# Patient Record
Sex: FEMALE | Race: WHITE | Employment: FULL TIME | URBAN - METROPOLITAN AREA
[De-identification: names, ages, dates, MRNs, and addresses within clinical notes are randomized per-mention and may not be internally consistent; named-entity substitution may affect disease eponyms.]

---

## 2017-02-21 ENCOUNTER — OFFICE VISIT (OUTPATIENT)
Dept: PRIMARY CARE CLINIC | Age: 37
End: 2017-02-21

## 2017-02-21 VITALS
RESPIRATION RATE: 16 BRPM | WEIGHT: 174.4 LBS | HEIGHT: 67 IN | OXYGEN SATURATION: 98 % | TEMPERATURE: 98.1 F | HEART RATE: 73 BPM | DIASTOLIC BLOOD PRESSURE: 73 MMHG | SYSTOLIC BLOOD PRESSURE: 147 MMHG | BODY MASS INDEX: 27.37 KG/M2

## 2017-02-21 DIAGNOSIS — M54.42 ACUTE LEFT-SIDED LOW BACK PAIN WITH LEFT-SIDED SCIATICA: Primary | ICD-10-CM

## 2017-02-21 RX ORDER — IBUPROFEN 800 MG/1
800 TABLET ORAL
Qty: 30 TAB | Refills: 0 | Status: SHIPPED | OUTPATIENT
Start: 2017-02-21 | End: 2017-05-18

## 2017-02-21 RX ORDER — CYCLOBENZAPRINE HCL 10 MG
10 TABLET ORAL
Qty: 30 TAB | Refills: 0 | Status: SHIPPED | OUTPATIENT
Start: 2017-02-21 | End: 2017-02-23 | Stop reason: SDUPTHER

## 2017-02-21 NOTE — MR AVS SNAPSHOT
Visit Information Date & Time Provider Department Dept. Phone Encounter #  
 2/21/2017 11:30 AM Jaycee Prasad NP [address] [facility] 9982 [phone] [id number] Follow-up Instructions Return if symptoms worsen or fail to improve. Upcoming Health Maintenance Date Due Pneumococcal 19-64 Medium Risk (1 of 1 - PPSV23) 10/17/1999 DTaP/Tdap/Td series (1 - Tdap) 10/17/2001 INFLUENZA AGE 9 TO ADULT 8/1/2016 PAP AKA CERVICAL CYTOLOGY 5/1/2018 Allergies as of 2/21/2017  Review Complete On: 2/21/2017 By: Jaycee Prasad NP Severity Noted Reaction Type Reactions Beef Containing Products  10/24/2016    Other (comments) Gi distress Codeine  05/18/2010    Hives Current Immunizations  Reviewed on 10/24/2016 No immunizations on file. Not reviewed this visit You Were Diagnosed With   
  
 Codes Comments Acute left-sided low back pain with left-sided sciatica    -  Primary ICD-10-CM: M54.42 
ICD-9-CM: 724.2, 724.3 Vitals BP Pulse Temp Resp Height(growth percentile) Weight(growth percentile) 147/73 73 98.1 °F (36.7 °C) (Oral) 16 5' 7\" (1.702 m) 174 lb 6.4 oz (79.1 kg) LMP SpO2 BMI OB Status Smoking Status 02/12/2017 (Exact Date) 98% 27.31 kg/m2 Having regular periods Current Every Day Smoker Vitals History BMI and BSA Data Body Mass Index Body Surface Area  
 27.31 kg/m 2 1.93 m 2 Preferred Pharmacy Pharmacy Name Phone [facility], [address] [place] [address] AT [address] [phone] Your Updated Medication List  
  
   
This list is accurate as of: 2/21/17 12:14 PM.  Always use your most recent med list.  
  
  
  
  
 albuterol-ipratropium 2.5 mg-0.5 mg/3 ml Nebu Commonly known as:  DUO-NEB  
3 mL by Nebulization route every four (4) hours as needed. cyclobenzaprine 10 mg tablet Commonly known as:  FLEXERIL  
 Take 1 Tab by mouth three (3) times daily as needed for Muscle Spasm(s). fluticasone 50 mcg/actuation nasal spray Commonly known as:  Nan Finely 2 Sprays by Both Nostrils route daily. ibuprofen 800 mg tablet Commonly known as:  MOTRIN Take 1 Tab by mouth every eight (8) hours as needed for Pain. Prescriptions Sent to Pharmacy Refills  
 ibuprofen (MOTRIN) 800 mg tablet 0 Sig: Take 1 Tab by mouth every eight (8) hours as needed for Pain. Class: Normal  
 Pharmacy: [facility] [address] AT [address] Ph #: [phone] Route: Oral  
 cyclobenzaprine (FLEXERIL) 10 mg tablet 0 Sig: Take 1 Tab by mouth three (3) times daily as needed for Muscle Spasm(s). Class: Normal  
 Pharmacy: [facility] [address] Ph #: [phone] Route: Oral  
  
Follow-up Instructions Return if symptoms worsen or fail to improve. Patient Instructions Back Pain: Care Instructions Your Care Instructions Back pain has many possible causes. It is often related to problems with muscles and ligaments of the back. It may also be related to problems with the nerves, discs, or bones of the back. Moving, lifting, standing, sitting, or sleeping in an awkward way can strain the back. Sometimes you don't notice the injury until later. Arthritis is another common cause of back pain. Although it may hurt a lot, back pain usually improves on its own within several weeks. Most people recover in 12 weeks or less. Using good home treatment and being careful not to stress your back can help you feel better sooner. Follow-up care is a key part of your treatment and safety. Be sure to make and go to all appointments, and call your doctor if you are having problems.  Its also a good idea to know your test results and keep a list of the medicines you take. How can you care for yourself at home? · Sit or lie in positions that are most comfortable and reduce your pain. Try one of these positions when you lie down: ¨ Lie on your back with your knees bent and supported by large pillows. ¨ Lie on the floor with your legs on the seat of a sofa or chair. Toña Miller on your side with your knees and hips bent and a pillow between your legs. ¨ Lie on your stomach if it does not make pain worse. · Do not sit up in bed, and avoid soft couches and twisted positions. Bed rest can help relieve pain at first, but it delays healing. Avoid bed rest after the first day of back pain. · Change positions every 30 minutes. If you must sit for long periods of time, take breaks from sitting. Get up and walk around, or lie in a comfortable position. · Try using a heating pad on a low or medium setting for 15 to 20 minutes every 2 or 3 hours. Try a warm shower in place of one session with the heating pad. · You can also try an ice pack for 10 to 15 minutes every 2 to 3 hours. Put a thin cloth between the ice pack and your skin. · Take pain medicines exactly as directed. ¨ If the doctor gave you a prescription medicine for pain, take it as prescribed. ¨ If you are not taking a prescription pain medicine, ask your doctor if you can take an over-the-counter medicine. · Take short walks several times a day. You can start with 5 to 10 minutes, 3 or 4 times a day, and work up to longer walks. Walk on level surfaces and avoid hills and stairs until your back is better. · Return to work and other activities as soon as you can. Continued rest without activity is usually not good for your back. · To prevent future back pain, do exercises to stretch and strengthen your back and stomach. Learn how to use good posture, safe lifting techniques, and proper body mechanics. When should you call for help? Call your doctor now or seek immediate medical care if: · You have new or worsening numbness in your legs. · You have new or worsening weakness in your legs. (This could make it hard to stand up.) · You lose control of your bladder or bowels. Watch closely for changes in your health, and be sure to contact your doctor if: 
· Your pain gets worse. · You are not getting better after 2 weeks. Where can you learn more? Go to http://shawn-lachelle.info/. Enter Q885 in the search box to learn more about \"Back Pain: Care Instructions. \" Current as of: May 23, 2016 Content Version: 11.1 © 5923-8050 tuul. Care instructions adapted under license by [employer] (which disclaims liability or warranty for this information). If you have questions about a medical condition or this instruction, always ask your healthcare professional. [address] any warranty or liability for your use of this information. Back Stretches: Exercises Your Care Instructions Here are some examples of exercises for stretching your back. Start each exercise slowly. Ease off the exercise if you start to have pain. Your doctor or physical therapist will tell you when you can start these exercises and which ones will work best for you. How to do the exercises Overhead stretch 1. Stand comfortably with your feet shoulder-width apart. 2. Looking straight ahead, raise both arms over your head and reach toward the ceiling. Do not allow your head to tilt back. 3. Hold for 15 to 30 seconds, then lower your arms to your sides. 4. Repeat 2 to 4 times. Side stretch 1. Stand comfortably with your feet shoulder-width apart. 2. Raise one arm over your head, and then lean to the other side. 3. Slide your hand down your leg as you let the weight of your arm gently stretch your side muscles. Hold for 15 to 30 seconds. 4. Repeat 2 to 4 times on each side. Press-up 1. Lie on your stomach, supporting your body with your forearms. 2. Press your elbows down into the floor to raise your upper back. As you do this, relax your stomach muscles and allow your back to arch without using your back muscles. As your press up, do not let your hips or pelvis come off the floor. 3. Hold for 15 to 30 seconds, then relax. 4. Repeat 2 to 4 times. Relax and rest 
 
1. Lie on your back with a rolled towel under your neck and a pillow under your knees. Extend your arms comfortably to your sides. 2. Relax and breathe normally. 3. Remain in this position for about 10 minutes. 4. If you can, do this 2 or 3 times each day. Follow-up care is a key part of your treatment and safety. Be sure to make and go to all appointments, and call your doctor if you are having problems. It's also a good idea to know your test results and keep a list of the medicines you take. Where can you learn more? Go to http://shawn-lachelle.info/. Enter R937 in the search box to learn more about \"Back Stretches: Exercises. \" Current as of: May 23, 2016 Content Version: 11.1 © 8090-6468 [employer]. Care instructions adapted under license by [employer] (which disclaims liability or warranty for this information). If you have questions about a medical condition or this instruction, always ask your healthcare professional. [place] [address] any warranty or liability for your use of this information. Introducing [facility]! Brenda Reyes introduces [employer] patient portal. Now you can access parts of your medical record, email your doctor's office, and request medication refills online. 1. In your internet browser, go to [email or web address] 2. Click on the First Time User? Click Here link in the Sign In box. You will see the New Member Sign Up page. 3. Enter your [employer] Access Code exactly as it appears below.  You will not need to use this code after youve completed the sign-up process. If you do not sign up before the expiration date, you must request a new code. · [employer] Access Code: [email or web address] Expires: 5/22/2017 12:08 PM 
 
4. Enter the last four digits of your Social Security Number (xxxx) and Date of Birth (mm/dd/yyyy) as indicated and click Submit. You will be taken to the next sign-up page. 5. Create a [employer] ID. This will be your [employer] login ID and cannot be changed, so think of one that is secure and easy to remember. 6. Create a [employer] password. You can change your password at any time. 7. Enter your Password Reset Question and Answer. This can be used at a later time if you forget your password. 8. Enter your e-mail address. You will receive e-mail notification when new information is available in [address]. 9. Click Sign Up. You can now view and download portions of your medical record. 10. Click the Download Summary menu link to download a portable copy of your medical information. If you have questions, please visit the Frequently Asked Questions section of the [employer] website. Remember, [employer] is NOT to be used for urgent needs. For medical emergencies, dial 911. Now available from your iPhone and Android! Please provide this summary of care documentation to your next provider. Your primary care clinician is listed as Tyler Valerio. If you have any questions after today's visit, please call [phone].

## 2017-02-21 NOTE — PATIENT INSTRUCTIONS
Back Pain: Care Instructions  Your Care Instructions    Back pain has many possible causes. It is often related to problems with muscles and ligaments of the back. It may also be related to problems with the nerves, discs, or bones of the back. Moving, lifting, standing, sitting, or sleeping in an awkward way can strain the back. Sometimes you don't notice the injury until later. Arthritis is another common cause of back pain. Although it may hurt a lot, back pain usually improves on its own within several weeks. Most people recover in 12 weeks or less. Using good home treatment and being careful not to stress your back can help you feel better sooner. Follow-up care is a key part of your treatment and safety. Be sure to make and go to all appointments, and call your doctor if you are having problems. Its also a good idea to know your test results and keep a list of the medicines you take. How can you care for yourself at home? · Sit or lie in positions that are most comfortable and reduce your pain. Try one of these positions when you lie down:  ¨ Lie on your back with your knees bent and supported by large pillows. ¨ Lie on the floor with your legs on the seat of a sofa or chair. Tildon Floss on your side with your knees and hips bent and a pillow between your legs. ¨ Lie on your stomach if it does not make pain worse. · Do not sit up in bed, and avoid soft couches and twisted positions. Bed rest can help relieve pain at first, but it delays healing. Avoid bed rest after the first day of back pain. · Change positions every 30 minutes. If you must sit for long periods of time, take breaks from sitting. Get up and walk around, or lie in a comfortable position. · Try using a heating pad on a low or medium setting for 15 to 20 minutes every 2 or 3 hours. Try a warm shower in place of one session with the heating pad. · You can also try an ice pack for 10 to 15 minutes every 2 to 3 hours.  Put a thin cloth between the ice pack and your skin. · Take pain medicines exactly as directed. ¨ If the doctor gave you a prescription medicine for pain, take it as prescribed. ¨ If you are not taking a prescription pain medicine, ask your doctor if you can take an over-the-counter medicine. · Take short walks several times a day. You can start with 5 to 10 minutes, 3 or 4 times a day, and work up to longer walks. Walk on level surfaces and avoid hills and stairs until your back is better. · Return to work and other activities as soon as you can. Continued rest without activity is usually not good for your back. · To prevent future back pain, do exercises to stretch and strengthen your back and stomach. Learn how to use good posture, safe lifting techniques, and proper body mechanics. When should you call for help? Call your doctor now or seek immediate medical care if:  · You have new or worsening numbness in your legs. · You have new or worsening weakness in your legs. (This could make it hard to stand up.)  · You lose control of your bladder or bowels. Watch closely for changes in your health, and be sure to contact your doctor if:  · Your pain gets worse. · You are not getting better after 2 weeks. Where can you learn more? Go to http://shawn-lachelle.info/. Enter U590 in the search box to learn more about \"Back Pain: Care Instructions. \"  Current as of: May 23, 2016  Content Version: 11.1  © 8171-8266 [employer]. Care instructions adapted under license by [employer] (which disclaims liability or warranty for this information). If you have questions about a medical condition or this instruction, always ask your healthcare professional. [address] any warranty or liability for your use of this information. Back Stretches: Exercises  Your Care Instructions  Here are some examples of exercises for stretching your back. Start each exercise slowly. Ease off the exercise if you start to have pain. Your doctor or physical therapist will tell you when you can start these exercises and which ones will work best for you. How to do the exercises  Overhead stretch    1. Stand comfortably with your feet shoulder-width apart. 2. Looking straight ahead, raise both arms over your head and reach toward the ceiling. Do not allow your head to tilt back. 3. Hold for 15 to 30 seconds, then lower your arms to your sides. 4. Repeat 2 to 4 times. Side stretch    1. Stand comfortably with your feet shoulder-width apart. 2. Raise one arm over your head, and then lean to the other side. 3. Slide your hand down your leg as you let the weight of your arm gently stretch your side muscles. Hold for 15 to 30 seconds. 4. Repeat 2 to 4 times on each side. Press-up    1. Lie on your stomach, supporting your body with your forearms. 2. Press your elbows down into the floor to raise your upper back. As you do this, relax your stomach muscles and allow your back to arch without using your back muscles. As your press up, do not let your hips or pelvis come off the floor. 3. Hold for 15 to 30 seconds, then relax. 4. Repeat 2 to 4 times. Relax and rest    1. Lie on your back with a rolled towel under your neck and a pillow under your knees. Extend your arms comfortably to your sides. 2. Relax and breathe normally. 3. Remain in this position for about 10 minutes. 4. If you can, do this 2 or 3 times each day. Follow-up care is a key part of your treatment and safety. Be sure to make and go to all appointments, and call your doctor if you are having problems. It's also a good idea to know your test results and keep a list of the medicines you take. Where can you learn more? Go to http://shawn-lachelle.info/. Enter K552 in the search box to learn more about \"Back Stretches: Exercises. \"  Current as of: May 23, 2016  Content Version: 11.1  © 8575-9118 [employer]wise, Incorporated. Care instructions adapted under license by [employer] (which disclaims liability or warranty for this information). If you have questions about a medical condition or this instruction, always ask your healthcare professional. [address] any warranty or liability for your use of this information.

## 2017-02-21 NOTE — PROGRESS NOTES
Subjective: Ilene Alfonso is a 39 y.o. female who complains of mostly left low back pain for 1 week, positional with bending or lifting, with radiation down the left leg. Precipitating factors: awkward seat position on new bus, no other specific event that she can recall. Prior history of back problems: recurrent self limited episodes of low back pain in the past. There is not numbness in the legs. She is accompanied by her  today. Symptoms are worst: all the time. Alleviating factors identifiable by patient are none. Exacerbating factors identifiable by patient are sitting, walking, bending forwards, changing positions. Taking Tylenol prn pain with little to no relief. Past Medical History   Diagnosis Date    Other ill-defined conditions(799.89)      ovarian cyst     Psychiatric disorder      suicide attempt, depression      Past Surgical History   Procedure Laterality Date    Hx appendectomy       1999    Hx gyn       BTL    Hx gyn       tubes tied 10/2008     Allergies   Allergen Reactions    Beef Containing Products Other (comments)     Gi distress     Codeine Hives       Review of Systems  Pertinent items are noted in HPI. Objective:     Visit Vitals    /73    Pulse 73    Temp 98.1 °F (36.7 °C) (Oral)    Resp 16    Ht 5' 7\" (1.702 m)    Wt 174 lb 6.4 oz (79.1 kg)    LMP 02/12/2017 (Exact Date)    SpO2 98%    BMI 27.31 kg/m2      Patient appears to be in mild to moderate pain, antalgic gait noted. Lumbosacral spine area reveals no local tenderness or mass. Painful and reduced LS ROM noted. Straight leg raise is negative at 75 degrees on bilateral. DTR's, motor strength and sensation normal, including heel and toe gait. Peripheral pulses are palpable. Assessment/Plan:       ICD-10-CM ICD-9-CM    1.  Acute left-sided low back pain with left-sided sciatica M54.42 724.2      724.3      Orders Placed This Encounter    ibuprofen (MOTRIN) 800 mg tablet     Sig: Take 1 Tab by mouth every eight (8) hours as needed for Pain. Dispense:  30 Tab     Refill:  0    cyclobenzaprine (FLEXERIL) 10 mg tablet     Sig: Take 1 Tab by mouth three (3) times daily as needed for Muscle Spasm(s). Dispense:  30 Tab     Refill:  0       Work note given. For acute pain, rest, intermittent application of heat (do not sleep on heating pad), analgesics (Ibuprofen 800mg q8h prn - she is codeine allergic) and muscle relaxants are recommended. Discussed longer term treatment plan of prn NSAID's and discussed a home back care exercise program with flexion exercise routine. Proper lifting with avoidance of heavy lifting discussed. Consider Physical Therapy and XRay studies if not improving. Call or return to clinic prn if these symptoms worsen or fail to improve as anticipated.       Randal Quevedo, NP

## 2017-02-21 NOTE — LETTER
NOTIFICATION RETURN TO WORK / SCHOOL 
 
2/21/2017 12:17 PM 
 
Ms. Fox Lin [address] [place] [facility] [address] To Whom It May Concern: Fox Lin is currently under the care of [facility]. She will return to work/school on 2/24/2017. If there are questions or concerns please have the patient contact our office. Sincerely, Sandra Swan NP

## 2017-02-23 ENCOUNTER — OFFICE VISIT (OUTPATIENT)
Dept: PRIMARY CARE CLINIC | Age: 37
End: 2017-02-23

## 2017-02-23 VITALS
DIASTOLIC BLOOD PRESSURE: 80 MMHG | TEMPERATURE: 98.4 F | HEIGHT: 67 IN | HEART RATE: 87 BPM | OXYGEN SATURATION: 98 % | BODY MASS INDEX: 27.47 KG/M2 | RESPIRATION RATE: 16 BRPM | SYSTOLIC BLOOD PRESSURE: 125 MMHG | WEIGHT: 175 LBS

## 2017-02-23 DIAGNOSIS — M54.16 LUMBAR RADICULOPATHY: Primary | ICD-10-CM

## 2017-02-23 RX ORDER — CYCLOBENZAPRINE HCL 10 MG
10 TABLET ORAL
Qty: 30 TAB | Refills: 0 | Status: SHIPPED | OUTPATIENT
Start: 2017-02-23 | End: 2017-05-18

## 2017-02-23 RX ORDER — METHYLPREDNISOLONE 4 MG/1
4 TABLET ORAL
Qty: 1 DOSE PACK | Refills: 0 | Status: SHIPPED | OUTPATIENT
Start: 2017-02-23 | End: 2017-03-01

## 2017-02-23 NOTE — LETTER
NOTIFICATION RETURN TO WORK / SCHOOL 
 
2/23/2017 3:35 PM 
 
Ms. Brian Elliott [address] [place] [address] To Whom It May Concern: Brian Elliott is currently under the care of [facility]. She will return to work/school on: 2-27-17 If there are questions or concerns please have the patient contact our office.  
 
 
 
Sincerely, 
 
 
Kenzie Wisdom NP

## 2017-02-23 NOTE — MR AVS SNAPSHOT
Visit Information Date & Time Provider Department Dept. Phone Encounter #  
 2/23/2017  3:00 PM Judith Connell NP [address] [address] [phone] [id number] Follow-up Instructions Return if symptoms worsen or fail to improve. Upcoming Health Maintenance Date Due Pneumococcal 19-64 Medium Risk (1 of 1 - PPSV23) 10/17/1999 DTaP/Tdap/Td series (1 - Tdap) 10/17/2001 INFLUENZA AGE 9 TO ADULT 8/1/2016 PAP AKA CERVICAL CYTOLOGY 5/1/2018 Allergies as of 2/23/2017  Review Complete On: 2/23/2017 By: Judith Connell NP Severity Noted Reaction Type Reactions Beef Containing Products  10/24/2016    Other (comments) Gi distress Codeine  05/18/2010    Hives Current Immunizations  Reviewed on 10/24/2016 No immunizations on file. Not reviewed this visit You Were Diagnosed With   
  
 Codes Comments Lumbar radiculopathy    -  Primary ICD-10-CM: M54.16 
ICD-9-CM: 724.4 Vitals BP  
  
  
  
  
  
 125/80 Vitals History BMI and BSA Data Body Mass Index Body Surface Area  
 27.41 kg/m 2 1.94 m 2 Preferred Pharmacy Pharmacy Name Phone [address] [place] [address], [address] [place] [address] Darlin Arreaga Said [phone] Your Updated Medication List  
  
   
This list is accurate as of: 2/23/17  3:34 PM.  Always use your most recent med list.  
  
  
  
  
 cyclobenzaprine 10 mg tablet Commonly known as:  FLEXERIL Take 1 Tab by mouth three (3) times daily as needed for Muscle Spasm(s). ibuprofen 800 mg tablet Commonly known as:  MOTRIN Take 1 Tab by mouth every eight (8) hours as needed for Pain. methylPREDNISolone 4 mg tablet Commonly known as:  Evy Blakes Take 1 Tab by mouth Specific Days and Specific Times for 6 days. 4 mg tabs orally; 6 tabs today then 5 tab/day #2, 4 tab/day #3, 3 tab/day #4, 2 tab/day #5, 1 tab/day # 6 then discontinue. Prescriptions Printed Refills  
 methylPREDNISolone (MEDROL DOSEPACK) 4 mg tablet 0 Sig: Take 1 Tab by mouth Specific Days and Specific Times for 6 days. 4 mg tabs orally; 6 tabs today then 5 tab/day #2, 4 tab/day #3, 3 tab/day #4, 2 tab/day #5, 1 tab/day # 6 then discontinue. Class: Print Route: Oral  
  
Prescriptions Sent to Pharmacy Refills  
 cyclobenzaprine (FLEXERIL) 10 mg tablet 0 Sig: Take 1 Tab by mouth three (3) times daily as needed for Muscle Spasm(s). Class: Normal  
 Pharmacy: Kendra Calderon  at [address] #: [phone] Route: Oral  
  
We Performed the Following REFERRAL TO ORTHOPEDICS [JNQ186 Custom] Comments:  
 Please evaluate patient for Lumbar pain with radiculopathy REFERRAL TO PRIMARY CARE [KHW004 CPT(R)] Comments:  
 Evaluate for PCP Care Follow-up Instructions Return if symptoms worsen or fail to improve. Referral Information Referral ID Referred By Referred To  
  
 [id number] DONNA, [address] [place] [address], [address] [place] [address]. [address] Phone: [phone] Fax: [phone] Visits Status Start Date End Date 1 New Request 2/23/17 2/23/18 If your referral has a status of pending review or denied, additional information will be sent to support the outcome of this decision. Referral ID Referred By Referred To  
 [id number] Ryan Lui MD  
   [facility], [address] [place] [address] Phone: [phone] Fax: [phone] Visits Status Start Date End Date 1 New Request 2/23/17 2/23/18 If your referral has a status of pending review or denied, additional information will be sent to support the outcome of this decision. Patient Instructions Back Stretches: Exercises Your Care Instructions Here are some examples of exercises for stretching your back. Start each exercise slowly. Ease off the exercise if you start to have pain. Your doctor or physical therapist will tell you when you can start these exercises and which ones will work best for you. How to do the exercises Overhead stretch 1. Stand comfortably with your feet shoulder-width apart. 2. Looking straight ahead, raise both arms over your head and reach toward the ceiling. Do not allow your head to tilt back. 3. Hold for 15 to 30 seconds, then lower your arms to your sides. 4. Repeat 2 to 4 times. Side stretch 1. Stand comfortably with your feet shoulder-width apart. 2. Raise one arm over your head, and then lean to the other side. 3. Slide your hand down your leg as you let the weight of your arm gently stretch your side muscles. Hold for 15 to 30 seconds. 4. Repeat 2 to 4 times on each side. Press-up 1. Lie on your stomach, supporting your body with your forearms. 2. Press your elbows down into the floor to raise your upper back. As you do this, relax your stomach muscles and allow your back to arch without using your back muscles. As your press up, do not let your hips or pelvis come off the floor. 3. Hold for 15 to 30 seconds, then relax. 4. Repeat 2 to 4 times. Relax and rest 
 
1. Lie on your back with a rolled towel under your neck and a pillow under your knees. Extend your arms comfortably to your sides. 2. Relax and breathe normally. 3. Remain in this position for about 10 minutes. 4. If you can, do this 2 or 3 times each day. Follow-up care is a key part of your treatment and safety. Be sure to make and go to all appointments, and call your doctor if you are having problems. It's also a good idea to know your test results and keep a list of the medicines you take. Where can you learn more? Go to http://shawn-lachelle.info/. Enter U659 in the search box to learn more about \"Back Stretches: Exercises. \" Current as of: May 23, 2016 Content Version: 11.1 © 2006-2016 [employer]. Care instructions adapted under license by [employer] (which disclaims liability or warranty for this information). If you have questions about a medical condition or this instruction, always ask your healthcare professional. [address] any warranty or liability for your use of this information. Back Pain: Care Instructions Your Care Instructions Back pain has many possible causes. It is often related to problems with muscles and ligaments of the back. It may also be related to problems with the nerves, discs, or bones of the back. Moving, lifting, standing, sitting, or sleeping in an awkward way can strain the back. Sometimes you don't notice the injury until later. Arthritis is another common cause of back pain. Although it may hurt a lot, back pain usually improves on its own within several weeks. Most people recover in 12 weeks or less. Using good home treatment and being careful not to stress your back can help you feel better sooner. Follow-up care is a key part of your treatment and safety. Be sure to make and go to all appointments, and call your doctor if you are having problems. Its also a good idea to know your test results and keep a list of the medicines you take. How can you care for yourself at home? · Sit or lie in positions that are most comfortable and reduce your pain. Try one of these positions when you lie down: ¨ Lie on your back with your knees bent and supported by large pillows. ¨ Lie on the floor with your legs on the seat of a sofa or chair. Rachid Ports on your side with your knees and hips bent and a pillow between your legs. ¨ Lie on your stomach if it does not make pain worse. · Do not sit up in bed, and avoid soft couches and twisted positions.  Bed rest can help relieve pain at first, but it delays healing. Avoid bed rest after the first day of back pain. · Change positions every 30 minutes. If you must sit for long periods of time, take breaks from sitting. Get up and walk around, or lie in a comfortable position. · Try using a heating pad on a low or medium setting for 15 to 20 minutes every 2 or 3 hours. Try a warm shower in place of one session with the heating pad. · You can also try an ice pack for 10 to 15 minutes every 2 to 3 hours. Put a thin cloth between the ice pack and your skin. · Take pain medicines exactly as directed. ¨ If the doctor gave you a prescription medicine for pain, take it as prescribed. ¨ If you are not taking a prescription pain medicine, ask your doctor if you can take an over-the-counter medicine. · Take short walks several times a day. You can start with 5 to 10 minutes, 3 or 4 times a day, and work up to longer walks. Walk on level surfaces and avoid hills and stairs until your back is better. · Return to work and other activities as soon as you can. Continued rest without activity is usually not good for your back. · To prevent future back pain, do exercises to stretch and strengthen your back and stomach. Learn how to use good posture, safe lifting techniques, and proper body mechanics. When should you call for help? Call your doctor now or seek immediate medical care if: 
· You have new or worsening numbness in your legs. · You have new or worsening weakness in your legs. (This could make it hard to stand up.) · You lose control of your bladder or bowels. Watch closely for changes in your health, and be sure to contact your doctor if: 
· Your pain gets worse. · You are not getting better after 2 weeks. Where can you learn more? Go to http://shawn-lachelle.info/. Enter Q470 in the search box to learn more about \"Back Pain: Care Instructions. \" Current as of: May 23, 2016 Content Version: 11.1 © 7731-4255 [employer]. Care instructions adapted under license by [employer] (which disclaims liability or warranty for this information). If you have questions about a medical condition or this instruction, always ask your healthcare professional. [address] any warranty or liability for your use of this information. Introducing [facility]! Jorge Bishop introduces [employer] patient portal. Now you can access parts of your medical record, email your doctor's office, and request medication refills online. 1. In your internet browser, go to [email or web address] 2. Click on the First Time User? Click Here link in the Sign In box. You will see the New Member Sign Up page. 3. Enter your [employer] Access Code exactly as it appears below. You will not need to use this code after youve completed the sign-up process. If you do not sign up before the expiration date, you must request a new code. · [employer] Access Code: [email or web address] Expires: 5/22/2017 12:08 PM 
 
4. Enter the last four digits of your Social Security Number (xxxx) and Date of Birth (mm/dd/yyyy) as indicated and click Submit. You will be taken to the next sign-up page. 5. Create a [employer] ID. This will be your [employer] login ID and cannot be changed, so think of one that is secure and easy to remember. 6. Create a [employer] password. You can change your password at any time. 7. Enter your Password Reset Question and Answer. This can be used at a later time if you forget your password. 8. Enter your e-mail address. You will receive e-mail notification when new information is available in [address] 9. Click Sign Up. You can now view and download portions of your medical record. 10. Click the Download Summary menu link to download a portable copy of your medical information. If you have questions, please visit the Frequently Asked Questions section of the [employer] website. Remember, [employer] is NOT to be used for urgent needs. For medical emergencies, dial 911. Now available from your iPhone and Android! Please provide this summary of care documentation to your next provider. If you have any questions after today's visit, please call [phone].

## 2017-02-23 NOTE — PROGRESS NOTES
Chief Complaint   Patient presents with    Back Pain     pt c/o continued back pain that is now shooting down her L leg, was seen on 2/21/17, took 1 motrin 800 mg this morning

## 2017-02-23 NOTE — PATIENT INSTRUCTIONS
Back Stretches: Exercises  Your Care Instructions  Here are some examples of exercises for stretching your back. Start each exercise slowly. Ease off the exercise if you start to have pain. Your doctor or physical therapist will tell you when you can start these exercises and which ones will work best for you. How to do the exercises  Overhead stretch    1. Stand comfortably with your feet shoulder-width apart. 2. Looking straight ahead, raise both arms over your head and reach toward the ceiling. Do not allow your head to tilt back. 3. Hold for 15 to 30 seconds, then lower your arms to your sides. 4. Repeat 2 to 4 times. Side stretch    1. Stand comfortably with your feet shoulder-width apart. 2. Raise one arm over your head, and then lean to the other side. 3. Slide your hand down your leg as you let the weight of your arm gently stretch your side muscles. Hold for 15 to 30 seconds. 4. Repeat 2 to 4 times on each side. Press-up    1. Lie on your stomach, supporting your body with your forearms. 2. Press your elbows down into the floor to raise your upper back. As you do this, relax your stomach muscles and allow your back to arch without using your back muscles. As your press up, do not let your hips or pelvis come off the floor. 3. Hold for 15 to 30 seconds, then relax. 4. Repeat 2 to 4 times. Relax and rest    1. Lie on your back with a rolled towel under your neck and a pillow under your knees. Extend your arms comfortably to your sides. 2. Relax and breathe normally. 3. Remain in this position for about 10 minutes. 4. If you can, do this 2 or 3 times each day. Follow-up care is a key part of your treatment and safety. Be sure to make and go to all appointments, and call your doctor if you are having problems. It's also a good idea to know your test results and keep a list of the medicines you take. Where can you learn more? Go to http://shawn-lachelle.info/.   Enter M334 in the search box to learn more about \"Back Stretches: Exercises. \"  Current as of: May 23, 2016  Content Version: 11.1  © 2006-2016 [employer]. Care instructions adapted under license by "[employer]" (which disclaims liability or warranty for this information). If you have questions about a medical condition or this instruction, always ask your healthcare professional. [address]  any warranty or liability for your use of this information. Back Pain: Care Instructions  Your Care Instructions    Back pain has many possible causes. It is often related to problems with muscles and ligaments of the back. It may also be related to problems with the nerves, discs, or bones of the back. Moving, lifting, standing, sitting, or sleeping in an awkward way can strain the back. Sometimes you don't notice the injury until later. Arthritis is another common cause of back pain. Although it may hurt a lot, back pain usually improves on its own within several weeks. Most people recover in 12 weeks or less. Using good home treatment and being careful not to stress your back can help you feel better sooner. Follow-up care is a key part of your treatment and safety. Be sure to make and go to all appointments, and call your doctor if you are having problems. Its also a good idea to know your test results and keep a list of the medicines you take. How can you care for yourself at home? · Sit or lie in positions that are most comfortable and reduce your pain. Try one of these positions when you lie down:  ¨ Lie on your back with your knees bent and supported by large pillows. ¨ Lie on the floor with your legs on the seat of a sofa or chair. Toña Miller on your side with your knees and hips bent and a pillow between your legs. ¨ Lie on your stomach if it does not make pain worse. · Do not sit up in bed, and avoid soft couches and twisted positions.  Bed rest can help relieve pain at first, but it delays healing. Avoid bed rest after the first day of back pain. · Change positions every 30 minutes. If you must sit for long periods of time, take breaks from sitting. Get up and walk around, or lie in a comfortable position. · Try using a heating pad on a low or medium setting for 15 to 20 minutes every 2 or 3 hours. Try a warm shower in place of one session with the heating pad. · You can also try an ice pack for 10 to 15 minutes every 2 to 3 hours. Put a thin cloth between the ice pack and your skin. · Take pain medicines exactly as directed. ¨ If the doctor gave you a prescription medicine for pain, take it as prescribed. ¨ If you are not taking a prescription pain medicine, ask your doctor if you can take an over-the-counter medicine. · Take short walks several times a day. You can start with 5 to 10 minutes, 3 or 4 times a day, and work up to longer walks. Walk on level surfaces and avoid hills and stairs until your back is better. · Return to work and other activities as soon as you can. Continued rest without activity is usually not good for your back. · To prevent future back pain, do exercises to stretch and strengthen your back and stomach. Learn how to use good posture, safe lifting techniques, and proper body mechanics. When should you call for help? Call your doctor now or seek immediate medical care if:  · You have new or worsening numbness in your legs. · You have new or worsening weakness in your legs. (This could make it hard to stand up.)  · You lose control of your bladder or bowels. Watch closely for changes in your health, and be sure to contact your doctor if:  · Your pain gets worse. · You are not getting better after 2 weeks. Where can you learn more? Go to http://shawn-lachelle.info/. Enter T971 in the search box to learn more about \"Back Pain: Care Instructions. \"  Current as of: May 23, 2016  Content Version: 11.1  © 6267-0932 [employer]. Care instructions adapted under license by [employer] (which disclaims liability or warranty for this information). If you have questions about a medical condition or this instruction, always ask your healthcare professional. [address] any warranty or liability for your use of this information.

## 2017-02-26 NOTE — PROGRESS NOTES
Subjective: Mathew Bo is a 39 y.o. female who complains of low back pain for 3 weeks, positional with bending or lifting, with radiation down the legs. Precipitating factors: none recalled by the patient. Prior history of back problems: recurrent self limited episodes of low back pain in the past. There is no numbness in the legs. She has occasional shooting pain down her legs, no numbness, weakness or tingling. The pain is relieved with rest.  Symptoms are worst: morning. Alleviating factors identifiable by patient are recumbency. Exacerbating factors identifiable by patient are sitting. Patient Active Problem List   Diagnosis Code    Dysthymia F34.1    Dyspepsia R10.13     Patient Active Problem List    Diagnosis Date Noted    Dysthymia 09/15/2010    Dyspepsia 09/15/2010     Current Outpatient Prescriptions   Medication Sig Dispense Refill    methylPREDNISolone (MEDROL DOSEPACK) 4 mg tablet Take 1 Tab by mouth Specific Days and Specific Times for 6 days. 4 mg tabs orally; 6 tabs today then 5 tab/day #2, 4 tab/day #3, 3 tab/day #4, 2 tab/day #5, 1 tab/day # 6 then discontinue. 1 Dose Pack 0    cyclobenzaprine (FLEXERIL) 10 mg tablet Take 1 Tab by mouth three (3) times daily as needed for Muscle Spasm(s). 30 Tab 0    ibuprofen (MOTRIN) 800 mg tablet Take 1 Tab by mouth every eight (8) hours as needed for Pain.  30 Tab 0     Allergies   Allergen Reactions    Beef Containing Products Other (comments)     Gi distress     Codeine Hives     Past Medical History:   Diagnosis Date    Other ill-defined conditions(159.55)     ovarian cyst     Psychiatric disorder     suicide attempt, depression      Past Surgical History:   Procedure Laterality Date    HX APPENDECTOMY      1999    HX GYN      BTL    HX GYN      tubes tied 10/2008     Family History   Problem Relation Age of Onset    Heart Attack Mother     Hypertension Father     No Known Problems Sister     Cancer Maternal Aunt     No Known Problems Maternal Uncle     No Known Problems Paternal Aunt     Cancer Paternal Uncle      testicular cancer    Cancer Maternal Grandmother      lung/leukemia    Diabetes Maternal Grandfather     No Known Problems Paternal Grandmother     Stroke Paternal Grandfather     Asthma Son      Social History   Substance Use Topics    Smoking status: Current Every Day Smoker     Packs/day: 1.00     Years: 15.00     Types: Cigarettes    Smokeless tobacco: Never Used    Alcohol use 0.0 oz/week     0 Standard drinks or equivalent per week      Comment: occasional-weekends        Review of Systems  A comprehensive review of systems was negative except for that written in the HPI. Objective:     Visit Vitals    /80    Pulse 87    Temp 98.4 °F (36.9 °C) (Oral)    Resp 16    Ht 5' 7\" (1.702 m)    Wt 175 lb (79.4 kg)    LMP 02/12/2017 (Exact Date)    SpO2 98%    BMI 27.41 kg/m2      Patient appears to be in mild to moderate pain, antalgic gait noted. Lumbosacral spine area reveals no local tenderness or mass. Painful and reduced LS ROM noted. DTR's, motor strength and sensation normal, including heel and toe gait. Peripheral pulses are palpable. X-Ray: not indicated. Assessment/Plan:     degenerative disc disease without herniated disc and with radiculopathy    For acute pain, rest, intermittent application of heat (do not sleep on heating pad), analgesics and muscle relaxants are recommended. Discussed longer term treatment plan of prn NSAID's and discussed a home back care exercise program with flexion exercise routine. Proper lifting with avoidance of heavy lifting discussed. Consider Physical Therapy and XRay studies if not improving. Call or return to clinic prn if these symptoms worsen or fail to improve as anticipated. Ibuprofen 600 mg TID with food as needed for pain.     ICD-10-CM ICD-9-CM    1. Lumbar radiculopathy M54.16 724.4 methylPREDNISolone (MEDROL DOSEPACK) 4 mg tablet cyclobenzaprine (FLEXERIL) 10 mg tablet      REFERRAL TO ORTHOPEDICS      REFERRAL TO PRIMARY CARE

## 2017-05-18 ENCOUNTER — HOSPITAL ENCOUNTER (OUTPATIENT)
Dept: LAB | Age: 37
Discharge: HOME | End: 2017-05-18

## 2017-05-18 ENCOUNTER — HOSPITAL ENCOUNTER (EMERGENCY)
Age: 37
Discharge: HOME | End: 2017-05-18
Attending: FAMILY MEDICINE

## 2017-05-18 VITALS
OXYGEN SATURATION: 95 % | TEMPERATURE: 98.3 F | DIASTOLIC BLOOD PRESSURE: 91 MMHG | WEIGHT: 171.2 LBS | RESPIRATION RATE: 18 BRPM | HEIGHT: 67 IN | HEART RATE: 77 BPM | SYSTOLIC BLOOD PRESSURE: 132 MMHG | BODY MASS INDEX: 26.87 KG/M2

## 2017-05-18 DIAGNOSIS — J20.9 ACUTE BRONCHITIS, UNSPECIFIED ORGANISM: Primary | ICD-10-CM

## 2017-05-18 LAB — S PYO AG THROAT QL: NEGATIVE

## 2017-05-18 PROCEDURE — 87070 CULTURE OTHR SPECIMN AEROBIC: CPT | Performed by: FAMILY MEDICINE

## 2017-05-18 RX ORDER — AZITHROMYCIN 250 MG/1
TABLET, FILM COATED ORAL
Qty: 6 TAB | Refills: 0 | Status: SHIPPED | OUTPATIENT
Start: 2017-05-18 | End: 2017-08-02 | Stop reason: ALTCHOICE

## 2017-05-18 RX ORDER — BENZONATATE 200 MG/1
200 CAPSULE ORAL
Qty: 21 CAP | Refills: 0 | Status: SHIPPED | OUTPATIENT
Start: 2017-05-18 | End: 2017-05-25

## 2017-05-18 NOTE — UC PROVIDER NOTE
Patient is a 39 y.o. female presenting with sore throat and cough. The history is provided by the patient. Sore Throat    This is a new problem. The current episode started more than 2 days ago. The problem has been gradually worsening. There has been no fever. Associated symptoms include congestion and cough. Pertinent negatives include no headaches and no plugged ear sensation. Cough   This is a new problem. The current episode started more than 2 days ago. The problem occurs every few minutes. The problem has not changed since onset. The cough is productive of sputum. Associated symptoms include sore throat. Pertinent negatives include no chills, no headaches and no rhinorrhea. She has tried nothing for the symptoms. She is not a smoker. Her past medical history is significant for bronchitis. Her past medical history does not include asthma. Past Medical History:   Diagnosis Date    Other ill-defined conditions(729.02)     ovarian cyst     Psychiatric disorder     suicide attempt, depression         Past Surgical History:   Procedure Laterality Date    HX APPENDECTOMY      1999    HX GYN      BTL    HX GYN      tubes tied 10/2008         Family History   Problem Relation Age of Onset    Heart Attack Mother     Hypertension Father     No Known Problems Sister     Cancer Maternal Aunt     No Known Problems Maternal Uncle     No Known Problems Paternal Aunt     Cancer Paternal Uncle      testicular cancer    Cancer Maternal Grandmother      lung/leukemia    Diabetes Maternal Grandfather     No Known Problems Paternal Grandmother     Stroke Paternal Grandfather     Asthma Son         Social History     Social History    Marital status:      Spouse name: N/A    Number of children: N/A    Years of education: N/A     Occupational History    Not on file.      Social History Main Topics    Smoking status: Current Every Day Smoker     Packs/day: 1.00     Years: 15.00     Types: Cigarettes    Smokeless tobacco: Never Used    Alcohol use 0.0 oz/week     0 Standard drinks or equivalent per week      Comment: occasional-weekends    Drug use: No    Sexual activity: Yes     Birth control/ protection: Surgical      Comment:       Other Topics Concern    Not on file     Social History Narrative                ALLERGIES: Beef containing products and Codeine    Review of Systems   Constitutional: Negative for chills. HENT: Positive for congestion and sore throat. Negative for rhinorrhea. Respiratory: Positive for cough. Neurological: Negative for headaches. Vitals:    05/18/17 1212   BP: (!) 132/91   Pulse: 77   Resp: 18   Temp: 98.3 °F (36.8 °C)   SpO2: 95%   Weight: 77.7 kg (171 lb 3.2 oz)   Height: 5' 7\" (1.702 m)       Physical Exam   Constitutional: No distress. HENT:   Right Ear: Tympanic membrane and ear canal normal.   Left Ear: Tympanic membrane and ear canal normal.   Nose: Nose normal.   Mouth/Throat: No oropharyngeal exudate, posterior oropharyngeal edema or posterior oropharyngeal erythema. Eyes: Conjunctivae are normal. Right eye exhibits no discharge. Left eye exhibits no discharge. Neck: Neck supple. Pulmonary/Chest: Effort normal. No respiratory distress. She has decreased breath sounds. She has no wheezes. She has rhonchi. She has no rales. Lymphadenopathy:     She has no cervical adenopathy. Skin: No rash noted. Nursing note and vitals reviewed. MDM     Differential Diagnosis; Clinical Impression; Plan:     CLINICAL IMPRESSION:  Acute bronchitis, unspecified organism  (primary encounter diagnosis)      DDX    Plan:    Z rasheed, tessalon and Mucinex D with claritin  Fluids. Amount and/or Complexity of Data Reviewed:    Review and summarize past medical records:  Yes  Risk of Significant Complications, Morbidity, and/or Mortality:   Presenting problems: Moderate  Management options:   Moderate  Progress:   Patient progress: Stable      Procedures

## 2017-05-18 NOTE — DISCHARGE INSTRUCTIONS
Bronchitis: Care Instructions  Your Care Instructions    Bronchitis is inflammation of the bronchial tubes, which carry air to the lungs. The tubes swell and produce mucus, or phlegm. The mucus and inflamed bronchial tubes make you cough. You may have trouble breathing. Most cases of bronchitis are caused by viruses like those that cause colds. Antibiotics usually do not help and they may be harmful. Bronchitis usually develops rapidly and lasts about 2 to 3 weeks in otherwise healthy people. Follow-up care is a key part of your treatment and safety. Be sure to make and go to all appointments, and call your doctor if you are having problems. It's also a good idea to know your test results and keep a list of the medicines you take. How can you care for yourself at home? · Take all medicines exactly as prescribed. Call your doctor if you think you are having a problem with your medicine. · Get some extra rest.  · Take an over-the-counter pain medicine, such as acetaminophen (Tylenol), ibuprofen (Advil, Motrin), or naproxen (Aleve) to reduce fever and relieve body aches. Read and follow all instructions on the label. · Do not take two or more pain medicines at the same time unless the doctor told you to. Many pain medicines have acetaminophen, which is Tylenol. Too much acetaminophen (Tylenol) can be harmful. · Take an over-the-counter cough medicine that contains dextromethorphan to help quiet a dry, hacking cough so that you can sleep. Avoid cough medicines that have more than one active ingredient. Read and follow all instructions on the label. · Breathe moist air from a humidifier, hot shower, or sink filled with hot water. The heat and moisture will thin mucus so you can cough it out. · Do not smoke. Smoking can make bronchitis worse. If you need help quitting, talk to your doctor about stop-smoking programs and medicines. These can increase your chances of quitting for good.   When should you call for help? Call 911 anytime you think you may need emergency care. For example, call if:  · You have severe trouble breathing. Call your doctor now or seek immediate medical care if:  · You have new or worse trouble breathing. · You cough up dark brown or bloody mucus (sputum). · You have a new or higher fever. · You have a new rash. Watch closely for changes in your health, and be sure to contact your doctor if:  · You cough more deeply or more often, especially if you notice more mucus or a change in the color of your mucus. · You are not getting better as expected. Where can you learn more? Go to http://shawn-lachelle.info/. Enter H333 in the search box to learn more about \"Bronchitis: Care Instructions. \"  Current as of: May 23, 2016  Content Version: 11.2  © 7726-2683 [employer]. Care instructions adapted under license by [employer] (which disclaims liability or warranty for this information). If you have questions about a medical condition or this instruction, always ask your healthcare professional. [address] any warranty or liability for your use of this information.

## 2017-05-20 LAB
BACTERIA SPEC CULT: NORMAL
SERVICE CMNT-IMP: NORMAL

## 2017-08-02 ENCOUNTER — OFFICE VISIT (OUTPATIENT)
Dept: PRIMARY CARE CLINIC | Age: 37
End: 2017-08-02

## 2017-08-02 VITALS
DIASTOLIC BLOOD PRESSURE: 76 MMHG | RESPIRATION RATE: 16 BRPM | HEART RATE: 73 BPM | SYSTOLIC BLOOD PRESSURE: 113 MMHG | HEIGHT: 67 IN | WEIGHT: 165.8 LBS | BODY MASS INDEX: 26.02 KG/M2

## 2017-08-02 DIAGNOSIS — R81 GLUCOSURIA: Primary | ICD-10-CM

## 2017-08-02 NOTE — PROGRESS NOTES
Chief Complaint   Patient presents with    Blood sugar problem       HPI:  39year old female who is asymptomatic who had her annual  physical and was noted on the dipstick UA to test positive at 100mg/dl for Glucose, moderate blood (but on menses now), negative for protein. Notably, she takes Cranberry pills and these DO discolor her urine. She is sent here because she has no regular PCP and requires labs to \"rule out diabetes\". Will send     Review of Systems - no recent weight loss/gain, fevers, chills, chest pain, shortness of breath, cough, nausea, vomiting, diarrhea, urinary frequency/urgency/dysuria. Otherwise, ROS negative except as per HPI    Past Medical History:   Diagnosis Date    Other ill-defined conditions     ovarian cyst     Psychiatric disorder     suicide attempt, depression        Past Surgical History:   Procedure Laterality Date    HX APPENDECTOMY      1999    HX GYN      BTL    HX GYN      tubes tied 10/2008       Family History   Problem Relation Age of Onset    Heart Attack Mother     Hypertension Father     No Known Problems Sister     Cancer Maternal Aunt     No Known Problems Maternal Uncle     No Known Problems Paternal Aunt     Cancer Paternal Uncle      testicular cancer    Cancer Maternal Grandmother      lung/leukemia    Diabetes Maternal Grandfather     No Known Problems Paternal Grandmother     Stroke Paternal Grandfather     Asthma Son        Social History     Social History    Marital status:      Spouse name: N/A    Number of children: N/A    Years of education: N/A     Occupational History    Not on file.      Social History Main Topics    Smoking status: Current Every Day Smoker     Packs/day: 1.00     Years: 15.00     Types: Cigarettes    Smokeless tobacco: Never Used    Alcohol use 0.0 oz/week     0 Standard drinks or equivalent per week      Comment: occasional-weekends    Drug use: No    Sexual activity: Yes     Birth control/ protection: Surgical      Comment:       Other Topics Concern    Not on file     Social History Narrative       No current outpatient prescriptions on file prior to visit. No current facility-administered medications on file prior to visit. Allergies   Allergen Reactions    Beef Containing Products Other (comments)     Gi distress     Codeine Hives       PE:    General:  Well-developed, well-nourished female in no apparent distress  HEENT:  Normocephalic, atraumatic, Pupils are equal, round, & reactive to light & accommodation. Extraocular movements intact. TM's normal, external auditory exam normal.  Oropharynx grossly normal.  No tonsillar enlargement, erythema, or exudates. Neck:  Supple, nontender, full ROM. No lymphadenopathy. No thyromegaly. Chest:  clear to auscultation without rales, rhonchi, or wheezes. CV:  Regular rate & rhythm without murmurs, gallops, clicks, or rubs. Abdomen:  soft, nontender, nondistended, normoactive bowel sounds, no organomegaly. Extremities:  No edema, clubbing, or cyanosis. Full ROM, nontender. Orders Placed This Encounter    CBC WITH AUTOMATED DIFF    METABOLIC PANEL, COMPREHENSIVE    TSH 3RD GENERATION    HEMOGLOBIN A1C WITH EAG       1.  Glucosuria    - CBC WITH AUTOMATED DIFF  - METABOLIC PANEL, COMPREHENSIVE  - TSH 3RD GENERATION  - HEMOGLOBIN A1C WITH EAG    Follow-up Disposition: Not on File    Ronal Munson MD

## 2017-08-02 NOTE — MR AVS SNAPSHOT
Visit Information Date & Time Provider Department Dept. Phone Encounter #  
 8/2/2017  1:15 PM Marixasusanna Riley, [facility] [id number] [id number] Follow-up Instructions Return if symptoms worsen or fail to improve. Upcoming Health Maintenance Date Due Pneumococcal 19-64 Medium Risk (1 of 1 - PPSV23) 10/17/1999 DTaP/Tdap/Td series (1 - Tdap) 10/17/2001 INFLUENZA AGE 9 TO ADULT 8/1/2017 PAP AKA CERVICAL CYTOLOGY 5/1/2018 Allergies as of 8/2/2017  Review Complete On: 8/2/2017 By: Marixa Riley MD  
  
 Severity Noted Reaction Type Reactions Beef Containing Products  10/24/2016    Other (comments) Gi distress Codeine  05/18/2010    Hives Current Immunizations  Reviewed on 10/24/2016 No immunizations on file. Not reviewed this visit You Were Diagnosed With   
  
 Codes Comments Glucosuria    -  Primary ICD-10-CM: R81 
ICD-9-CM: 791.5 Vitals BP Pulse Resp Height(growth percentile) Weight(growth percentile) LMP  
 113/76 73 16 5' 7\" (1.702 m) 165 lb 12.8 oz (75.2 kg) 08/01/2017 BMI OB Status Smoking Status 25.97 kg/m2 Having regular periods Current Every Day Smoker BMI and BSA Data Body Mass Index Body Surface Area  
 25.97 kg/m 2 1.89 m 2 Preferred Pharmacy Pharmacy Name Phone [facility] [address] AT [address] [place] [address] Drive [phone] Your Updated Medication List  
  
Notice  As of 8/2/2017  1:18 PM  
 You have not been prescribed any medications. We Performed the Following CBC WITH AUTOMATED DIFF [22038 CPT(R)] HEMOGLOBIN A1C WITH EAG [93054 CPT(R)] METABOLIC PANEL, COMPREHENSIVE [20069 CPT(R)] TSH 3RD GENERATION [78599 CPT(R)] Follow-up Instructions Return if symptoms worsen or fail to improve. Introducing [facility]! Hermelinda Kinsey introduces [employer] patient portal. Now you can access parts of your medical record, email your doctor's office, and request medication refills online. 1. In your internet browser, go to [email or web address] 2. Click on the First Time User? Click Here link in the Sign In box. You will see the New Member Sign Up page. 3. Enter your HeadMix Access Code exactly as it appears below. You will not need to use this code after youve completed the sign-up process. If you do not sign up before the expiration date, you must request a new code. · [employer]Mix Access Code: [email or web address] Expires: 10/31/2017  1:14 PM 
 
4. Enter the last four digits of your Social Security Number (xxxx) and Date of Birth (mm/dd/yyyy) as indicated and click Submit. You will be taken to the next sign-up page. 5. Create a HeadMix ID. This will be your HeadMix login ID and cannot be changed, so think of one that is secure and easy to remember. 6. Create a HeadMix password. You can change your password at any time. 7. Enter your Password Reset Question and Answer. This can be used at a later time if you forget your password. 8. Enter your e-mail address. You will receive e-mail notification when new information is available in [address] 9. Click Sign Up. You can now view and download portions of your medical record. 10. Click the Download Summary menu link to download a portable copy of your medical information. If you have questions, please visit the Frequently Asked Questions section of the [employer] website. Remember, HeadMix is NOT to be used for urgent needs. For medical emergencies, dial 911. Now available from your iPhone and Android! Please provide this summary of care documentation to your next provider. If you have any questions after today's visit, please call [phone].

## 2017-08-03 LAB
ALBUMIN SERPL-MCNC: 4.6 G/DL (ref 3.5–5.5)
ALBUMIN/GLOB SERPL: 1.8 {RATIO} (ref 1.2–2.2)
ALP SERPL-CCNC: 63 IU/L (ref 39–117)
ALT SERPL-CCNC: 18 IU/L (ref 0–32)
AST SERPL-CCNC: 14 IU/L (ref 0–40)
BASOPHILS # BLD AUTO: 0.1 X10E3/UL (ref 0–0.2)
BASOPHILS NFR BLD AUTO: 1 %
BILIRUB SERPL-MCNC: 0.5 MG/DL (ref 0–1.2)
BUN SERPL-MCNC: 10 MG/DL (ref 6–20)
BUN/CREAT SERPL: 12 (ref 9–23)
CALCIUM SERPL-MCNC: 9.1 MG/DL (ref 8.7–10.2)
CHLORIDE SERPL-SCNC: 100 MMOL/L (ref 96–106)
CO2 SERPL-SCNC: 20 MMOL/L (ref 18–29)
CREAT SERPL-MCNC: 0.81 MG/DL (ref 0.57–1)
EOSINOPHIL # BLD AUTO: 0.3 X10E3/UL (ref 0–0.4)
EOSINOPHIL NFR BLD AUTO: 3 %
ERYTHROCYTE [DISTWIDTH] IN BLOOD BY AUTOMATED COUNT: 14.4 % (ref 12.3–15.4)
EST. AVERAGE GLUCOSE BLD GHB EST-MCNC: 103 MG/DL
GLOBULIN SER CALC-MCNC: 2.5 G/DL (ref 1.5–4.5)
GLUCOSE SERPL-MCNC: 80 MG/DL (ref 65–99)
HBA1C MFR BLD: 5.2 % (ref 4.8–5.6)
HCT VFR BLD AUTO: 48.1 % (ref 34–46.6)
HGB BLD-MCNC: 16 G/DL (ref 11.1–15.9)
IMM GRANULOCYTES # BLD: 0 X10E3/UL (ref 0–0.1)
IMM GRANULOCYTES NFR BLD: 0 %
LYMPHOCYTES # BLD AUTO: 2.7 X10E3/UL (ref 0.7–3.1)
LYMPHOCYTES NFR BLD AUTO: 27 %
MCH RBC QN AUTO: 31.7 PG (ref 26.6–33)
MCHC RBC AUTO-ENTMCNC: 33.3 G/DL (ref 31.5–35.7)
MCV RBC AUTO: 95 FL (ref 79–97)
MONOCYTES # BLD AUTO: 0.5 X10E3/UL (ref 0.1–0.9)
MONOCYTES NFR BLD AUTO: 5 %
NEUTROPHILS # BLD AUTO: 6.3 X10E3/UL (ref 1.4–7)
NEUTROPHILS NFR BLD AUTO: 64 %
PLATELET # BLD AUTO: 266 X10E3/UL (ref 150–379)
POTASSIUM SERPL-SCNC: 4.4 MMOL/L (ref 3.5–5.2)
PROT SERPL-MCNC: 7.1 G/DL (ref 6–8.5)
RBC # BLD AUTO: 5.05 X10E6/UL (ref 3.77–5.28)
SODIUM SERPL-SCNC: 137 MMOL/L (ref 134–144)
TSH SERPL DL<=0.005 MIU/L-ACNC: 0.81 UIU/ML (ref 0.45–4.5)
WBC # BLD AUTO: 9.8 X10E3/UL (ref 3.4–10.8)

## 2017-08-04 NOTE — PROGRESS NOTES
Called patient to inform her about test results. 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 She was told she needs to follow up for further testing & evaluation. She may see either her new PCP or an endocrinologist.  Patient voiced understanding of these instructions.

## 2017-08-11 ENCOUNTER — CLINICAL SUPPORT (OUTPATIENT)
Dept: PRIMARY CARE CLINIC | Age: 37
End: 2017-08-11

## 2017-08-11 DIAGNOSIS — Z23 ENCOUNTER FOR IMMUNIZATION: Primary | ICD-10-CM

## 2017-08-11 NOTE — PROGRESS NOTES
Chief Complaint   Patient presents with    PPD Placement     Here for placement of PPD for work     PPD Placement note  Hilda Mathew, 39 y.o. female is here today for placement of PPD test  Reason for PPD test: work  Pt taken PPD test before: yes  Verified in allergy area and with patient that they are not allergic to the products PPD is made of (Phenol or Tween). Yes  Is patient taking any oral or IV steroid medication now or have they taken it in the last month? no  O: Alert and oriented in NAD. P:  PPD placed on 8/11/2017. Patient advised to return for reading within 48-72 hours.

## 2017-11-02 ENCOUNTER — HOSPITAL ENCOUNTER (EMERGENCY)
Age: 37
Discharge: HOME | End: 2017-11-02
Attending: FAMILY MEDICINE

## 2017-11-02 VITALS
RESPIRATION RATE: 18 BRPM | SYSTOLIC BLOOD PRESSURE: 135 MMHG | WEIGHT: 161.9 LBS | TEMPERATURE: 98.4 F | DIASTOLIC BLOOD PRESSURE: 77 MMHG | BODY MASS INDEX: 25.41 KG/M2 | HEART RATE: 84 BPM | OXYGEN SATURATION: 97 % | HEIGHT: 67 IN

## 2017-11-02 DIAGNOSIS — R05.9 COUGH: Primary | ICD-10-CM

## 2017-11-02 RX ORDER — BENZONATATE 100 MG/1
100 CAPSULE ORAL
Qty: 30 CAP | Refills: 0 | Status: SHIPPED | OUTPATIENT
Start: 2017-11-02 | End: 2017-11-09

## 2017-11-02 RX ORDER — AZITHROMYCIN 250 MG/1
TABLET, FILM COATED ORAL
Qty: 6 TAB | Refills: 0 | Status: SHIPPED | OUTPATIENT
Start: 2017-11-02 | End: 2018-03-19

## 2017-11-02 NOTE — LETTER
[facility] 
[address] [address] 
[phone] Work/School Note Date: 11/2/2017 To Whom It May concern: Verma Severs was seen and treated today in the emergency room by the following provider(s): 
Attending Provider: Светлана Melgar MD 
Physician Assistant: Kash Brady. Verma Severs may return to work on 11/6/17. Sincerely, Kash Brady

## 2017-11-02 NOTE — DISCHARGE INSTRUCTIONS

## 2017-11-05 NOTE — UC PROVIDER NOTE
Patient is a 40 y.o. female presenting with cough. The history is provided by the patient. Cough   This is a new problem. The current episode started more than 1 week ago. The problem occurs every few minutes. The problem has not changed since onset. The cough is non-productive. There has been no fever. Pertinent negatives include no chest pain, no chills, no sore throat and no wheezing. She has tried nothing for the symptoms. She is not a smoker. Past Medical History:   Diagnosis Date    Other ill-defined conditions(780.90)     ovarian cyst     Psychiatric disorder     suicide attempt, depression         Past Surgical History:   Procedure Laterality Date    HX APPENDECTOMY      1999    HX GYN      BTL    HX GYN      tubes tied 10/2008         Family History   Problem Relation Age of Onset    Heart Attack Mother     Hypertension Father     No Known Problems Sister     Cancer Maternal Aunt     No Known Problems Maternal Uncle     No Known Problems Paternal Aunt     Cancer Paternal Uncle      testicular cancer    Cancer Maternal Grandmother      lung/leukemia    Diabetes Maternal Grandfather     No Known Problems Paternal Grandmother     Stroke Paternal Grandfather     Asthma Son         Social History     Social History    Marital status:      Spouse name: N/A    Number of children: N/A    Years of education: N/A     Occupational History    Not on file.      Social History Main Topics    Smoking status: Current Every Day Smoker     Packs/day: 1.00     Years: 15.00     Types: Cigarettes    Smokeless tobacco: Never Used    Alcohol use 0.0 oz/week     0 Standard drinks or equivalent per week      Comment: occasional-weekends    Drug use: No    Sexual activity: Yes     Birth control/ protection: Surgical      Comment:       Other Topics Concern    Not on file     Social History Narrative                ALLERGIES: Beef containing products and Codeine    Review of Systems Constitutional: Negative for chills. HENT: Negative for sore throat. Respiratory: Positive for cough. Negative for wheezing. Cardiovascular: Negative for chest pain. Vitals:    11/02/17 1225   BP: 135/77   Pulse: 84   Resp: 18   Temp: 98.4 °F (36.9 °C)   SpO2: 97%   Weight: 73.4 kg (161 lb 14.4 oz)   Height: 5' 7\" (1.702 m)       Physical Exam   Constitutional: She is oriented to person, place, and time. She appears well-developed and well-nourished. HENT:   Right Ear: External ear normal.   Left Ear: External ear normal.   Eyes: Conjunctivae and EOM are normal.   Cardiovascular: Normal rate, regular rhythm and normal heart sounds. Pulmonary/Chest: Effort normal and breath sounds normal. No respiratory distress. She has no wheezes. She has no rales. Neurological: She is alert and oriented to person, place, and time. Skin: Skin is warm and dry. Psychiatric: She has a normal mood and affect. Her behavior is normal. Judgment and thought content normal.   Nursing note and vitals reviewed. MDM     Differential Diagnosis; Clinical Impression; Plan:     CLINICAL IMPRESSION:  Cough  (primary encounter diagnosis)    Plan:  1. Zithromax  2. Tessalon   3. Risk of Significant Complications, Morbidity, and/or Mortality:   Presenting problems: Moderate  Diagnostic procedures: Moderate  Management options:   Moderate  Progress:   Patient progress:  Stable      Procedures

## 2018-03-19 ENCOUNTER — OFFICE VISIT (OUTPATIENT)
Dept: URGENT CARE | Age: 38
End: 2018-03-19

## 2018-03-19 VITALS
BODY MASS INDEX: 26.68 KG/M2 | DIASTOLIC BLOOD PRESSURE: 59 MMHG | TEMPERATURE: 96.9 F | SYSTOLIC BLOOD PRESSURE: 147 MMHG | WEIGHT: 170 LBS | HEART RATE: 80 BPM | RESPIRATION RATE: 18 BRPM | OXYGEN SATURATION: 98 % | HEIGHT: 67 IN

## 2018-03-19 DIAGNOSIS — J06.9 URI, ACUTE: Primary | ICD-10-CM

## 2018-03-19 RX ORDER — BENZONATATE 200 MG/1
200 CAPSULE ORAL
Qty: 21 CAP | Refills: 0 | Status: SHIPPED | OUTPATIENT
Start: 2018-03-19 | End: 2018-03-26

## 2018-03-19 NOTE — PROGRESS NOTES
Patient is a 40 y.o. female presenting with cold symptoms. The history is provided by the patient. Cold Symptoms   The history is provided by the patient. This is a new problem. The problem occurs every few minutes. The cough is non-productive. There has been no fever. Associated symptoms include headaches, rhinorrhea and sore throat. Pertinent negatives include no chills, no eye redness, no ear pain and no wheezing. Past Medical History:   Diagnosis Date    Other ill-defined conditions(899.89)     ovarian cyst     Psychiatric disorder     suicide attempt, depression         Past Surgical History:   Procedure Laterality Date    HX APPENDECTOMY      1999    HX GYN      BTL    HX GYN      tubes tied 10/2008         Family History   Problem Relation Age of Onset    Heart Attack Mother     Hypertension Father     No Known Problems Sister     Cancer Maternal Aunt     No Known Problems Maternal Uncle     No Known Problems Paternal Aunt     Cancer Paternal Uncle      testicular cancer    Cancer Maternal Grandmother      lung/leukemia    Diabetes Maternal Grandfather     No Known Problems Paternal Grandmother     Stroke Paternal Grandfather     Asthma Son         Social History     Social History    Marital status:      Spouse name: N/A    Number of children: N/A    Years of education: N/A     Occupational History    Not on file.      Social History Main Topics    Smoking status: Current Every Day Smoker     Packs/day: 1.00     Years: 15.00     Types: Cigarettes    Smokeless tobacco: Never Used    Alcohol use 0.0 oz/week     0 Standard drinks or equivalent per week      Comment: occasional-weekends    Drug use: No    Sexual activity: Yes     Birth control/ protection: Surgical      Comment:       Other Topics Concern    Not on file     Social History Narrative                ALLERGIES: Beef containing products and Codeine    Review of Systems   Constitutional: Positive for activity change. Negative for chills and fever. HENT: Positive for congestion, rhinorrhea, sinus pain, sinus pressure and sore throat. Negative for ear pain, facial swelling and postnasal drip. Eyes: Negative for redness. Respiratory: Positive for cough. Negative for wheezing. Neurological: Positive for headaches. Vitals:    03/19/18 1052   BP: 147/59   Pulse: 80   Resp: 18   Temp: 96.9 °F (36.1 °C)   SpO2: 98%   Weight: 170 lb (77.1 kg)   Height: 5' 7\" (1.702 m)       Physical Exam   Constitutional: She is oriented to person, place, and time. She appears well-developed and well-nourished. HENT:   Head: Normocephalic and atraumatic. Right Ear: External ear normal.   Left Ear: External ear normal.   Mouth/Throat: Oropharynx is clear and moist.   Eyes: Conjunctivae and EOM are normal.   Neck: Normal range of motion. Neck supple. No JVD present. No tracheal deviation present. No thyromegaly present. Cardiovascular: Normal rate, regular rhythm and normal heart sounds. Pulmonary/Chest: Effort normal and breath sounds normal. No respiratory distress. She has no wheezes. She has no rales. She exhibits no tenderness. Lymphadenopathy:     She has no cervical adenopathy. Neurological: She is alert and oriented to person, place, and time. Skin: Skin is warm and dry. Psychiatric: She has a normal mood and affect. Her behavior is normal. Judgment and thought content normal.   Nursing note and vitals reviewed. MDM    Procedures                         ICD-10-CM ICD-9-CM    1. URI, acute J06.9 465.9      Medications Ordered Today   Medications    benzonatate (TESSALON) 200 mg capsule     Sig: Take 1 Cap by mouth three (3) times daily as needed for Cough for up to 7 days. Dispense:  21 Cap     Refill:  0     The patients condition was discussed with the patient and they understand.   The patient is to follow up with primary care doctor ,If signs and symptoms become worse the pt is to go to the ER. The patient is to take medications as prescribed.

## 2018-03-19 NOTE — MR AVS SNAPSHOT
[address] [place] [address] 
[phone] Patient: Jerzy Cui MRN: [id number] :1980 Visit Information Date & Time Provider Department Dept. Phone Encounter #  
 3/19/2018 10:30 AM [facility] [phone] [id number] Upcoming Health Maintenance Date Due Pneumococcal 19-64 Medium Risk (1 of 1 - PPSV23) 10/17/1999 DTaP/Tdap/Td series (1 - Tdap) 10/17/2001 Influenza Age 5 to Adult 2017 PAP AKA CERVICAL CYTOLOGY 2018 Allergies as of 3/19/2018  Review Complete On: 3/19/2018 By: Nicolas Taylor RN Severity Noted Reaction Type Reactions Beef Containing Products  10/24/2016    Other (comments) Gi distress Codeine  2010    Hives Current Immunizations  Reviewed on 10/24/2016 Name Date  
 TB Skin Test (PPD) Intradermal 2017  1:50 PM  
  
 Not reviewed this visit You Were Diagnosed With   
  
 Codes Comments URI, acute    -  Primary ICD-10-CM: J06.9 ICD-9-CM: 465.9 Vitals BP Pulse Temp Resp Height(growth percentile) Weight(growth percentile) 147/59 80 96.9 °F (36.1 °C) 18 5' 7\" (1.702 m) 170 lb (77.1 kg) LMP SpO2 BMI OB Status Smoking Status 2018 98% 26.63 kg/m2 Having regular periods Current Every Day Smoker BMI and BSA Data Body Mass Index Body Surface Area  
 26.63 kg/m 2 1.91 m 2 Preferred Pharmacy Pharmacy Name Phone 6[address] [phone] Your Updated Medication List  
  
   
This list is accurate as of 3/19/18 11:19 AM.  Always use your most recent med list.  
  
  
  
  
 benzonatate 200 mg capsule Commonly known as:  TESSALON Take 1 Cap by mouth three (3) times daily as needed for Cough for up to 7 days. Prescriptions Sent to Pharmacy  Refills  
 benzonatate (TESSALON) 200 mg capsule 0  
 Sig: Take 1 Cap by mouth three (3) times daily as needed for Cough for up to 7 days. Class: Normal  
 Pharmacy: [facility] DR KEKE STEINBERG [address] [place] [address] #: [phone] Route: Oral  
  
Introducing [facility] HE[facility]! Pilarmaureen Simeon introduces Paperlit patient portal. Now you can access parts of your medical record, email your doctor's office, and request medication refills online. 1. In your internet browser, go to [email or web address] 2. Click on the First Time User? Click Here link in the Sign In box. You will see the New Member Sign Up page. 3. Enter your Paperlit Access Code exactly as it appears below. You will not need to use this code after youve completed the sign-up process. If you do not sign up before the expiration date, you must request a new code. · Paperlit Access Code: [email or web address] Expires: 6/17/2018 10:43 AM 
 
4. Enter the last four digits of your Social Security Number (xxxx) and Date of Birth (mm/dd/yyyy) as indicated and click Submit. You will be taken to the next sign-up page. 5. Create a Paperlit ID. This will be your Paperlit login ID and cannot be changed, so think of one that is secure and easy to remember. 6. Create a Paperlit password. You can change your password at any time. 7. Enter your Password Reset Question and Answer. This can be used at a later time if you forget your password. 8. Enter your e-mail address. You will receive e-mail notification when new information is available in [address] 9. Click Sign Up. You can now view and download portions of your medical record. 10. Click the Download Summary menu link to download a portable copy of your medical information. If you have questions, please visit the Frequently Asked Questions section of the Paperlit website. Remember, Paperlit is NOT to be used for urgent needs. For medical emergencies, dial 911. Now available from your iPhone and Android! Please provide this summary of care documentation to your next provider. Your primary care clinician is listed as NONE. If you have any questions after today's visit, please call [phone].

## 2018-03-19 NOTE — LETTER
[address] [place] [facility] [address] 
[phone] Work/School Note Date: 3/19/2018 To Whom It May concern: Francine Martinez was seen and treated today in the emergency room by the following provider(s): 
No providers found. Francine Martinez may return to work on 03/21/18. Sincerely, Nery Watkins

## 2018-06-24 ENCOUNTER — OFFICE VISIT (OUTPATIENT)
Dept: URGENT CARE | Age: 38
End: 2018-06-24

## 2018-06-24 VITALS
RESPIRATION RATE: 18 BRPM | DIASTOLIC BLOOD PRESSURE: 86 MMHG | OXYGEN SATURATION: 98 % | WEIGHT: 167 LBS | HEART RATE: 100 BPM | HEIGHT: 67 IN | TEMPERATURE: 98.5 F | BODY MASS INDEX: 26.21 KG/M2 | SYSTOLIC BLOOD PRESSURE: 114 MMHG

## 2018-06-24 DIAGNOSIS — R10.32 LEFT LOWER QUADRANT PAIN: Primary | ICD-10-CM

## 2018-06-24 RX ORDER — METRONIDAZOLE 250 MG/1
250 TABLET ORAL 3 TIMES DAILY
Qty: 21 TAB | Refills: 0 | Status: SHIPPED | OUTPATIENT
Start: 2018-06-24 | End: 2018-07-01

## 2018-06-24 RX ORDER — CIPROFLOXACIN 500 MG/1
500 TABLET ORAL 2 TIMES DAILY
Qty: 14 TAB | Refills: 0 | Status: SHIPPED | OUTPATIENT
Start: 2018-06-24 | End: 2019-02-24

## 2018-06-24 RX ORDER — DICYCLOMINE HYDROCHLORIDE 20 MG/1
20 TABLET ORAL EVERY 6 HOURS
Qty: 12 TAB | Refills: 0 | Status: SHIPPED | OUTPATIENT
Start: 2018-06-24 | End: 2019-02-24

## 2018-06-24 RX ORDER — MAG HYDROX/ALUMINUM HYD/SIMETH 200-200-20
30 SUSPENSION, ORAL (FINAL DOSE FORM) ORAL ONCE
Qty: 30 ML | Refills: 0 | Status: SHIPPED | COMMUNITY
Start: 2018-06-24 | End: 2018-06-24

## 2018-06-24 RX ORDER — ONDANSETRON 4 MG/1
4 TABLET, ORALLY DISINTEGRATING ORAL
Qty: 10 TAB | Refills: 0 | Status: SHIPPED | OUTPATIENT
Start: 2018-06-24 | End: 2019-02-24

## 2018-06-24 NOTE — LETTER
[address] [place] [address] [place] [address] 
[phone] Work/School Note Date: 6/24/2018 To Whom It May concern: Huyen Tomlinson was seen and treated today in the urgent care center by the following by Dr Mercedes Lowry. She is referred to see Gastroenterologist- reference number is [id number].   
 
 
Sincerely, 
 
 
 
 
Dr Mercedes Lowry

## 2018-06-24 NOTE — MR AVS SNAPSHOT
[address] [facility] [address] 
[phone] Patient: Rochelle Boyle MRN: [id number] :1980 Visit Information Date & Time Provider Department Dept. Phone Encounter #  
 2018  1:30 PM [facility] [phone] [id number] Upcoming Health Maintenance Date Due Pneumococcal 19-64 Medium Risk (1 of 1 - PPSV23) 10/17/1999 DTaP/Tdap/Td series (1 - Tdap) 10/17/2001 PAP AKA CERVICAL CYTOLOGY 2018 Influenza Age 5 to Adult 2018 Allergies as of 2018  Review Complete On: 2018 By: Maryellen Magaña Severity Noted Reaction Type Reactions Beef Containing Products  10/24/2016    Other (comments) Gi distress Codeine  2010    Hives Current Immunizations  Reviewed on 10/24/2016 Name Date  
 TB Skin Test (PPD) Intradermal 2017  1:50 PM  
  
 Not reviewed this visit You Were Diagnosed With   
  
 Codes Comments Left lower quadrant pain    -  Primary ICD-10-CM: R10.32 
ICD-9-CM: 789.04 ? diverticulitis Vitals BP Pulse Temp Resp Height(growth percentile) Weight(growth percentile) 114/86 100 98.5 °F (36.9 °C) 18 5' 7\" (1.702 m) 167 lb (75.8 kg) LMP SpO2 BMI OB Status Smoking Status 2018 98% 26.16 kg/m2 Having regular periods Current Every Day Smoker BMI and BSA Data Body Mass Index Body Surface Area  
 26.16 kg/m 2 1.89 m 2 Preferred Pharmacy Pharmacy Name Phone [address] [phone] Your Updated Medication List  
  
   
This list is accurate as of 18  2:30 PM.  Always use your most recent med list.  
  
  
  
  
 alum-mag hydroxide-simeth 200-200-20 mg/5 mL Susp Commonly known as:  MYLANTA Take 30 mL by mouth once for 1 dose. ciprofloxacin HCl 500 mg tablet Commonly known as:  CIPRO Take 1 Tab by mouth two (2) times a day. dicyclomine 20 mg tablet Commonly known as:  BENTYL Take 1 Tab by mouth every six (6) hours. metroNIDAZOLE 250 mg tablet Commonly known as:  FLAGYL Take 1 Tab by mouth three (3) times daily for 7 days. ondansetron 4 mg disintegrating tablet Commonly known as:  ZOFRAN ODT Take 1 Tab by mouth every eight (8) hours as needed for Nausea. Prescriptions Sent to Pharmacy Refills  
 ondansetron (ZOFRAN ODT) 4 mg disintegrating tablet 0 Sig: Take 1 Tab by mouth every eight (8) hours as needed for Nausea. Class: Normal  
 Pharmacy: [facility] DR KEKE STEINBERG [address] [place] [address] Ph #: [phone] Route: Oral  
 dicyclomine (BENTYL) 20 mg tablet 0 Sig: Take 1 Tab by mouth every six (6) hours. Class: Normal  
 Pharmacy: [facility] DR KEKE RAJPUT [address] Ph #: [phone] Route: Oral  
 metroNIDAZOLE (FLAGYL) 250 mg tablet 0 Sig: Take 1 Tab by mouth three (3) times daily for 7 days. Class: Normal  
 Pharmacy: [facility] DR KEKE RAJPUT [address] Ph #: [phone] Route: Oral  
 ciprofloxacin HCl (CIPRO) 500 mg tablet 0 Sig: Take 1 Tab by mouth two (2) times a day. Class: Normal  
 Pharmacy: [facility] DR KEKE RAJPUT [address] Ph #: [phone] Route: Oral  
  
To-Do List   
 06/24/2018 Imaging:  XR ABD (AP AND ERECT OR DECUB) Patient Instructions Clear liquid diet x 2-3 days Advance gradually If sxs worsen/not better go to ED Learning About Diverticulosis and Diverticulitis What are diverticulosis and diverticulitis? In diverticulosis and diverticulitis, pouches called diverticula form in the wall of the large intestine, or colon. · In diverticulosis, the pouches do not cause any pain or other symptoms. · In diverticulitis, the pouches get inflamed or infected and cause symptoms. Doctors aren't sure what causes these pouches in the colon.  But they think that a low-fiber diet may play a role. Without fiber to add bulk to the stool, the colon has to work harder than normal to push the stool forward. The pressure from this may cause pouches to form in weak spots along the colon. Some people with diverticulosis get diverticulitis. But experts don't know why this happens. What are the symptoms? · In diverticulosis, most people don't have symptoms. But pouches sometimes bleed. · In diverticulitis, symptoms may last from a few hours to a week or more. They include: ¨ Belly pain. This is usually in the lower left side. It is sometimes worse when you move. This is the most common symptom. ¨ Fever and chills. ¨ Bloating and gas. ¨ Diarrhea or constipation. ¨ Nausea and sometimes vomiting. ¨ Not feeling like eating. How can you prevent these problems? You may be able to lower your chance of getting diverticulitis. You can do this by taking steps to prevent constipation. · Eat fruits, vegetables, beans, and whole grains every day. These foods are high in fiber. · Drink plenty of fluids (enough so that your urine is light yellow or clear like water). If you have kidney, heart, or liver disease and have to limit fluids, talk with your doctor before you increase the amount of fluids you drink. · Get at least 30 minutes of exercise on most days of the week. Walking is a good choice. You also may want to do other activities, such as running, swimming, cycling, or playing tennis or team sports. · Take a fiber supplement, such as Citrucel or Metamucil, every day if needed. Read and follow all instructions on the label. · Schedule time each day for a bowel movement. Having a daily routine may help. Take your time and do not strain when having a bowel movement. Some people avoid nuts, seeds, berries, and popcorn. They believe that these foods might get trapped in the diverticula and cause pain.  But there is no proof that these foods cause diverticulitis or make it worse. How are these problems treated? · The best way to treat diverticulosis is to avoid constipation. (See the tips above.) · Treatment for diverticulitis includes antibiotics and often a change in your diet. You may need only liquids at first. Your doctor may suggest pain medicines for pain or belly cramps. In some cases, surgery may be needed. Follow-up care is a key part of your treatment and safety. Be sure to make and go to all appointments, and call your doctor if you are having problems. It's also a good idea to know your test results and keep a list of the medicines you take. Where can you learn more? Go to http://shawn-lachelle.info/. Enter I367 in the search box to learn more about \"Learning About Diverticulosis and Diverticulitis. \" Current as of: May 12, 2017 Content Version: 11.4 © 6858-9762 Sellfy. Care instructions adapted under license by Puzl (which disclaims liability or warranty for this information). If you have questions about a medical condition or this instruction, always ask your healthcare professional. [place] [address] any warranty or liability for your use of this information. Introducing [facility] & HEALTH SERVICES! Maria G Whiting introduces Sleep.FM patient portal. Now you can access parts of your medical record, email your doctor's office, and request medication refills online. 1. In your internet browser, go to [email or web address] 2. Click on the First Time User? Click Here link in the Sign In box. You will see the New Member Sign Up page. 3. Enter your Sleep.FM Access Code exactly as it appears below. You will not need to use this code after youve completed the sign-up process. If you do not sign up before the expiration date, you must request a new code.  
 
· Sleep.FM Access Code: [email or web address] 
 Expires: 9/22/2018  1:22 PM 
 
4. Enter the last four digits of your Social Security Number (xxxx) and Date of Birth (mm/dd/yyyy) as indicated and click Submit. You will be taken to the next sign-up page. 5. Create a TuneUp ID. This will be your TuneUp login ID and cannot be changed, so think of one that is secure and easy to remember. 6. Create a TuneUp password. You can change your password at any time. 7. Enter your Password Reset Question and Answer. This can be used at a later time if you forget your password. 8. Enter your e-mail address. You will receive e-mail notification when new information is available in [address] 9. Click Sign Up. You can now view and download portions of your medical record. 10. Click the Download Summary menu link to download a portable copy of your medical information. If you have questions, please visit the Frequently Asked Questions section of the TuneUp website. Remember, TuneUp is NOT to be used for urgent needs. For medical emergencies, dial 911. Now available from your iPhone and Android! Please provide this summary of care documentation to your next provider. Your primary care clinician is listed as NONE. If you have any questions after today's visit, please call [phone].

## 2018-06-24 NOTE — PATIENT INSTRUCTIONS
Clear liquid diet x 2-3 days  Advance gradually    If sxs worsen/not better go to ED      Learning About Diverticulosis and Diverticulitis  What are diverticulosis and diverticulitis? In diverticulosis and diverticulitis, pouches called diverticula form in the wall of the large intestine, or colon. · In diverticulosis, the pouches do not cause any pain or other symptoms. · In diverticulitis, the pouches get inflamed or infected and cause symptoms. Doctors aren't sure what causes these pouches in the colon. But they think that a low-fiber diet may play a role. Without fiber to add bulk to the stool, the colon has to work harder than normal to push the stool forward. The pressure from this may cause pouches to form in weak spots along the colon. Some people with diverticulosis get diverticulitis. But experts don't know why this happens. What are the symptoms? · In diverticulosis, most people don't have symptoms. But pouches sometimes bleed. · In diverticulitis, symptoms may last from a few hours to a week or more. They include:  ¨ Belly pain. This is usually in the lower left side. It is sometimes worse when you move. This is the most common symptom. ¨ Fever and chills. ¨ Bloating and gas. ¨ Diarrhea or constipation. ¨ Nausea and sometimes vomiting. ¨ Not feeling like eating. How can you prevent these problems? You may be able to lower your chance of getting diverticulitis. You can do this by taking steps to prevent constipation. · Eat fruits, vegetables, beans, and whole grains every day. These foods are high in fiber. · Drink plenty of fluids (enough so that your urine is light yellow or clear like water). If you have kidney, heart, or liver disease and have to limit fluids, talk with your doctor before you increase the amount of fluids you drink. · Get at least 30 minutes of exercise on most days of the week. Walking is a good choice.  You also may want to do other activities, such as running, swimming, cycling, or playing tennis or team sports. · Take a fiber supplement, such as Citrucel or Metamucil, every day if needed. Read and follow all instructions on the label. · Schedule time each day for a bowel movement. Having a daily routine may help. Take your time and do not strain when having a bowel movement. Some people avoid nuts, seeds, berries, and popcorn. They believe that these foods might get trapped in the diverticula and cause pain. But there is no proof that these foods cause diverticulitis or make it worse. How are these problems treated? · The best way to treat diverticulosis is to avoid constipation. (See the tips above.)  · Treatment for diverticulitis includes antibiotics and often a change in your diet. You may need only liquids at first. Your doctor may suggest pain medicines for pain or belly cramps. In some cases, surgery may be needed. Follow-up care is a key part of your treatment and safety. Be sure to make and go to all appointments, and call your doctor if you are having problems. It's also a good idea to know your test results and keep a list of the medicines you take. Where can you learn more? Go to http://shawn-lachelle.info/. Enter F932 in the search box to learn more about \"Learning About Diverticulosis and Diverticulitis. \"  Current as of: May 12, 2017  Content Version: 11.4  © 4253-6451 Kozio. Care instructions adapted under license by [employer] Services (EPS) (which disclaims liability or warranty for this information). If you have questions about a medical condition or this instruction, always ask your healthcare professional. [address] any warranty or liability for your use of this information.

## 2018-06-24 NOTE — PROGRESS NOTES
Patient is a 40 y.o. female presenting with abdominal pain. Abdominal Pain    The history is provided by the patient. This is a new problem. The current episode started more than 2 days ago. The problem occurs daily. The problem has not changed since onset. The pain is associated with an unknown factor. The pain is moderate. Associated symptoms include diarrhea (h/o loose bowel movement usually 3-4 times/ day ) and nausea. Pertinent negatives include no fever, no belching, no flatus, no hematochezia, no melena, no vomiting, no constipation (no BM since yesterday ), no dysuria, no frequency and no hematuria. Nothing worsens the pain. The pain is relieved by nothing. Past workup includes no CT scan, no ultrasound, no esophagogastroduodenoscopy, no UGI, no colonoscopy. Her past medical history does not include irritable bowel syndrome. Past Medical History:   Diagnosis Date    Other ill-defined conditions(507.75)     ovarian cyst     Psychiatric disorder     suicide attempt, depression         Past Surgical History:   Procedure Laterality Date    HX APPENDECTOMY      1999    HX GYN      BTL    HX GYN      tubes tied 10/2008         Family History   Problem Relation Age of Onset    Heart Attack Mother     Hypertension Father     No Known Problems Sister     Cancer Maternal Aunt     No Known Problems Maternal Uncle     No Known Problems Paternal Aunt     Cancer Paternal Uncle      testicular cancer    Cancer Maternal Grandmother      lung/leukemia    Diabetes Maternal Grandfather     No Known Problems Paternal Grandmother     Stroke Paternal Grandfather     Asthma Son         Social History     Social History    Marital status:      Spouse name: N/A    Number of children: N/A    Years of education: N/A     Occupational History    Not on file.      Social History Main Topics    Smoking status: Current Every Day Smoker     Packs/day: 1.00     Years: 15.00     Types: Cigarettes    Smokeless tobacco: Never Used    Alcohol use 0.0 oz/week     0 Standard drinks or equivalent per week      Comment: occasional-weekends    Drug use: No    Sexual activity: Yes     Birth control/ protection: Surgical      Comment:       Other Topics Concern    Not on file     Social History Narrative                ALLERGIES: Beef containing products and Codeine    Review of Systems   Constitutional: Negative for fever. Gastrointestinal: Positive for abdominal pain, diarrhea (h/o loose bowel movement usually 3-4 times/ day ) and nausea. Negative for constipation (no BM since yesterday ), flatus, hematochezia, melena and vomiting. Genitourinary: Negative for dysuria, frequency and hematuria. All other systems reviewed and are negative. Vitals:    06/24/18 1335   BP: 114/86   Pulse: 100   Resp: 18   Temp: 98.5 °F (36.9 °C)   SpO2: 98%   Weight: 167 lb (75.8 kg)   Height: 5' 7\" (1.702 m)       Physical Exam   Constitutional: No distress. HENT:   Mouth/Throat: No oropharyngeal exudate. Eyes: No scleral icterus. Abdominal: Soft. Normal appearance and bowel sounds are normal. She exhibits distension. She exhibits no mass. There is no hepatosplenomegaly. There is tenderness in the periumbilical area and left lower quadrant. There is no rebound, no guarding and no CVA tenderness. Skin: No rash noted. Nursing note and vitals reviewed. MDM    Procedures    ICD-10-CM ICD-9-CM    1. Left lower quadrant pain R10.32 789.04 XR ABD (AP AND ERECT OR DECUB)    ? diverticulitis     clear liquid diet x 2-3 days- advance slowly    If no improvement/ worsen in 12 hours go to ED    Medications Ordered Today   Medications    alum-mag hydroxide-simeth (MYLANTA) 200-200-20 mg/5 mL susp     Sig: Take 30 mL by mouth once for 1 dose.      Dispense:  30 mL     Refill:  0     Order Specific Question:   Expiration Date     Answer:   6/24/2019     Order Specific Question:   Lot#     Answer:   [id number] Order Specific Question:        Answer:   Joe Monterroso     Order Specific Question:   NDC#     Answer:   [id number]    ondansetron (ZOFRAN ODT) 4 mg disintegrating tablet     Sig: Take 1 Tab by mouth every eight (8) hours as needed for Nausea. Dispense:  10 Tab     Refill:  0    dicyclomine (BENTYL) 20 mg tablet     Sig: Take 1 Tab by mouth every six (6) hours. Dispense:  12 Tab     Refill:  0    metroNIDAZOLE (FLAGYL) 250 mg tablet     Sig: Take 1 Tab by mouth three (3) times daily for 7 days. Dispense:  21 Tab     Refill:  0    ciprofloxacin HCl (CIPRO) 500 mg tablet     Sig: Take 1 Tab by mouth two (2) times a day. Dispense:  14 Tab     Refill:  0     Results for orders placed or performed in visit on 06/24/18   XR ABD (AP AND ERECT OR DECUB)    Narrative    EXAM:  XR ABD (AP AND ERECT OR DECUB)    INDICATION:   abdominal pain/ bloated    COMPARISON: Plain film 10/20/2015. Elisabet Stubbs FINDINGS: Supine and upright views of the abdomen demonstrate a normal gas  pattern. There is no free intraperitoneal air. No soft tissue masses or  pathologic calcifications are seen. The bones and soft tissues are within normal  limits. Tubal ligation clips in the pelvis and postsurgical clips in the right  lower abdomen are noted. Impression    IMPRESSION: No acute findings. The patients condition was discussed with the patient and they understand. The patient is to follow up with primary care doctor. If signs and symptoms become worse the pt is to go to the ER. The patient is to take medications as prescribed.

## 2018-06-24 NOTE — LETTER
[address] [facility] [address] 
[phone] Work/School Note Date: 6/24/2018 To Whom It May concern: Aliyah Hence was seen and treated today in the urgent care center by the following provider(s): 
No providers found. Aliyah Hence may return to work on 6/26/18. Sincerely, Davie Zamora MD

## 2018-06-25 ENCOUNTER — HOSPITAL ENCOUNTER (EMERGENCY)
Age: 38
Discharge: HOME | End: 2018-06-25
Attending: EMERGENCY MEDICINE
Payer: COMMERCIAL

## 2018-06-25 ENCOUNTER — APPOINTMENT (OUTPATIENT)
Dept: CT IMAGING | Age: 38
End: 2018-06-25
Attending: PHYSICIAN ASSISTANT
Payer: COMMERCIAL

## 2018-06-25 VITALS
HEIGHT: 67 IN | OXYGEN SATURATION: 100 % | HEART RATE: 93 BPM | WEIGHT: 167.99 LBS | TEMPERATURE: 98.9 F | DIASTOLIC BLOOD PRESSURE: 61 MMHG | RESPIRATION RATE: 16 BRPM | SYSTOLIC BLOOD PRESSURE: 119 MMHG | BODY MASS INDEX: 26.37 KG/M2

## 2018-06-25 DIAGNOSIS — R10.84 ABDOMINAL PAIN, GENERALIZED: Primary | ICD-10-CM

## 2018-06-25 LAB
ALBUMIN SERPL-MCNC: 3.8 G/DL (ref 3.5–5)
ALBUMIN/GLOB SERPL: 1.1 {RATIO} (ref 1.1–2.2)
ALP SERPL-CCNC: 78 U/L (ref 45–117)
ALT SERPL-CCNC: 29 U/L (ref 12–78)
ANION GAP SERPL CALC-SCNC: 8 MMOL/L (ref 5–15)
APPEARANCE UR: CLEAR
AST SERPL-CCNC: 15 U/L (ref 15–37)
BACTERIA URNS QL MICRO: NEGATIVE /HPF
BASOPHILS # BLD: 0.1 K/UL (ref 0–0.1)
BASOPHILS NFR BLD: 1 % (ref 0–1)
BILIRUB SERPL-MCNC: 0.5 MG/DL (ref 0.2–1)
BILIRUB UR QL: NEGATIVE
BUN SERPL-MCNC: 8 MG/DL (ref 6–20)
BUN/CREAT SERPL: 10 (ref 12–20)
CALCIUM SERPL-MCNC: 8.7 MG/DL (ref 8.5–10.1)
CHLORIDE SERPL-SCNC: 106 MMOL/L (ref 97–108)
CO2 SERPL-SCNC: 26 MMOL/L (ref 21–32)
COLOR UR: ABNORMAL
CREAT SERPL-MCNC: 0.84 MG/DL (ref 0.55–1.02)
DIFFERENTIAL METHOD BLD: ABNORMAL
EOSINOPHIL # BLD: 0.5 K/UL (ref 0–0.4)
EOSINOPHIL NFR BLD: 5 % (ref 0–7)
EPITH CASTS URNS QL MICRO: ABNORMAL /LPF
ERYTHROCYTE [DISTWIDTH] IN BLOOD BY AUTOMATED COUNT: 13.5 % (ref 11.5–14.5)
GLOBULIN SER CALC-MCNC: 3.5 G/DL (ref 2–4)
GLUCOSE SERPL-MCNC: 97 MG/DL (ref 65–100)
GLUCOSE UR STRIP.AUTO-MCNC: NEGATIVE MG/DL
HCG UR QL: NEGATIVE
HCT VFR BLD AUTO: 43.7 % (ref 35–47)
HGB BLD-MCNC: 15.1 G/DL (ref 11.5–16)
HGB UR QL STRIP: NEGATIVE
HYALINE CASTS URNS QL MICRO: ABNORMAL /LPF (ref 0–5)
IMM GRANULOCYTES # BLD: 0.1 K/UL (ref 0–0.04)
IMM GRANULOCYTES NFR BLD AUTO: 0 % (ref 0–0.5)
KETONES UR QL STRIP.AUTO: NEGATIVE MG/DL
LEUKOCYTE ESTERASE UR QL STRIP.AUTO: NEGATIVE
LIPASE SERPL-CCNC: 120 U/L (ref 73–393)
LYMPHOCYTES # BLD: 3.4 K/UL (ref 0.8–3.5)
LYMPHOCYTES NFR BLD: 29 % (ref 12–49)
MCH RBC QN AUTO: 33.7 PG (ref 26–34)
MCHC RBC AUTO-ENTMCNC: 34.6 G/DL (ref 30–36.5)
MCV RBC AUTO: 97.5 FL (ref 80–99)
MONOCYTES # BLD: 0.7 K/UL (ref 0–1)
MONOCYTES NFR BLD: 6 % (ref 5–13)
NEUTS SEG # BLD: 6.9 K/UL (ref 1.8–8)
NEUTS SEG NFR BLD: 59 % (ref 32–75)
NITRITE UR QL STRIP.AUTO: NEGATIVE
NRBC # BLD: 0 K/UL (ref 0–0.01)
NRBC BLD-RTO: 0 PER 100 WBC
PH UR STRIP: 7 [PH] (ref 5–8)
PLATELET # BLD AUTO: 286 K/UL (ref 150–400)
PMV BLD AUTO: 10.7 FL (ref 8.9–12.9)
POTASSIUM SERPL-SCNC: 3.6 MMOL/L (ref 3.5–5.1)
PROT SERPL-MCNC: 7.3 G/DL (ref 6.4–8.2)
PROT UR STRIP-MCNC: NEGATIVE MG/DL
RBC # BLD AUTO: 4.48 M/UL (ref 3.8–5.2)
RBC #/AREA URNS HPF: ABNORMAL /HPF (ref 0–5)
SODIUM SERPL-SCNC: 140 MMOL/L (ref 136–145)
SP GR UR REFRACTOMETRY: 1 (ref 1–1.03)
UA: UC IF INDICATED,UAUC: ABNORMAL
UROBILINOGEN UR QL STRIP.AUTO: 0.2 EU/DL (ref 0.2–1)
WBC # BLD AUTO: 11.7 K/UL (ref 3.6–11)
WBC URNS QL MICRO: ABNORMAL /HPF (ref 0–4)

## 2018-06-25 PROCEDURE — 74177 CT ABD & PELVIS W/CONTRAST: CPT

## 2018-06-25 PROCEDURE — 81025 URINE PREGNANCY TEST: CPT | Performed by: PHYSICIAN ASSISTANT

## 2018-06-25 PROCEDURE — 74011250637 HC RX REV CODE- 250/637: Performed by: EMERGENCY MEDICINE

## 2018-06-25 PROCEDURE — 74011000250 HC RX REV CODE- 250: Performed by: EMERGENCY MEDICINE

## 2018-06-25 PROCEDURE — 83690 ASSAY OF LIPASE: CPT

## 2018-06-25 PROCEDURE — 74011250636 HC RX REV CODE- 250/636: Performed by: EMERGENCY MEDICINE

## 2018-06-25 PROCEDURE — 85025 COMPLETE CBC W/AUTO DIFF WBC: CPT

## 2018-06-25 PROCEDURE — 36415 COLL VENOUS BLD VENIPUNCTURE: CPT

## 2018-06-25 PROCEDURE — 74011636320 HC RX REV CODE- 636/320: Performed by: EMERGENCY MEDICINE

## 2018-06-25 PROCEDURE — 81001 URINALYSIS AUTO W/SCOPE: CPT | Performed by: PHYSICIAN ASSISTANT

## 2018-06-25 PROCEDURE — 80053 COMPREHEN METABOLIC PANEL: CPT

## 2018-06-25 PROCEDURE — 99284 EMERGENCY DEPT VISIT MOD MDM: CPT

## 2018-06-25 RX ORDER — SODIUM CHLORIDE 9 MG/ML
50 INJECTION, SOLUTION INTRAVENOUS
Status: COMPLETED | OUTPATIENT
Start: 2018-06-25 | End: 2018-06-25

## 2018-06-25 RX ORDER — SODIUM CHLORIDE 0.9 % (FLUSH) 0.9 %
10 SYRINGE (ML) INJECTION
Status: COMPLETED | OUTPATIENT
Start: 2018-06-25 | End: 2018-06-25

## 2018-06-25 RX ORDER — FAMOTIDINE 20 MG/1
20 TABLET, FILM COATED ORAL 2 TIMES DAILY
Qty: 20 TAB | Refills: 0 | Status: SHIPPED | OUTPATIENT
Start: 2018-06-25 | End: 2019-02-24

## 2018-06-25 RX ORDER — LIDOCAINE HYDROCHLORIDE 20 MG/ML
10 SOLUTION OROPHARYNGEAL
Status: COMPLETED | OUTPATIENT
Start: 2018-06-25 | End: 2018-06-25

## 2018-06-25 RX ADMIN — IOPAMIDOL 100 ML: 755 INJECTION, SOLUTION INTRAVENOUS at 20:30

## 2018-06-25 RX ADMIN — LIDOCAINE HYDROCHLORIDE 10 ML: 20 SOLUTION ORAL; TOPICAL at 22:04

## 2018-06-25 RX ADMIN — SODIUM CHLORIDE 50 ML/HR: 900 INJECTION, SOLUTION INTRAVENOUS at 20:30

## 2018-06-25 RX ADMIN — ALUMINUM HYDROXIDE AND MAGNESIUM HYDROXIDE 30 ML: 200; 200 SUSPENSION ORAL at 22:04

## 2018-06-25 RX ADMIN — Medication 10 ML: at 20:30

## 2018-06-25 NOTE — PROGRESS NOTES
Spoke with CHAVEZ Peña at 1830 hrs. Patient still had no line.  Spoke with CHAVEZ Byers at 1940 hrs trying to start line on patient

## 2018-06-25 NOTE — ED PROVIDER NOTES
PROVIDER IN TRIAGE NOTE:  12:37 PM  Yg High PA-C has evaluated the patient as the Provider in Triage (PIT) for bloating for the past 2 days. Seen at [facility] yesterday. They have reviewed the vital signs and the triage nurse assessment. They have talked with the patient and any available family and advised that the appropriate studies have been ordered to initiate the work up based on the clinical presentation during the assessment. The pt has been advised that they will be accommodated in main ED or express care as soon as possible. The pt has been requested to contact the triage nurse or PIT immediately if they experiences any changes in their condition during this brief waiting period. EMERGENCY DEPARTMENT HISTORY AND PHYSICAL EXAM      Date: 6/25/2018  Patient Name: Radha Mora    History of Presenting Illness     Chief Complaint   Patient presents with   Denise Rice     reports was seen at [facility] for bloating yesterday with unremarkable xray. referred to ED if symptoms did not resolve.  Abdominal Pain    Vomiting       History Provided By: Patient    HPI: Radha Mora, 40 y.o. female with PMHx significant for depression, ovarian cyst, presents ambulatory to the ED with c/o worsening diffuse abdominal pain, L>R, with bloating x 1 week. She reports associated nausea, 2 episodes of vomiting today. Pt states her last BM was yesterday, but has been only passing little stool. Pt notes she was evaluated at [facility] yesterday, at which time she was prescribed Miralax, Cipro, and Flagyl. She conveys her symptoms have not improved with prescribed medication. Pt states her pain is similar to when she had c-diff 2 years ago, but denies any recent diarrhea. She notes a hx of appendectomy, but denies a hx of cholecystectomy. Pt specifically denies any recent fevers, chills, CP, SOB, urinary symptoms, rib pain, melena, or hematochezia.       There are no other complaints, changes, or physical findings at this time.    Current Outpatient Prescriptions   Medication Sig Dispense Refill    famotidine (PEPCID) 20 mg tablet Take 1 Tab by mouth two (2) times a day. 20 Tab 0    ondansetron (ZOFRAN ODT) 4 mg disintegrating tablet Take 1 Tab by mouth every eight (8) hours as needed for Nausea. 10 Tab 0    dicyclomine (BENTYL) 20 mg tablet Take 1 Tab by mouth every six (6) hours. 12 Tab 0    metroNIDAZOLE (FLAGYL) 250 mg tablet Take 1 Tab by mouth three (3) times daily for 7 days. 21 Tab 0    ciprofloxacin HCl (CIPRO) 500 mg tablet Take 1 Tab by mouth two (2) times a day. 14 Tab 0       Past History     Past Medical History:  Past Medical History:   Diagnosis Date    Other ill-defined conditions(989.02)     ovarian cyst     Psychiatric disorder     suicide attempt, depression        Past Surgical History:  Past Surgical History:   Procedure Laterality Date    HX APPENDECTOMY      1999    HX GYN      BTL    HX GYN      tubes tied 10/2008       Family History:  Family History   Problem Relation Age of Onset    Heart Attack Mother     Hypertension Father     No Known Problems Sister     Cancer Maternal Aunt     No Known Problems Maternal Uncle     No Known Problems Paternal Aunt     Cancer Paternal Uncle      testicular cancer    Cancer Maternal Grandmother      lung/leukemia    Diabetes Maternal Grandfather     No Known Problems Paternal Grandmother     Stroke Paternal Grandfather     Asthma Son        Social History:  Social History   Substance Use Topics    Smoking status: Current Every Day Smoker     Packs/day: 1.00     Years: 15.00     Types: Cigarettes    Smokeless tobacco: Never Used    Alcohol use 0.0 oz/week     0 Standard drinks or equivalent per week      Comment: occasional-weekends       Allergies:   Allergies   Allergen Reactions    Beef Containing Products Other (comments)     Gi distress     Codeine Hives         Review of Systems   Review of Systems   Constitutional: Negative for chills, fatigue and fever. HENT: Negative for congestion, rhinorrhea and sore throat. Eyes: Negative for pain, discharge and visual disturbance. Respiratory: Negative for cough, chest tightness, shortness of breath and wheezing. Cardiovascular: Negative for chest pain, palpitations and leg swelling. Gastrointestinal: Positive for abdominal pain, nausea and vomiting. Negative for blood in stool, constipation and diarrhea. Genitourinary: Negative for dysuria, frequency and hematuria. Musculoskeletal: Negative for arthralgias, back pain and myalgias. Skin: Negative for rash. Neurological: Negative for dizziness, weakness, light-headedness and headaches. Psychiatric/Behavioral: Negative. Physical Exam   Physical Exam   Constitutional: She is oriented to person, place, and time. She appears well-developed and well-nourished. No distress. HENT:   Head: Normocephalic and atraumatic. Eyes: EOM are normal. Right eye exhibits no discharge. Left eye exhibits no discharge. No scleral icterus. Neck: Normal range of motion. Neck supple. No tracheal deviation present. Cardiovascular: Normal rate, regular rhythm, normal heart sounds and intact distal pulses. Exam reveals no gallop and no friction rub. No murmur heard. Pulmonary/Chest: Effort normal and breath sounds normal. No respiratory distress. She has no wheezes. She has no rales. Abdominal: Soft. She exhibits no distension. There is no rebound and no guarding. Generalized abdominal tenderness   Musculoskeletal: Normal range of motion. She exhibits no edema. Lymphadenopathy:     She has no cervical adenopathy. Neurological: She is alert and oriented to person, place, and time. No focal neuro deficits   Skin: Skin is warm and dry. No rash noted. Psychiatric: She has a normal mood and affect. Nursing note and vitals reviewed.       Diagnostic Study Results     Labs -     Recent Results (from the past 12 hour(s))   URINALYSIS W/ REFLEX CULTURE    Collection Time: 06/25/18  4:59 PM   Result Value Ref Range    Color YELLOW/STRAW      Appearance CLEAR CLEAR      Specific gravity 1.005 1.003 - 1.030      pH (UA) 7.0 5.0 - 8.0      Protein NEGATIVE  NEG mg/dL    Glucose NEGATIVE  NEG mg/dL    Ketone NEGATIVE  NEG mg/dL    Bilirubin NEGATIVE  NEG      Blood NEGATIVE  NEG      Urobilinogen 0.2 0.2 - 1.0 EU/dL    Nitrites NEGATIVE  NEG      Leukocyte Esterase NEGATIVE  NEG      WBC 0-4 0 - 4 /hpf    RBC 0-5 0 - 5 /hpf    Epithelial cells MODERATE (A) FEW /lpf    Bacteria NEGATIVE  NEG /hpf    UA:UC IF INDICATED CULTURE NOT INDICATED BY UA RESULT CNI      Hyaline cast 0-2 0 - 5 /lpf   HCG URINE, QL    Collection Time: 06/25/18  4:59 PM   Result Value Ref Range    HCG urine, QL NEGATIVE  NEG     CBC WITH AUTOMATED DIFF    Collection Time: 06/25/18  5:17 PM   Result Value Ref Range    WBC 11.7 (H) 3.6 - 11.0 K/uL    RBC 4.48 3.80 - 5.20 M/uL    HGB 15.1 11.5 - 16.0 g/dL    HCT 43.7 35.0 - 47.0 %    MCV 97.5 80.0 - 99.0 FL    MCH 33.7 26.0 - 34.0 PG    MCHC 34.6 30.0 - 36.5 g/dL    RDW 13.5 11.5 - 14.5 %    PLATELET 349 403 - 117 K/uL    MPV 10.7 8.9 - 12.9 FL    NRBC 0.0 0  WBC    ABSOLUTE NRBC 0.00 0.00 - 0.01 K/uL    NEUTROPHILS 59 32 - 75 %    LYMPHOCYTES 29 12 - 49 %    MONOCYTES 6 5 - 13 %    EOSINOPHILS 5 0 - 7 %    BASOPHILS 1 0 - 1 %    IMMATURE GRANULOCYTES 0 0.0 - 0.5 %    ABS. NEUTROPHILS 6.9 1.8 - 8.0 K/UL    ABS. LYMPHOCYTES 3.4 0.8 - 3.5 K/UL    ABS. MONOCYTES 0.7 0.0 - 1.0 K/UL    ABS. EOSINOPHILS 0.5 (H) 0.0 - 0.4 K/UL    ABS. BASOPHILS 0.1 0.0 - 0.1 K/UL    ABS. IMM.  GRANS. 0.1 (H) 0.00 - 0.04 K/UL    DF AUTOMATED     METABOLIC PANEL, COMPREHENSIVE    Collection Time: 06/25/18  5:17 PM   Result Value Ref Range    Sodium 140 136 - 145 mmol/L    Potassium 3.6 3.5 - 5.1 mmol/L    Chloride 106 97 - 108 mmol/L    CO2 26 21 - 32 mmol/L    Anion gap 8 5 - 15 mmol/L    Glucose 97 65 - 100 mg/dL    BUN 8 6 - 20 MG/DL    Creatinine 0.84 0.55 - 1.02 MG/DL    BUN/Creatinine ratio 10 (L) 12 - 20      GFR est AA >60 >60 ml/min/1.73m2    GFR est non-AA >60 >60 ml/min/1.73m2    Calcium 8.7 8.5 - 10.1 MG/DL    Bilirubin, total 0.5 0.2 - 1.0 MG/DL    ALT (SGPT) 29 12 - 78 U/L    AST (SGOT) 15 15 - 37 U/L    Alk. phosphatase 78 45 - 117 U/L    Protein, total 7.3 6.4 - 8.2 g/dL    Albumin 3.8 3.5 - 5.0 g/dL    Globulin 3.5 2.0 - 4.0 g/dL    A-G Ratio 1.1 1.1 - 2.2     LIPASE    Collection Time: 06/25/18  5:17 PM   Result Value Ref Range    Lipase 120 73 - 393 U/L       Radiologic Studies -   CT ABD PELV W CONT   Final Result        CT Results  (Last 48 hours)               06/25/18 2040  CT ABD PELV W CONT Final result    Impression:  IMPRESSION:   No acute abnormality           Narrative:  EXAM:  CT ABD PELV W CONT       INDICATION: Abdominal pain       COMPARISON: June 24, 2018 and April 13, 2027       CONTRAST:  100 mL of Isovue-370. TECHNIQUE:    Following the uneventful intravenous administration of contrast, thin axial   images were obtained through the abdomen and pelvis. Coronal and sagittal   reconstructions were generated. Oral contrast was not administered. CT dose   reduction was achieved through use of a standardized protocol tailored for this   examination and automatic exposure control for dose modulation. FINDINGS:    LUNG BASES: Clear. INCIDENTALLY IMAGED HEART AND MEDIASTINUM: Unremarkable. LIVER: No mass or biliary dilatation. GALLBLADDER: Unremarkable. SPLEEN: No mass. PANCREAS: No mass or ductal dilatation. ADRENALS: Unremarkable. KIDNEYS: No mass, calculus, or hydronephrosis. STOMACH: Unremarkable. SMALL BOWEL: No dilatation or wall thickening. COLON: No dilatation or wall thickening. APPENDIX: Surgically absent   PERITONEUM: No ascites or pneumoperitoneum. RETROPERITONEUM: No lymphadenopathy or aortic aneurysm. REPRODUCTIVE ORGANS: Within normal limits   URINARY BLADDER: No mass or calculus. BONES: No destructive bone lesion. ADDITIONAL COMMENTS: N/A               CXR Results  (Last 48 hours)    None            Medical Decision Making   I am the first provider for this patient. I reviewed the vital signs, available nursing notes, past medical history, past surgical history, family history and social history. Vital Signs-Reviewed the patient's vital signs. Patient Vitals for the past 12 hrs:   Temp Pulse Resp BP SpO2   06/25/18 2200 - - - - 100 %   06/25/18 2130 - - - - 100 %   06/25/18 2100 - - - - 99 %   06/25/18 2030 - - - 119/61 99 %   06/25/18 2025 - - - - 100 %   06/25/18 2024 - - - 135/87 -   06/25/18 1615 98.9 °F (37.2 °C) 93 16 142/81 97 %       Pulse Oximetry Analysis - 100% on RA    Records Reviewed: Nursing Notes, Old Medical Records and Previous Laboratory Studies    Provider Notes (Medical Decision Making):   DDx: constipation, obstruction, ileus, diverticulitis, nephrolithiasis, cholecystitis, pancreatitis, UTI, pregnancy    Disposition: Patient is a 39 y/o female presenting to the ED with generalized abdominal pain and bloating. CBC, CMP, UA unremarkable. UPT negative. CT abdomen/pelvis negative for acute process. Doubt cholecystitis. Will tx for GERD vs PUD and have pt f/u with GI.     ED Course:   Initial assessment performed. The patients presenting problems have been discussed, and they are in agreement with the care plan formulated and outlined with them. I have encouraged them to ask questions as they arise throughout their visit. PROGRESS NOTE:  9:55 PM  CT a/p negative. Have instructed pt to discontinue Cipro and Flagyl given negative CT. Advised GI/PCP follow up. Discussed results, prescriptions and follow up plan with patient. Provided customary return to ED instructions. Patient expressed understanding. Deette Jeans, MD     Discharge Note:  10:00 PM  The patient has been re-evaluated and is ready for discharge. Reviewed available results with patient. Counseled patient on diagnosis and care plan. Patient has expressed understanding, and all questions have been answered. Patient agrees with plan and agrees to follow up as recommended, or to return to the ED if their symptoms worsen. Discharge instructions have been provided and explained to the patient, along with reasons to return to the ED. PLAN:  1. Discharge Medication List as of 2018 10:01 PM      START taking these medications    Details   famotidine (PEPCID) 20 mg tablet Take 1 Tab by mouth two (2) times a day., Normal, Disp-20 Tab, R-0         CONTINUE these medications which have NOT CHANGED    Details   ondansetron (ZOFRAN ODT) 4 mg disintegrating tablet Take 1 Tab by mouth every eight (8) hours as needed for Nausea., Normal, Disp-10 Tab, R-0      dicyclomine (BENTYL) 20 mg tablet Take 1 Tab by mouth every six (6) hours. , Normal, Disp-12 Tab, R-0      metroNIDAZOLE (FLAGYL) 250 mg tablet Take 1 Tab by mouth three (3) times daily for 7 days. , Normal, Disp-21 Tab, R-0      ciprofloxacin HCl (CIPRO) 500 mg tablet Take 1 Tab by mouth two (2) times a day., Normal, Disp-14 Tab, R-0         STOP taking these medications       alum-mag hydroxide-simeth (MYLANTA) 200-200-20 mg/5 mL susp Comments:   Reason for Stoppin.   Follow-up Information     Follow up With Details Comments Alivia Quiroz MD In 3 days  [address] [place] [address]  [phone]      [facility] EMERGENCY DEPT  As needed, If symptoms worsen [address] [address]  [phone]        Return to ED if worse     Diagnosis     Clinical Impression:   1. Abdominal pain, generalized        Attestations: This note is prepared by Zachariah Cage, acting as Scribe for Shaun Multani MD.    The scribe's documentation has been prepared under my direction and personally reviewed by me in its entirety.  I confirm that the note above accurately reflects all work, treatment, procedures, and medical decision making performed by me.   Josselyn Rodriguez MD

## 2018-06-25 NOTE — LETTER
[address] 
[facility] EMERGENCY DEPT 
[address] [place] [address] [address] [id number] [phone] Work/School Note Date: 6/25/2018 To Whom It May concern: Joao Triana was seen and treated today in the emergency room by the following provider(s): 
Attending Provider: Cruz Asencio MD.   
 
Joao Triana may return to work on 6/27/18. Sincerely, [id number] [employer]

## 2018-06-26 NOTE — DISCHARGE INSTRUCTIONS
Abdominal Pain: Care Instructions  Your Care Instructions    Abdominal pain has many possible causes. Some aren't serious and get better on their own in a few days. Others need more testing and treatment. If your pain continues or gets worse, you need to be rechecked and may need more tests to find out what is wrong. You may need surgery to correct the problem. Don't ignore new symptoms, such as fever, nausea and vomiting, urination problems, pain that gets worse, and dizziness. These may be signs of a more serious problem. Your doctor may have recommended a follow-up visit in the next 8 to 12 hours. If you are not getting better, you may need more tests or treatment. The doctor has checked you carefully, but problems can develop later. If you notice any problems or new symptoms, get medical treatment right away. Follow-up care is a key part of your treatment and safety. Be sure to make and go to all appointments, and call your doctor if you are having problems. It's also a good idea to know your test results and keep a list of the medicines you take. How can you care for yourself at home? · Rest until you feel better. · To prevent dehydration, drink plenty of fluids, enough so that your urine is light yellow or clear like water. Choose water and other caffeine-free clear liquids until you feel better. If you have kidney, heart, or liver disease and have to limit fluids, talk with your doctor before you increase the amount of fluids you drink. · If your stomach is upset, eat mild foods, such as rice, dry toast or crackers, bananas, and applesauce. Try eating several small meals instead of two or three large ones. · Wait until 48 hours after all symptoms have gone away before you have spicy foods, alcohol, and drinks that contain caffeine. · Do not eat foods that are high in fat. · Avoid anti-inflammatory medicines such as aspirin, ibuprofen (Advil, Motrin), and naproxen (Aleve).  These can cause stomach upset. Talk to your doctor if you take daily aspirin for another health problem. When should you call for help? Call 911 anytime you think you may need emergency care. For example, call if:  ? · You passed out (lost consciousness). ? · You pass maroon or very bloody stools. ? · You vomit blood or what looks like coffee grounds. ? · You have new, severe belly pain. ?Call your doctor now or seek immediate medical care if:  ? · Your pain gets worse, especially if it becomes focused in one area of your belly. ? · You have a new or higher fever. ? · Your stools are black and look like tar, or they have streaks of blood. ? · You have unexpected vaginal bleeding. ? · You have symptoms of a urinary tract infection. These may include:  ¨ Pain when you urinate. ¨ Urinating more often than usual.  ¨ Blood in your urine. ? · You are dizzy or lightheaded, or you feel like you may faint. ? Watch closely for changes in your health, and be sure to contact your doctor if:  ? · You are not getting better after 1 day (24 hours). Where can you learn more? Go to http://shawn-lachelle.info/. Enter J644 in the search box to learn more about \"Abdominal Pain: Care Instructions. \"  Current as of: March 20, 2017  Content Version: 11.4  © 4940-2915 [employer]. Care instructions adapted under license by [employer] (which disclaims liability or warranty for this information). If you have questions about a medical condition or this instruction, always ask your healthcare professional. [place] [address] any warranty or liability for your use of this information.

## 2018-06-26 NOTE — ED NOTES
Provider at bedside for dispo and follow up. Discharge plan reviewed and paperwork signed, teaching completed including treatment received, medications and follow up plan of care, pain level within manageable comfortable limits, IV removed, ambulatory to exit, gait steady, safety maintained.

## 2018-06-26 NOTE — ED NOTES
Assumed care of pt from triage. Pt complaining of abd pain (worse on left), bloating, n/v, and constipation x1 wk that has not improved. Pt was seen at [facility] yesterday and given Miralax. Pt reports passing very small BM after miralax, otherwise, last BM was Saturday. Pt report n/v and states she can't hold anything down. Pt tried soup yesterday and was unable to keep down. Pt has hx of appendectomy and tubal ligation. Pt in no acute distress at this time. Family at bedside. VSS. Call bell within reach.

## 2019-02-24 ENCOUNTER — OFFICE VISIT (OUTPATIENT)
Dept: URGENT CARE | Age: 39
End: 2019-02-24

## 2019-02-24 VITALS
RESPIRATION RATE: 16 BRPM | TEMPERATURE: 98.5 F | HEART RATE: 95 BPM | BODY MASS INDEX: 25.74 KG/M2 | DIASTOLIC BLOOD PRESSURE: 89 MMHG | WEIGHT: 164 LBS | SYSTOLIC BLOOD PRESSURE: 134 MMHG | HEIGHT: 67 IN | OXYGEN SATURATION: 98 %

## 2019-02-24 DIAGNOSIS — Z20.828 EXPOSURE TO THE FLU: ICD-10-CM

## 2019-02-24 DIAGNOSIS — J20.9 ACUTE BRONCHITIS, UNSPECIFIED ORGANISM: Primary | ICD-10-CM

## 2019-02-24 RX ORDER — PROMETHAZINE HYDROCHLORIDE AND DEXTROMETHORPHAN HYDROBROMIDE 6.25; 15 MG/5ML; MG/5ML
5 SYRUP ORAL
Qty: 60 ML | Refills: 0 | Status: SHIPPED | OUTPATIENT
Start: 2019-02-24 | End: 2019-04-06

## 2019-02-24 RX ORDER — AZITHROMYCIN 250 MG/1
TABLET, FILM COATED ORAL
Qty: 6 TAB | Refills: 0 | Status: SHIPPED | OUTPATIENT
Start: 2019-02-24 | End: 2019-04-06

## 2019-02-24 RX ORDER — OSELTAMIVIR PHOSPHATE 75 MG/1
75 CAPSULE ORAL 2 TIMES DAILY
Qty: 10 CAP | Refills: 0 | Status: SHIPPED | OUTPATIENT
Start: 2019-02-24 | End: 2019-03-01

## 2019-02-24 RX ORDER — BENZONATATE 200 MG/1
200 CAPSULE ORAL
Qty: 21 CAP | Refills: 0 | Status: SHIPPED | OUTPATIENT
Start: 2019-02-24 | End: 2019-03-03

## 2019-02-24 NOTE — PATIENT INSTRUCTIONS
Bronchitis: Care Instructions  Your Care Instructions    Bronchitis is inflammation of the bronchial tubes, which carry air to the lungs. The tubes swell and produce mucus, or phlegm. The mucus and inflamed bronchial tubes make you cough. You may have trouble breathing. Most cases of bronchitis are caused by viruses like those that cause colds. Antibiotics usually do not help and they may be harmful. Bronchitis usually develops rapidly and lasts about 2 to 3 weeks in otherwise healthy people. Follow-up care is a key part of your treatment and safety. Be sure to make and go to all appointments, and call your doctor if you are having problems. It's also a good idea to know your test results and keep a list of the medicines you take. How can you care for yourself at home? · Take all medicines exactly as prescribed. Call your doctor if you think you are having a problem with your medicine. · Get some extra rest.  · Take an over-the-counter pain medicine, such as acetaminophen (Tylenol), ibuprofen (Advil, Motrin), or naproxen (Aleve) to reduce fever and relieve body aches. Read and follow all instructions on the label. · Do not take two or more pain medicines at the same time unless the doctor told you to. Many pain medicines have acetaminophen, which is Tylenol. Too much acetaminophen (Tylenol) can be harmful. · Take an over-the-counter cough medicine that contains dextromethorphan to help quiet a dry, hacking cough so that you can sleep. Avoid cough medicines that have more than one active ingredient. Read and follow all instructions on the label. · Breathe moist air from a humidifier, hot shower, or sink filled with hot water. The heat and moisture will thin mucus so you can cough it out. · Do not smoke. Smoking can make bronchitis worse. If you need help quitting, talk to your doctor about stop-smoking programs and medicines. These can increase your chances of quitting for good.   When should you call for help? Call 911 anytime you think you may need emergency care. For example, call if:    · You have severe trouble breathing.    Call your doctor now or seek immediate medical care if:    · You have new or worse trouble breathing.     · You cough up dark brown or bloody mucus (sputum).     · You have a new or higher fever.     · You have a new rash.    Watch closely for changes in your health, and be sure to contact your doctor if:    · You cough more deeply or more often, especially if you notice more mucus or a change in the color of your mucus.     · You are not getting better as expected. Where can you learn more? Go to http://shawn-lachelle.info/. Enter H333 in the search box to learn more about \"Bronchitis: Care Instructions. \"  Current as of: September 5, 2018  Content Version: 11.9  © 1426-1023 [employer]. Care instructions adapted under license by [employer] (which disclaims liability or warranty for this information). If you have questions about a medical condition or this instruction, always ask your healthcare professional. [address] any warranty or liability for your use of this information.

## 2019-02-24 NOTE — PROGRESS NOTES
Cough   The history is provided by the patient. This is a new problem. The current episode started more than 1 week ago. The problem occurs every few minutes. The problem has been gradually worsening. The cough is non-productive. There has been no fever. Associated symptoms include chills, sore throat and myalgias. Pertinent negatives include no wheezing and no nausea. She has tried nothing for the symptoms. She is a smoker. Her past medical history is significant for bronchitis. Her past medical history does not include asthma.         Past Medical History:   Diagnosis Date    Other ill-defined conditions(263.81)     ovarian cyst     Psychiatric disorder     suicide attempt, depression         Past Surgical History:   Procedure Laterality Date    HX APPENDECTOMY      1999    HX GYN      BTL    HX GYN      tubes tied 10/2008         Family History   Problem Relation Age of Onset    Heart Attack Mother     Hypertension Father     No Known Problems Sister     Cancer Maternal Aunt     No Known Problems Maternal Uncle     No Known Problems Paternal Aunt     Cancer Paternal Uncle         testicular cancer    Cancer Maternal Grandmother         lung/leukemia    Diabetes Maternal Grandfather     No Known Problems Paternal Grandmother     Stroke Paternal Grandfather     Asthma Son         Social History     Socioeconomic History    Marital status:      Spouse name: Not on file    Number of children: Not on file    Years of education: Not on file    Highest education level: Not on file   Social Needs    Financial resource strain: Not on file    Food insecurity - worry: Not on file    Food insecurity - inability: Not on file   [employer] needs - medical: Not on file   [employer] needs - non-medical: Not on file   Occupational History    Not on file   Tobacco Use    Smoking status: Current Every Day Smoker     Packs/day: 1.00     Years: 15.00     Pack years: 15.00     Types: Cigarettes    Smokeless tobacco: Never Used   Substance and Sexual Activity    Alcohol use: Yes     Alcohol/week: 0.0 oz     Comment: occasional-weekends    Drug use: No    Sexual activity: Yes     Birth control/protection: Surgical     Comment:     Other Topics Concern    Not on file   Social History Narrative    Not on file                ALLERGIES: Beef containing products and Codeine    Review of Systems   Constitutional: Positive for chills. HENT: Positive for sore throat. Respiratory: Positive for cough. Negative for wheezing. Gastrointestinal: Negative for nausea. Musculoskeletal: Positive for myalgias. All other systems reviewed and are negative. Vitals:    02/24/19 1026   BP: 134/89   Pulse: 95   Resp: 16   Temp: 98.5 °F (36.9 °C)   SpO2: 98%   Weight: 164 lb (74.4 kg)   Height: 5' 7\" (1.702 m)       Physical Exam   Constitutional: No distress. HENT:   Right Ear: Tympanic membrane and ear canal normal.   Left Ear: Tympanic membrane and ear canal normal.   Nose: Nose normal.   Mouth/Throat: No oropharyngeal exudate, posterior oropharyngeal edema or posterior oropharyngeal erythema. Eyes: Conjunctivae are normal. Right eye exhibits no discharge. Left eye exhibits no discharge. Neck: Neck supple. Pulmonary/Chest: Effort normal. No respiratory distress. She has decreased breath sounds. She has no wheezes. She has rhonchi. She has no rales. Lymphadenopathy:     She has no cervical adenopathy. Skin: No rash noted. Nursing note and vitals reviewed. MDM    Procedures        ICD-10-CM ICD-9-CM    1. Acute bronchitis, unspecified organism J20.9 466.0    2.  Exposure to the flu Z20.828 V01.79      Medications Ordered Today   Medications    azithromycin (ZITHROMAX) 250 mg tablet     Sig: Take two tablets today then one tablet daily     Dispense:  6 Tab     Refill:  0    benzonatate (TESSALON) 200 mg capsule     Sig: Take 1 Cap by mouth three (3) times daily as needed for Cough for up to 7 days. Dispense:  21 Cap     Refill:  0    promethazine-dextromethorphan (PROMETHAZINE-DM) 6.25-15 mg/5 mL syrup     Sig: Take 5 mL by mouth nightly as needed for Cough. Dispense:  60 mL     Refill:  0    oseltamivir (TAMIFLU) 75 mg capsule     Sig: Take 1 Cap by mouth two (2) times a day for 5 days. Dispense:  10 Cap     Refill:  0     No results found for any visits on 02/24/19. The patients condition was discussed with the patient and they understand. The patient is to follow up with primary care doctor. If signs and symptoms become worse the pt is to go to the ER. The patient is to take medications as prescribed.

## 2019-02-24 NOTE — LETTER
NOTIFICATION RETURN TO WORK / SCHOOL 
 
2/24/2019 10:47 AM 
 
Ms. Emery Dumont [address]  [facility] [address] To Whom It May Concern: Emery Dumont is currently under the care of [address] [facility]. She will return to work/school on 2/26/19. If there are questions or concerns please have the patient contact our office. Sincerely, E PROVIDER

## 2019-04-06 ENCOUNTER — OFFICE VISIT (OUTPATIENT)
Dept: URGENT CARE | Age: 39
End: 2019-04-06

## 2019-04-06 VITALS
SYSTOLIC BLOOD PRESSURE: 138 MMHG | HEIGHT: 67 IN | OXYGEN SATURATION: 98 % | RESPIRATION RATE: 16 BRPM | DIASTOLIC BLOOD PRESSURE: 91 MMHG | TEMPERATURE: 98.8 F | HEART RATE: 92 BPM | WEIGHT: 164 LBS | BODY MASS INDEX: 25.74 KG/M2

## 2019-04-06 DIAGNOSIS — R05.9 COUGH: Primary | ICD-10-CM

## 2019-04-06 RX ORDER — PREDNISONE 5 MG/1
TABLET ORAL
Qty: 21 TAB | Refills: 0 | Status: SHIPPED | OUTPATIENT
Start: 2019-04-06 | End: 2019-05-24

## 2019-04-06 RX ORDER — LEVOCETIRIZINE DIHYDROCHLORIDE 5 MG/1
5 TABLET, FILM COATED ORAL DAILY
Qty: 30 TAB | Refills: 0 | Status: SHIPPED | OUTPATIENT
Start: 2019-04-06 | End: 2019-05-24

## 2019-04-06 NOTE — PATIENT INSTRUCTIONS
Follow up with PCP without improvement over next 5-7 days sooner/immediately for new or worsening       Cough: Care Instructions  Your Care Instructions    A cough is your body's response to something that bothers your throat or airways. Many things can cause a cough. You might cough because of a cold or the flu, bronchitis, or asthma. Smoking, postnasal drip, allergies, and stomach acid that backs up into your throat also can cause coughs. A cough is a symptom, not a disease. Most coughs stop when the cause, such as a cold, goes away. You can take a few steps at home to cough less and feel better. Follow-up care is a key part of your treatment and safety. Be sure to make and go to all appointments, and call your doctor if you are having problems. It's also a good idea to know your test results and keep a list of the medicines you take. How can you care for yourself at home? · Drink lots of water and other fluids. This helps thin the mucus and soothes a dry or sore throat. Honey or lemon juice in hot water or tea may ease a dry cough. · Take cough medicine as directed by your doctor. · Prop up your head on pillows to help you breathe and ease a dry cough. · Try cough drops to soothe a dry or sore throat. Cough drops don't stop a cough. Medicine-flavored cough drops are no better than candy-flavored drops or hard candy. · Do not smoke. Avoid secondhand smoke. If you need help quitting, talk to your doctor about stop-smoking programs and medicines. These can increase your chances of quitting for good. When should you call for help? Call 911 anytime you think you may need emergency care.  For example, call if:    · You have severe trouble breathing.    Call your doctor now or seek immediate medical care if:    · You cough up blood.     · You have new or worse trouble breathing.     · You have a new or higher fever.     · You have a new rash.    Watch closely for changes in your health, and be sure to contact your doctor if:    · You cough more deeply or more often, especially if you notice more mucus or a change in the color of your mucus.     · You have new symptoms, such as a sore throat, an earache, or sinus pain.     · You do not get better as expected. Where can you learn more? Go to http://shawn-lachelle.info/. Enter D279 in the search box to learn more about \"Cough: Care Instructions. \"  Current as of: September 5, 2018  Content Version: 11.9  © 5636-9836 [employer]. Care instructions adapted under license by [employer] (which disclaims liability or warranty for this information). If you have questions about a medical condition or this instruction, always ask your healthcare professional. [address] any warranty or liability for your use of this information.

## 2019-04-06 NOTE — PROGRESS NOTES
Here for cough and nasal congestion  Present throughout the day without any fever, chills, SOB  Some nasal congestion and post nasal drip  Seen and treated for bronchitis approx 1 month ago; feels some improvement  Is still a daily smoker  Has not had relief with OTC meds             Past Medical History:   Diagnosis Date    Other ill-defined conditions(899.89)     ovarian cyst     Psychiatric disorder     suicide attempt, depression         Past Surgical History:   Procedure Laterality Date    HX APPENDECTOMY      1999    HX GYN      BTL    HX GYN      tubes tied 10/2008         Family History   Problem Relation Age of Onset    Heart Attack Mother     Hypertension Father     No Known Problems Sister     Cancer Maternal Aunt     No Known Problems Maternal Uncle     No Known Problems Paternal Aunt     Cancer Paternal Uncle         testicular cancer    Cancer Maternal Grandmother         lung/leukemia    Diabetes Maternal Grandfather     No Known Problems Paternal Grandmother     Stroke Paternal Grandfather     Asthma Son         Social History     Socioeconomic History    Marital status:      Spouse name: Not on file    Number of children: Not on file    Years of education: Not on file    Highest education level: Not on file   Occupational History    Not on file   Social Needs    Financial resource strain: Not on file    Food insecurity:     Worry: Not on file     Inability: Not on file    Transportation needs:     Medical: Not on file     Non-medical: Not on file   Tobacco Use    Smoking status: Current Every Day Smoker     Packs/day: 1.00     Years: 15.00     Pack years: 15.00     Types: Cigarettes    Smokeless tobacco: Never Used   Substance and Sexual Activity    Alcohol use:  Yes     Alcohol/week: 0.0 oz     Comment: occasional-weekends    Drug use: No    Sexual activity: Yes     Birth control/protection: Surgical     Comment:     Lifestyle    Physical activity: Days per week: Not on file     Minutes per session: Not on file    Stress: Not on file   Relationships    Social connections:     Talks on phone: Not on file     Gets together: Not on file     Attends [employer] service: Not on file     Active member of club or organization: Not on file     Attends meetings of clubs or organizations: Not on file     Relationship status: Not on file    Intimate partner violence:     Fear of current or ex partner: Not on file     Emotionally abused: Not on file     Physically abused: Not on file     Forced sexual activity: Not on file   Other Topics Concern    Not on file   Social History Narrative    Not on file                ALLERGIES: Beef containing products and Codeine    Review of Systems   Gastrointestinal: Negative for nausea and vomiting. Skin: Negative for rash. Neurological: Negative for dizziness. All other systems reviewed and are negative. Vitals:    04/06/19 1719   BP: (!) 138/91   Pulse: 92   Resp: 16   Temp: 98.8 °F (37.1 °C)   SpO2: 98%   Weight: 164 lb (74.4 kg)   Height: 5' 7\" (1.702 m)       Physical Exam   Constitutional: She is oriented to person, place, and time. No distress. Non-toxic appearing, well hydrated   HENT:   Pale swollen nasal turbiantes bilat. OP clear and moist.TMs normal.    Eyes: Pupils are equal, round, and reactive to light. Conjunctivae and EOM are normal. No scleral icterus. Neck: Normal range of motion. Neck supple. Cardiovascular: Normal rate, regular rhythm and normal heart sounds. Exam reveals no gallop and no friction rub. No murmur heard. Pulmonary/Chest: Effort normal and breath sounds normal. No respiratory distress. She has no wheezes. She has no rales. Lymphadenopathy:     She has no cervical adenopathy. Neurological: She is alert and oriented to person, place, and time. Skin: Skin is warm and dry. No rash noted. She is not diaphoretic. No erythema. No pallor.    Psychiatric: She has a normal mood and affect. Her behavior is normal. Thought content normal.   Nursing note and vitals reviewed. MDM     Differential Diagnosis; Clinical Impression; Plan:         CLINICAL IMPRESSION:  (R05) Cough  (primary encounter diagnosis)    Orders Placed This Encounter      levocetirizine (XYZAL) 5 mg tablet          Sig: Take 1 Tab by mouth daily. Dispense:  30 Tab          Refill:  0      predniSONE (STERAPRED) 5 mg dose pack          Sig: See administration instruction per 5mg dose pack          Dispense:  21 Tab          Refill:  0      Plan:  Not ready to quit smoking  Possible allergic cough or irritant cough  Start xyzal.  Prednisone if not improving; take with food  Low suspcion for bacterial LRI. We have reviewed concerning signs/symptoms, normal vs abnormal progression of medical condition and when to seek immediate medical attention. Schedule with PCP or Urgent Care immediately for worsening or new symptoms. See your PCP if there is not at least some improvement in symptoms within the next 1 week  You should see your PCP for updates on your routine health maintenance.              Procedures

## 2019-04-06 NOTE — LETTER
NOTIFICATION RETURN TO WORK / SCHOOL 
 
4/6/2019 6:07 PM 
 
Ms. Johnathon Michel [address] [facility] [address] To Whom It May Concern: Johnathon Michel is currently under the care of [address] [facility] [address]. She will return to work/school on: 04/09/2019 If there are questions or concerns please have the patient contact our office. Sincerely, E PROVIDER

## 2019-05-24 RX ORDER — IBUPROFEN 200 MG
800 TABLET ORAL
Status: ON HOLD | COMMUNITY
End: 2019-05-28 | Stop reason: SDUPTHER

## 2019-05-24 RX ORDER — OXYCODONE AND ACETAMINOPHEN 5; 325 MG/1; MG/1
1 TABLET ORAL
COMMUNITY
End: 2019-08-26

## 2019-05-24 NOTE — PERIOP NOTES
[facility]  Preoperative Instructions        Surgery Date 5/28/2019          Time of Arrival 1030    1. On the day of your surgery, please report to the Surgical Services Registration Desk and sign in at your designated time. The Surgery Center is located to the right of the Emergency Room. 2. You must have someone with you to drive you home. You should not drive a car for 24 hours following surgery. Please make arrangements for a friend or family member to stay with you for the first 24 hours after your surgery. 3. Do not have anything to eat or drink (including water, gum, mints, coffee, juice) after midnight 5/27/2019. ? This may not apply to medications prescribed by your physician. ?(Please note below the special instructions with medications to take the morning of your procedure.)    4. We recommend you do not drink any alcoholic beverages for 24 hours before and after your surgery. 5. Contact your surgeons office for instructions on the following medications: non-steroidal anti-inflammatory drugs (i.e. Advil, Aleve), vitamins, and supplements. (Some surgeons will want you to stop these medications prior to surgery and others may allow you to take them)  **If you are currently taking Plavix, Coumadin, Aspirin and/or other blood-thinning agents, contact your surgeon for instructions. ** Your surgeon will partner with the physician prescribing these medications to determine if it is safe to stop or if you need to continue taking. Please do not stop taking these medications without instructions from your surgeon    6. Wear comfortable clothes. Wear glasses instead of contacts. Do not bring any money or jewelry. Please bring picture ID, insurance card, and any prearranged co-payment or hospital payment. Do not wear make-up, particularly mascara the morning of your surgery. Do not wear nail polish, particularly if you are having foot /hand surgery.   Wear your hair loose or down, no ponytails, buns, ofe pins or clips. All body piercings must be removed. Please shower with antibacterial soap for three consecutive days before and on the morning of surgery, but do not apply any lotions, powders or deodorants after the shower on the day of surgery. Please use a fresh towels after each shower. Please sleep in clean clothes and change bed linens the night before surgery. Please do not shave for 48 hours prior to surgery. Shaving of the face is acceptable. 7. You should understand that if you do not follow these instructions your surgery may be cancelled. If your physical condition changes (I.e. fever, cold or flu) please contact your surgeon as soon as possible. 8. It is important that you be on time. If a situation occurs where you may be late, please call [phone] (OR Holding Area). 9. If you have any questions and or problems, please call [phone] (Pre-admission Testing). 10. Your surgery time may be subject to change. You will receive a phone call the evening prior if your time changes. 11.  If having outpatient surgery, you must have someone to drive you here, stay with you during the duration of your stay, and to drive you home at time of discharge. 12.   In an effort to improve the efficiency, privacy, and safety for all of our Pre-op patients visitors are not allowed in the Holding area. Once you arrive and are registered your family/visitors will be asked to remain in the waiting room. The Pre-op staff will get you from the Surgical Waiting Area and will explain to you and your family/visitors that the Pre-op phase is beginning. The staff will answer any questions and provide instructions for tracking of the patient, by use of the existing tracking number and color-coded status board in the waiting room.   At this time the staff will also ask for your designated spokesperson information in the event that the physician or staff need to provide an update or obtain any pertinent information. The designated spokesperson will be notified if the physician needs to speak to family during the pre-operative phase. If at any time your family/visitors has questions or concerns they may approach the volunteer desk in the waiting area for assistance. Special Instructions:    MEDICATIONS TO TAKE THE MORNING OF SURGERY WITH A SIP OF WATER: Percocet      I understand a pre-operative phone call will be made to verify my surgery time. In the event that I am not available, I give permission for a message to be left on my answering service and/or with another person?   Yes patient at [phone].         ___________________      __________   _________    (Signature of Patient)             (Witness)                (Date and Time)

## 2019-05-28 ENCOUNTER — ANESTHESIA EVENT (OUTPATIENT)
Dept: SURGERY | Age: 39
End: 2019-05-28
Payer: MEDICAID

## 2019-05-28 ENCOUNTER — ANESTHESIA (OUTPATIENT)
Dept: SURGERY | Age: 39
End: 2019-05-28
Payer: MEDICAID

## 2019-05-28 ENCOUNTER — HOSPITAL ENCOUNTER (OUTPATIENT)
Age: 39
Setting detail: OUTPATIENT SURGERY
Discharge: HOME | End: 2019-05-28
Attending: SPECIALIST | Admitting: SPECIALIST
Payer: MEDICAID

## 2019-05-28 VITALS
OXYGEN SATURATION: 95 % | RESPIRATION RATE: 18 BRPM | DIASTOLIC BLOOD PRESSURE: 67 MMHG | BODY MASS INDEX: 26.4 KG/M2 | HEART RATE: 82 BPM | SYSTOLIC BLOOD PRESSURE: 135 MMHG | HEIGHT: 67 IN | WEIGHT: 168.21 LBS | TEMPERATURE: 97.7 F

## 2019-05-28 LAB — HCG UR QL: NEGATIVE

## 2019-05-28 PROCEDURE — 77030034850: Performed by: SPECIALIST

## 2019-05-28 PROCEDURE — 74011250636 HC RX REV CODE- 250/636: Performed by: SPECIALIST

## 2019-05-28 PROCEDURE — 76010000934 HC OR TIME 1 TO 1.5HR INTENSV - TIER 2: Performed by: SPECIALIST

## 2019-05-28 PROCEDURE — 77030002895 HC DEV VASC CLOSR COVD -B: Performed by: SPECIALIST

## 2019-05-28 PROCEDURE — 74011250636 HC RX REV CODE- 250/636: Performed by: ANESTHESIOLOGY

## 2019-05-28 PROCEDURE — 74011000250 HC RX REV CODE- 250

## 2019-05-28 PROCEDURE — 77030031139 HC SUT VCRL2 J&J -A: Performed by: SPECIALIST

## 2019-05-28 PROCEDURE — 88305 TISSUE EXAM BY PATHOLOGIST: CPT

## 2019-05-28 PROCEDURE — 74011250636 HC RX REV CODE- 250/636

## 2019-05-28 PROCEDURE — 77030039266 HC ADH SKN EXOFIN S2SG -A: Performed by: SPECIALIST

## 2019-05-28 PROCEDURE — 77030008771 HC TU NG SALEM SUMP -A: Performed by: NURSE ANESTHETIST, CERTIFIED REGISTERED

## 2019-05-28 PROCEDURE — 77030020782 HC GWN BAIR PAWS FLX 3M -B

## 2019-05-28 PROCEDURE — 77030018836 HC SOL IRR NACL ICUM -A: Performed by: SPECIALIST

## 2019-05-28 PROCEDURE — 74011250637 HC RX REV CODE- 250/637: Performed by: ANESTHESIOLOGY

## 2019-05-28 PROCEDURE — 77030009852 HC PCH RTVR ENDOSC COVD -B: Performed by: SPECIALIST

## 2019-05-28 PROCEDURE — 77030020703 HC SEAL CANN DISP INTU -B: Performed by: SPECIALIST

## 2019-05-28 PROCEDURE — 77030002933 HC SUT MCRYL J&J -A: Performed by: SPECIALIST

## 2019-05-28 PROCEDURE — 74011250637 HC RX REV CODE- 250/637

## 2019-05-28 PROCEDURE — 77030008684 HC TU ET CUF COVD -B: Performed by: NURSE ANESTHETIST, CERTIFIED REGISTERED

## 2019-05-28 PROCEDURE — 77030018846 HC SOL IRR STRL H20 ICUM -A: Performed by: SPECIALIST

## 2019-05-28 PROCEDURE — 74011250637 HC RX REV CODE- 250/637: Performed by: SPECIALIST

## 2019-05-28 PROCEDURE — 76210000006 HC OR PH I REC 0.5 TO 1 HR: Performed by: SPECIALIST

## 2019-05-28 PROCEDURE — 77030013812 HC MANIP UTER LAPSCP J&J -B: Performed by: SPECIALIST

## 2019-05-28 PROCEDURE — 76060000033 HC ANESTHESIA 1 TO 1.5 HR: Performed by: SPECIALIST

## 2019-05-28 PROCEDURE — 77030011640 HC PAD GRND REM COVD -A: Performed by: SPECIALIST

## 2019-05-28 PROCEDURE — 76210000021 HC REC RM PH II 0.5 TO 1 HR: Performed by: SPECIALIST

## 2019-05-28 PROCEDURE — 77030026438 HC STYL ET INTUB CARD -A: Performed by: NURSE ANESTHETIST, CERTIFIED REGISTERED

## 2019-05-28 PROCEDURE — 77030032490 HC SLV COMPR SCD KNE COVD -B: Performed by: SPECIALIST

## 2019-05-28 PROCEDURE — 81025 URINE PREGNANCY TEST: CPT

## 2019-05-28 PROCEDURE — 77030008520 HC TBNG INSUF HFLO STOR -B: Performed by: SPECIALIST

## 2019-05-28 PROCEDURE — 77030027744 HC PWDR HEMSTAT ARISTA ABSRB 5GM BARD -D: Performed by: SPECIALIST

## 2019-05-28 PROCEDURE — 77030019908 HC STETH ESOPH SIMS -A: Performed by: NURSE ANESTHETIST, CERTIFIED REGISTERED

## 2019-05-28 RX ORDER — ACETAMINOPHEN 500 MG
1000 TABLET ORAL ONCE
Status: COMPLETED | OUTPATIENT
Start: 2019-05-28 | End: 2019-05-28

## 2019-05-28 RX ORDER — SODIUM CHLORIDE 0.9 % (FLUSH) 0.9 %
5-40 SYRINGE (ML) INJECTION AS NEEDED
Status: DISCONTINUED | OUTPATIENT
Start: 2019-05-28 | End: 2019-05-28 | Stop reason: HOSPADM

## 2019-05-28 RX ORDER — HYDROMORPHONE HYDROCHLORIDE 2 MG/ML
INJECTION, SOLUTION INTRAMUSCULAR; INTRAVENOUS; SUBCUTANEOUS AS NEEDED
Status: DISCONTINUED | OUTPATIENT
Start: 2019-05-28 | End: 2019-05-28 | Stop reason: HOSPADM

## 2019-05-28 RX ORDER — OXYCODONE AND ACETAMINOPHEN 5; 325 MG/1; MG/1
1 TABLET ORAL ONCE
Status: CANCELLED | OUTPATIENT
Start: 2019-05-28 | End: 2019-05-29

## 2019-05-28 RX ORDER — DEXAMETHASONE SODIUM PHOSPHATE 4 MG/ML
INJECTION, SOLUTION INTRA-ARTICULAR; INTRALESIONAL; INTRAMUSCULAR; INTRAVENOUS; SOFT TISSUE AS NEEDED
Status: DISCONTINUED | OUTPATIENT
Start: 2019-05-28 | End: 2019-05-28 | Stop reason: HOSPADM

## 2019-05-28 RX ORDER — PROPOFOL 10 MG/ML
INJECTION, EMULSION INTRAVENOUS AS NEEDED
Status: DISCONTINUED | OUTPATIENT
Start: 2019-05-28 | End: 2019-05-28 | Stop reason: HOSPADM

## 2019-05-28 RX ORDER — OXYCODONE AND ACETAMINOPHEN 5; 325 MG/1; MG/1
1 TABLET ORAL ONCE
Status: COMPLETED | OUTPATIENT
Start: 2019-05-28 | End: 2019-05-28

## 2019-05-28 RX ORDER — SODIUM CHLORIDE 0.9 % (FLUSH) 0.9 %
5-40 SYRINGE (ML) INJECTION EVERY 8 HOURS
Status: DISCONTINUED | OUTPATIENT
Start: 2019-05-28 | End: 2019-05-28 | Stop reason: HOSPADM

## 2019-05-28 RX ORDER — IBUPROFEN 200 MG
800 TABLET ORAL
Qty: 30 TAB | Refills: 0 | Status: SHIPPED | OUTPATIENT
Start: 2019-05-28 | End: 2019-09-23

## 2019-05-28 RX ORDER — SUCCINYLCHOLINE CHLORIDE 20 MG/ML
INJECTION INTRAMUSCULAR; INTRAVENOUS AS NEEDED
Status: DISCONTINUED | OUTPATIENT
Start: 2019-05-28 | End: 2019-05-28 | Stop reason: HOSPADM

## 2019-05-28 RX ORDER — LIDOCAINE HYDROCHLORIDE 10 MG/ML
0.1 INJECTION, SOLUTION EPIDURAL; INFILTRATION; INTRACAUDAL; PERINEURAL AS NEEDED
Status: DISCONTINUED | OUTPATIENT
Start: 2019-05-28 | End: 2019-05-28 | Stop reason: HOSPADM

## 2019-05-28 RX ORDER — KETOROLAC TROMETHAMINE 30 MG/ML
INJECTION, SOLUTION INTRAMUSCULAR; INTRAVENOUS AS NEEDED
Status: DISCONTINUED | OUTPATIENT
Start: 2019-05-28 | End: 2019-05-28 | Stop reason: HOSPADM

## 2019-05-28 RX ORDER — MIDAZOLAM HYDROCHLORIDE 1 MG/ML
INJECTION, SOLUTION INTRAMUSCULAR; INTRAVENOUS AS NEEDED
Status: DISCONTINUED | OUTPATIENT
Start: 2019-05-28 | End: 2019-05-28 | Stop reason: HOSPADM

## 2019-05-28 RX ORDER — NEOSTIGMINE METHYLSULFATE 1 MG/ML
INJECTION INTRAVENOUS AS NEEDED
Status: DISCONTINUED | OUTPATIENT
Start: 2019-05-28 | End: 2019-05-28 | Stop reason: HOSPADM

## 2019-05-28 RX ORDER — SODIUM CHLORIDE, SODIUM LACTATE, POTASSIUM CHLORIDE, CALCIUM CHLORIDE 600; 310; 30; 20 MG/100ML; MG/100ML; MG/100ML; MG/100ML
25 INJECTION, SOLUTION INTRAVENOUS CONTINUOUS
Status: CANCELLED | OUTPATIENT
Start: 2019-05-28

## 2019-05-28 RX ORDER — HYDROMORPHONE HYDROCHLORIDE 1 MG/ML
0.2 INJECTION, SOLUTION INTRAMUSCULAR; INTRAVENOUS; SUBCUTANEOUS
Status: CANCELLED | OUTPATIENT
Start: 2019-05-28

## 2019-05-28 RX ORDER — FENTANYL CITRATE 50 UG/ML
25 INJECTION, SOLUTION INTRAMUSCULAR; INTRAVENOUS
Status: CANCELLED | OUTPATIENT
Start: 2019-05-28

## 2019-05-28 RX ORDER — SODIUM CHLORIDE 0.9 % (FLUSH) 0.9 %
5-40 SYRINGE (ML) INJECTION AS NEEDED
Status: CANCELLED | OUTPATIENT
Start: 2019-05-28

## 2019-05-28 RX ORDER — ROCURONIUM BROMIDE 10 MG/ML
INJECTION, SOLUTION INTRAVENOUS AS NEEDED
Status: DISCONTINUED | OUTPATIENT
Start: 2019-05-28 | End: 2019-05-28 | Stop reason: HOSPADM

## 2019-05-28 RX ORDER — ALBUTEROL SULFATE 90 UG/1
AEROSOL, METERED RESPIRATORY (INHALATION) AS NEEDED
Status: DISCONTINUED | OUTPATIENT
Start: 2019-05-28 | End: 2019-05-28 | Stop reason: HOSPADM

## 2019-05-28 RX ORDER — SODIUM CHLORIDE 0.9 % (FLUSH) 0.9 %
5-40 SYRINGE (ML) INJECTION EVERY 8 HOURS
Status: CANCELLED | OUTPATIENT
Start: 2019-05-28

## 2019-05-28 RX ORDER — SODIUM CHLORIDE, SODIUM LACTATE, POTASSIUM CHLORIDE, CALCIUM CHLORIDE 600; 310; 30; 20 MG/100ML; MG/100ML; MG/100ML; MG/100ML
25 INJECTION, SOLUTION INTRAVENOUS CONTINUOUS
Status: DISCONTINUED | OUTPATIENT
Start: 2019-05-28 | End: 2019-05-28 | Stop reason: HOSPADM

## 2019-05-28 RX ORDER — CEFAZOLIN SODIUM/WATER 2 G/20 ML
2 SYRINGE (ML) INTRAVENOUS ONCE
Status: COMPLETED | OUTPATIENT
Start: 2019-05-28 | End: 2019-05-28

## 2019-05-28 RX ORDER — FENTANYL CITRATE 50 UG/ML
INJECTION, SOLUTION INTRAMUSCULAR; INTRAVENOUS AS NEEDED
Status: DISCONTINUED | OUTPATIENT
Start: 2019-05-28 | End: 2019-05-28 | Stop reason: HOSPADM

## 2019-05-28 RX ORDER — ONDANSETRON 2 MG/ML
INJECTION INTRAMUSCULAR; INTRAVENOUS AS NEEDED
Status: DISCONTINUED | OUTPATIENT
Start: 2019-05-28 | End: 2019-05-28 | Stop reason: HOSPADM

## 2019-05-28 RX ORDER — GLYCOPYRROLATE 0.2 MG/ML
INJECTION INTRAMUSCULAR; INTRAVENOUS AS NEEDED
Status: DISCONTINUED | OUTPATIENT
Start: 2019-05-28 | End: 2019-05-28 | Stop reason: HOSPADM

## 2019-05-28 RX ORDER — DIPHENHYDRAMINE HYDROCHLORIDE 50 MG/ML
12.5 INJECTION, SOLUTION INTRAMUSCULAR; INTRAVENOUS AS NEEDED
Status: CANCELLED | OUTPATIENT
Start: 2019-05-28 | End: 2019-05-28

## 2019-05-28 RX ORDER — LIDOCAINE HYDROCHLORIDE 20 MG/ML
INJECTION, SOLUTION EPIDURAL; INFILTRATION; INTRACAUDAL; PERINEURAL AS NEEDED
Status: DISCONTINUED | OUTPATIENT
Start: 2019-05-28 | End: 2019-05-28 | Stop reason: HOSPADM

## 2019-05-28 RX ADMIN — LIDOCAINE HYDROCHLORIDE 80 MG: 20 INJECTION, SOLUTION EPIDURAL; INFILTRATION; INTRACAUDAL; PERINEURAL at 13:12

## 2019-05-28 RX ADMIN — OXYCODONE HYDROCHLORIDE AND ACETAMINOPHEN 1 TABLET: 5; 325 TABLET ORAL at 15:52

## 2019-05-28 RX ADMIN — ROCURONIUM BROMIDE 35 MG: 10 INJECTION, SOLUTION INTRAVENOUS at 13:18

## 2019-05-28 RX ADMIN — FENTANYL CITRATE 50 MCG: 50 INJECTION, SOLUTION INTRAMUSCULAR; INTRAVENOUS at 13:25

## 2019-05-28 RX ADMIN — FENTANYL CITRATE 50 MCG: 50 INJECTION, SOLUTION INTRAMUSCULAR; INTRAVENOUS at 13:12

## 2019-05-28 RX ADMIN — Medication 2 G: at 13:19

## 2019-05-28 RX ADMIN — ALBUTEROL SULFATE 3 PUFF: 90 AEROSOL, METERED RESPIRATORY (INHALATION) at 13:15

## 2019-05-28 RX ADMIN — NEOSTIGMINE METHYLSULFATE 4 MG: 1 INJECTION INTRAVENOUS at 13:58

## 2019-05-28 RX ADMIN — MIDAZOLAM HYDROCHLORIDE 2 MG: 1 INJECTION, SOLUTION INTRAMUSCULAR; INTRAVENOUS at 13:07

## 2019-05-28 RX ADMIN — HYDROMORPHONE HYDROCHLORIDE 1 MG: 2 INJECTION, SOLUTION INTRAMUSCULAR; INTRAVENOUS; SUBCUTANEOUS at 14:14

## 2019-05-28 RX ADMIN — ALBUTEROL SULFATE 3 PUFF: 90 AEROSOL, METERED RESPIRATORY (INHALATION) at 13:53

## 2019-05-28 RX ADMIN — ROCURONIUM BROMIDE 5 MG: 10 INJECTION, SOLUTION INTRAVENOUS at 13:12

## 2019-05-28 RX ADMIN — HYDROMORPHONE HYDROCHLORIDE 0.5 MG: 2 INJECTION, SOLUTION INTRAMUSCULAR; INTRAVENOUS; SUBCUTANEOUS at 14:09

## 2019-05-28 RX ADMIN — DEXAMETHASONE SODIUM PHOSPHATE 8 MG: 4 INJECTION, SOLUTION INTRA-ARTICULAR; INTRALESIONAL; INTRAMUSCULAR; INTRAVENOUS; SOFT TISSUE at 13:36

## 2019-05-28 RX ADMIN — SUCCINYLCHOLINE CHLORIDE 140 MG: 20 INJECTION INTRAMUSCULAR; INTRAVENOUS at 13:13

## 2019-05-28 RX ADMIN — KETOROLAC TROMETHAMINE 30 MG: 30 INJECTION, SOLUTION INTRAMUSCULAR; INTRAVENOUS at 13:54

## 2019-05-28 RX ADMIN — ONDANSETRON 4 MG: 2 INJECTION INTRAMUSCULAR; INTRAVENOUS at 13:49

## 2019-05-28 RX ADMIN — ACETAMINOPHEN 1000 MG: 500 TABLET ORAL at 10:54

## 2019-05-28 RX ADMIN — SODIUM CHLORIDE, SODIUM LACTATE, POTASSIUM CHLORIDE, AND CALCIUM CHLORIDE 25 ML/HR: 600; 310; 30; 20 INJECTION, SOLUTION INTRAVENOUS at 11:21

## 2019-05-28 RX ADMIN — SODIUM CHLORIDE, SODIUM LACTATE, POTASSIUM CHLORIDE, AND CALCIUM CHLORIDE: 600; 310; 30; 20 INJECTION, SOLUTION INTRAVENOUS at 13:57

## 2019-05-28 RX ADMIN — GLYCOPYRROLATE 0.6 MG: 0.2 INJECTION INTRAMUSCULAR; INTRAVENOUS at 13:58

## 2019-05-28 RX ADMIN — PROPOFOL 150 MG: 10 INJECTION, EMULSION INTRAVENOUS at 13:12

## 2019-05-28 RX ADMIN — HYDROMORPHONE HYDROCHLORIDE 0.5 MG: 2 INJECTION, SOLUTION INTRAMUSCULAR; INTRAVENOUS; SUBCUTANEOUS at 13:35

## 2019-05-28 NOTE — ANESTHESIA PREPROCEDURE EVALUATION
Relevant Problems   No relevant active problems       Anesthetic History   No history of anesthetic complications            Review of Systems / Medical History  Patient summary reviewed, nursing notes reviewed and pertinent labs reviewed    Pulmonary          Smoker      Comments: Smoker - 1 ppd x 15 years   Neuro/Psych         Psychiatric history    Comments: Depression  H/O Suicide Attempt Cardiovascular  Within defined limits                Exercise tolerance: >4 METS     GI/Hepatic/Renal  Within defined limits              Endo/Other  Within defined limits           Other Findings   Comments: Pelvic Pain   Ovarian Cyst           Physical Exam    Airway  Mallampati: II  TM Distance: > 6 cm  Neck ROM: normal range of motion   Mouth opening: Normal     Cardiovascular  Regular rate and rhythm,  S1 and S2 normal,  no murmur, click, rub, or gallop             Dental  No notable dental hx       Pulmonary                 Abdominal         Other Findings            Anesthetic Plan    ASA: 2  Anesthesia type: general          Induction: Intravenous  Anesthetic plan and risks discussed with: Patient

## 2019-05-28 NOTE — DISCHARGE INSTRUCTIONS
Please call Dr. Alysha Diaz office tomorrow to make a one week appointment              How to Care for Your Wound After Its Treated With  DERMABOND* Topical Skin Adhesive  DERMABOND* Topical Skin Adhesive (2-octyl cyanoacrylate) is a sterile, liquid skin adhesive  that holds wound edges together. The film will usually remain in place for 5 to 10 days, then  naturally fall off your skin. The following will answer some of your questions and provide instructions for proper care for your  wound while it is healing:    CHECK WOUND APPEARANCE   Some swelling, redness, and pain are common with all wounds and normally will go away as the  wound heals. If swelling, redness, or pain increases or if the wound feels warm to the touch,  contact a doctor. Also contact a doctor if the wound edges reopen or separate. REPLACE BANDAGES   If your wound is bandaged, keep the bandage dry.  Replace the dressing daily until the adhesive film has fallen off or if the  bandage should become wet, unless otherwise instructed by your  physician.  When changing the dressing, do not place tape directly over the  DERMABOND adhesive film, because removing the tape later may also  remove the film. AVOID TOPICAL MEDICATIONS   Do not apply liquid or ointment medications or any other product to your wound while the  DERMABOND adhesive film is in place. These may loosen the film before your wound is healed. KEEP WOUND DRY AND PROTECTED   You may occasionally and briefly wet your wound in the shower or bath. Do not soak or scrub  your wound, do not swim, and avoid periods of heavy perspiration until the DERMABOND  adhesive has naturally fallen off. After showering or bathing, gently blot your wound dry with a  soft towel. If a protective dressing is being used, apply a fresh, dry bandage, being sure to keep  the tape off the DERMABOND adhesive film.  Apply a clean, dry bandage over the wound if necessary to protect it.    Protect your wound from injury until the skin has had sufficient time to heal.   Do not scratch, rub, or pick at the DERMABOND adhesive film. This may loosen the film before  your wound is healed.  Protect the wound from prolonged exposure to sunlight or tanning lamps while the film is in  place. If you have any questions or concerns about this product, please consult your doctor. *Trademark ©[employer] 2002    A common side effect of anesthesia following surgery is nausea and/or vomiting. In order to decrease symptoms, it is wise to avoid foods that are high in fat, greasy foods, milk products, and spicy foods for the first 24 hours. Acceptable foods for the first 24 hours following surgery include but are not limited to:     soup   broth    toast    crackers    applesauce    bananas    mashed potatoes,   soft or scrambled eggs   oatmeal    jello    It is important to eat when taking your pain medication. This will help to prevent nausea. If possible, please try to time your meals with your medications. It is very important to stay hydrated following surgery. Sip fluids frequently while awake. Avoid acidic drinks such as citrus juices and soda for 24 hours. Carbonated beverages may cause bloating and gas. Acceptable fluids include:    - water (flavor packets may add variety)  - coffee or tea (in moderation)  - Gatorade  - Hossein-aid  - apple juice  - cranberry juice    You are encouraged to cough and deep breathe every hour when awake. This will help to prevent respiratory complications following anesthesia. You may want to hug a pillow when coughing and sneezing to add additional support to the surgical area and to decrease discomfort if you had abdominal or chest surgery. If you are discharged home with support stockings, you may remove them after 24 hours. Support stockings are used to help prevent blood clots in the legs following surgery.     Please take time to review all of your Home Care Instructions and Medication Information sheets provided in your discharge packet. If you have any questions, please contact your surgeons office. Thank you. DISCHARGE SUMMARY from Nurse    PATIENT INSTRUCTIONS:    After general anesthesia or intravenous sedation, for 24 hours or while taking prescription Narcotics:  · Limit your activities  · Do not drive and operate hazardous machinery  · Do not make important personal or business decisions  · Do  not drink alcoholic beverages  · If you have not urinated within 8 hours after discharge, please contact your surgeon on call. Report the following to your surgeon:  · Excessive pain, swelling, redness or odor of or around the surgical area  · Temperature over 100.5  · Nausea and vomiting lasting longer than 4 hours or if unable to take medications  · Any signs of decreased circulation or nerve impairment to extremity: change in color, persistent  numbness, tingling, coldness or increase pain  · Any questions    What to do at Home:  *  Please give a list of your current medications to your Primary Care Provider. *  Please update this list whenever your medications are discontinued, doses are      changed, or new medications (including over-the-counter products) are added. *  Please carry medication information at all times in case of emergency situations. These are general instructions for a healthy lifestyle:    No smoking/ No tobacco products/ Avoid exposure to second hand smoke  Surgeon General's Warning:  Quitting smoking now greatly reduces serious risk to your health.     Obesity, smoking, and sedentary lifestyle greatly increases your risk for illness    A healthy diet, regular physical exercise & weight monitoring are important for maintaining a healthy lifestyle    You may be retaining fluid if you have a history of heart failure or if you experience any of the following symptoms:  Weight gain of 3 pounds or more overnight or 5 pounds in a week, increased swelling in our hands or feet or shortness of breath while lying flat in bed. Please call your doctor as soon as you notice any of these symptoms; do not wait until your next office visit. Recognize signs and symptoms of STROKE:    F-face looks uneven    A-arms unable to move or move unevenly    S-speech slurred or non-existent    T-time-call 911 as soon as signs and symptoms begin-DO NOT go       Back to bed or wait to see if you get better-TIME IS BRAIN. Warning Signs of HEART ATTACK     Call 911 if you have these symptoms:   Chest discomfort. Most heart attacks involve discomfort in the center of the chest that lasts more than a few minutes, or that goes away and comes back. It can feel like uncomfortable pressure, squeezing, fullness, or pain.  Discomfort in other areas of the upper body. Symptoms can include pain or discomfort in one or both arms, the back, neck, jaw, or stomach.  Shortness of breath with or without chest discomfort.  Other signs may include breaking out in a cold sweat, nausea, or lightheadedness. Don't wait more than five minutes to call 911 - MINUTES MATTER! Fast action can save your life. Calling 911 is almost always the fastest way to get lifesaving treatment. Emergency Medical Services staff can begin treatment when they arrive -- up to an hour sooner than if someone gets to the hospital by car. The discharge information has been reviewed with the patient. The patient verbalized understanding. Discharge medications reviewed with the patient and appropriate educational materials and side effects teaching were provided. ___________________________________________________________________________________________________________________________________      Patient Education        Oophorectomy: What to Expect at Home  Your Recovery    After surgery to remove one or both ovaries, you can expect to feel better and stronger each day, although you may need pain medicine for a week or two. You may get tired easily or have less energy than usual. This may last for several weeks after surgery. You will probably notice that your belly is swollen and puffy. This is common. The swelling will take several weeks to go down. You may take 4 to 6 weeks to fully recover. It is important to avoid lifting while you are recovering so that you can heal.  This care sheet gives you a general idea about how long it will take for you to recover. But each person recovers at a different pace. Follow the steps below to get better as quickly as possible. How can you care for yourself at home? Activity    · Rest when you feel tired. Getting enough sleep will help you recover.     · Try to walk each day. Start by walking a little more than you did the day before. Bit by bit, increase the amount you walk. Walking boosts blood flow and helps prevent pneumonia and constipation.     · Avoid strenuous activities, such as bicycle riding, jogging, weight lifting, or aerobic exercise, until your doctor says it is okay.     · Avoid lifting anything that would make you strain. This may include heavy grocery bags and milk containers, a heavy briefcase or backpack, cat litter or dog food bags, a vacuum , or a child.     · Hold a pillow over your incisions when you cough or take deep breaths. This will support your belly and decrease your pain. Do breathing exercises at home as instructed by your doctor. This will help prevent pneumonia.     · You will probably need to take 2 to 4 weeks off from work. It depends on the type of work you do and how you feel.     · Your doctor will tell you when you can have sex again. Diet    · You can eat your normal diet. If your stomach is upset, try bland, low-fat foods like plain rice, broiled chicken, toast, and yogurt.     · Drink plenty of fluids (unless your doctor tells you not to).     · You may notice that your bowel movements are not regular right after your surgery. This is common. Try to avoid constipation and straining with bowel movements. You may want to take a fiber supplement every day. If you have not had a bowel movement after a couple of days, ask your doctor about taking a mild laxative. Medicines    · Your doctor will tell you if and when you can restart your medicines. He or she will also give you instructions about taking any new medicines.     · If you take blood thinners, such as warfarin (Coumadin), clopidogrel (Plavix), or aspirin, be sure to talk to your doctor. He or she will tell you if and when to start taking those medicines again. Make sure that you understand exactly what your doctor wants you to do.     · Take pain medicines exactly as directed. ? If the doctor gave you a prescription medicine for pain, take it as prescribed. ? If you are not taking a prescription pain medicine, take an over-the-counter medicine such as acetaminophen (Tylenol), ibuprofen (Advil, Motrin), or naproxen (Aleve). Read and follow all instructions on the label. ? Do not take two or more pain medicines at the same time unless the doctor told you to. Many pain medicines contain acetaminophen, which is Tylenol. Too much acetaminophen (Tylenol) can be harmful.     · If you think your pain medicine is making you sick to your stomach:  ? Take your medicine after meals (unless your doctor tells you not to). ? Ask your doctor for a different pain medicine. Incision care    · If you have strips of tape on the cut (incision) the doctor made, leave the tape on for a week or until it falls off. Or follow your doctor's instructions for removing the tape.     · Wash the area daily with warm, soapy water, and pat it dry. Don't use hydrogen peroxide or alcohol, which can slow healing. You may cover the area with a gauze bandage if it weeps or rubs against clothing. Change the bandage every day.     · Keep the area clean and dry.    Other instructions    · Wear loose, comfortable clothing and avoid anything that puts pressure on your belly, such as a girdle, for a few weeks.     · You may want to use a heating pad on your belly to help with pain. Follow-up care is a key part of your treatment and safety. Be sure to make and go to all appointments, and call your doctor if you are having problems. It's also a good idea to know your test results and keep a list of the medicines you take. When should you call for help? Call 911 anytime you think you may need emergency care. For example, call if:    · You passed out (lost consciousness).     · You have chest pain, are short of breath, or cough up blood.    Call your doctor now or seek immediate medical care if:    · You have pain that does not get better after you take pain medicine.     · You cannot pass stools or gas.     · You have vaginal discharge that has increased in amount or smells bad.     · You are sick to your stomach or cannot drink fluids.     · You have loose stitches, or your incision comes open.     · Bright red blood has soaked through the bandage over your incision.     · You have signs of infection, such as:  ? Increased pain, swelling, warmth, or redness. ? Red streaks leading from the incision. ? Pus draining from the incision. ? A fever.     · You have bright red vaginal bleeding that soaks one or more pads in an hour, or you have large clots.     · You have signs of a blood clot in your leg (called a deep vein thrombosis), such as:  ? Pain in your calf, back of the knee, thigh, or groin. ? Redness and swelling in your leg.    Watch closely for changes in your health, and be sure to contact your doctor if you have any problems. Where can you learn more? Go to http://shawn-lachelle.info/. Enter [phone] in the search box to learn more about \"Oophorectomy: What to Expect at Home. \"  Current as of: May 14, 2018  Content Version: 11.9  © 9448-1039 [employer].  Care instructions adapted under license by [address] (which disclaims liability or warranty for this information). If you have questions about a medical condition or this instruction, always ask your healthcare professional. [address] any warranty or liability for your use of this information.

## 2019-05-28 NOTE — PROGRESS NOTES
Patient discharged to home. Iv removed. Discharge instructions, script, and medication education done with patient and significant other.

## 2019-05-28 NOTE — PERIOP NOTES
Patient ask about having boyfriend back to room to sit with her since she was delayed, made aware that we have to wait until 1:30 pm to see if she goes back before that, if not we will get him back at 1:30 pm.

## 2019-05-28 NOTE — H&P
Gynecology History and Physical    Name: Kae Maldonado MRN: [id number] SSN: [id number]    YOB: 1980  Age: 45 y.o. Sex: female       Subjective:      Chief complaint:  Ovarian cyst - recurrent    [facility] is a 45 y.o.  female with a history of pelvic pain. Previous workup included Ultrasound which revealed a bilateral ovarian cyst. Previous treatment measures included observation and oral contraceptives. She is admitted for Procedure(s) (LRB):  ROBOTIC DIAGNOSTIC LAPAROSCOPY POSSIBLE OVARIAN CYSTECTOMY POSSIBLE OOPHORECTOMY (N/A)  ROBOTIC DIAGNOSTIC LAPAROSCOPY POSSIBLE OVARIAN CYSTECTOMY POSSIBLE OOPHORECTOMY (N/A). The current method of family planning is none. OB History    None       Past Medical History:   Diagnosis Date    Other ill-defined conditions(308.95)     ovarian cyst     Psychiatric disorder     suicide attempt, depression      Past Surgical History:   Procedure Laterality Date    HX APPENDECTOMY      1999    HX GYN      BTL    HX GYN      tubes tied 10/2008     Social History     Occupational History    Not on file   Tobacco Use    Smoking status: Current Every Day Smoker     Packs/day: 1.00     Years: 15.00     Pack years: 15.00     Types: Cigarettes    Smokeless tobacco: Never Used   Substance and Sexual Activity    Alcohol use:  Yes     Alcohol/week: 0.6 oz     Types: 1 Cans of beer per week     Comment: occasional-weekends    Drug use: Never    Sexual activity: Yes     Birth control/protection: Surgical     Comment:       Family History   Problem Relation Age of Onset    Heart Attack Mother     Hypertension Father     No Known Problems Sister     Cancer Maternal Aunt     No Known Problems Maternal Uncle     No Known Problems Paternal Aunt     Cancer Paternal Uncle         testicular cancer    Cancer Maternal Grandmother         lung/leukemia    Diabetes Maternal Grandfather     No Known Problems Paternal Grandmother     Stroke Paternal Grandfather     Asthma Son         Allergies   Allergen Reactions    Beef Containing Products Other (comments)     Gi distress     Codeine Hives     Prior to Admission medications    Medication Sig Start Date End Date Taking? Authorizing Provider   oxyCODONE-acetaminophen (PERCOCET) 5-325 mg per tablet Take 1 Tab by mouth every four (4) hours as needed for Pain. Yes Provider, Historical   ibuprofen (MOTRIN IB) 200 mg tablet Take 800 mg by mouth every eight (8) hours as needed for Pain. Indications: Pain   Yes Provider, Historical        Review of Systems:  A comprehensive review of systems was negative except for that written in the History of Present Illness. Objective:     Vitals:    05/24/19 1547 05/28/19 1040   BP:  144/84   Pulse:  84   Resp:  14   Temp:  97.9 °F (36.6 °C)   SpO2:  95%   Weight: 76.7 kg (169 lb) 76.3 kg (168 lb 3.4 oz)   Height: 5' 7\" (1.702 m) 5' 7\" (1.702 m)       Physical Exam:  Patient without distress. Heart: Regular rate and rhythm or S1S2 present  Lung: clear to auscultation throughout lung fields, no wheezes, no rales, no rhonchi and normal respiratory effort  Abdomen: soft, nontender    Assessment:     Active Problems:    * No active hospital problems. *     Pelvic pain with ovarian cyst - recurrent    Plan:     Procedure(s) (LRB):  ROBOTIC DIAGNOSTIC LAPAROSCOPY POSSIBLE OVARIAN CYSTECTOMY POSSIBLE OOPHORECTOMY (N/A)  ROBOTIC DIAGNOSTIC LAPAROSCOPY POSSIBLE OVARIAN CYSTECTOMY POSSIBLE OOPHORECTOMY (N/A)  Discussed the risks of surgery including the risks of bleeding, infection, deep vein thrombosis, and surgical injuries to internal organs including but not limited to the bowels, bladder, rectum, and female reproductive organs. The patient understands the risks; any and all questions were answered to the patient's satisfaction.

## 2019-05-28 NOTE — ANESTHESIA POSTPROCEDURE EVALUATION
Procedure(s):  ROBOTIC DIAGNOSTIC LAPAROSCOPY LEFT OVARIAN CYSTECTOMY; BILATERAL PARTIAL OOPHORECTOMY. general    Anesthesia Post Evaluation        Patient location during evaluation: PACU  Note status: Adequate. Level of consciousness: responsive to verbal stimuli and sleepy but conscious  Pain management: satisfactory to patient  Airway patency: patent  Anesthetic complications: no  Cardiovascular status: acceptable  Respiratory status: acceptable  Hydration status: acceptable  Comments: +Post-Anesthesia Evaluation and Assessment    Patient: Abby Mccann MRN: [id number]  SSN: [id number]   YOB: 1980  Age: 45 y.o. Sex: female      Cardiovascular Function/Vital Signs    /71 (BP 1 Location: Right arm, BP Patient Position: At rest)   Pulse 68   Temp 36.4 °C (97.5 °F)   Resp 19   Ht 5' 7\" (1.702 m)   Wt 76.3 kg (168 lb 3.4 oz)   SpO2 95%   BMI 26.35 kg/m²     Patient is status post Procedure(s):  ROBOTIC DIAGNOSTIC LAPAROSCOPY LEFT OVARIAN CYSTECTOMY; BILATERAL PARTIAL OOPHORECTOMY. Nausea/Vomiting: Controlled. Postoperative hydration reviewed and adequate. Pain:  Pain Scale 1: FLACC (05/28/19 1445)  Pain Intensity 1: 0 (05/28/19 1445)   Managed. Neurological Status:   Neuro (WDL): Exceptions to WDL (05/28/19 1410)   At baseline. Mental Status and Level of Consciousness: Arousable. Pulmonary Status:   O2 Device: Room air (05/28/19 1445)   Adequate oxygenation and airway patent. Complications related to anesthesia: None    Post-anesthesia assessment completed. No concerns. Signed By: Yakelin Preston MD    5/28/2019  Post anesthesia nausea and vomiting:  controlled      Vitals Value Taken Time   /71 5/28/2019  2:45 PM   Temp 36.4 °C (97.5 °F) 5/28/2019  2:13 PM   Pulse 75 5/28/2019  2:50 PM   Resp 18 5/28/2019  2:50 PM   SpO2 89 % 5/28/2019  2:50 PM   Vitals shown include unvalidated device data.

## 2019-05-28 NOTE — PROGRESS NOTES
Handoff Report from Operating Room to PACU    Report received from CHRIS Campos RN and KASH Ryder CRNA regarding Paxton Echevarria. Surgeon(s):  Pernell Hernandez MD  And Procedure(s) (LRB):  ROBOTIC DIAGNOSTIC LAPAROSCOPY LEFT OVARIAN CYSTECTOMY; BILATERAL PARTIAL OOPHORECTOMY (N/A)  confirmed   with allergies, drains and dressings discussed. Anesthesia type, drugs, patient history, complications, estimated blood loss, vital signs, intake and output, and last pain medication, lines, reversal medications and temperature were reviewed.

## 2019-05-29 NOTE — OP NOTES
[address]  OPERATIVE REPORT    Name:  Royce Wilson  MR#:  [id number]  :  1980  ACCOUNT #:  [de-identified]  DATE OF SERVICE:  2019      PREOPERATIVE DIAGNOSIS:  Pelvic pain, ovarian cyst.    POSTOPERATIVE DIAGNOSIS:  Pelvic pain, ovarian cyst.    PROCEDURES PERFORMED:  Da Cristina diagnostic laparoscopic left ovarian cystectomy, bilateral partial salpingectomy. SURGEON:  Delphine Arita MD    ASSISTANT:       ANESTHESIA:  General.    COMPLICATIONS:       SPECIMENS REMOVED:       IMPLANTS:       ESTIMATED BLOOD LOSS:  Minimal.    DRAINS:  Villanueva catheter, clear urine. PATHOLOGY:  Bilateral fallopian tube segments, left ovarian cyst.    FINDINGS:  Cystic bilateral fallopian tubes, Filshie clips applied; hemorrhagic left ovarian cyst, 2 x 3 cm. DESCRIPTION OF OPERATIVE PROCEDURE:  After extensive counseling of risks and benefits of the procedure, complication rates of the procedure, alternatives and consent being signed, she was taken to the operating room. After adequate anesthesia, she was placed in the dorsal lithotomy position, prepped and draped in the usual sterile manner for the surgical procedure. A Villanueva catheter in place, clear urine noted. Sterile Graves speculum was inserted. Anterior lip of the cervix was gently dilated to accept a ClearView 7-cm uterine manipulator. Single tooth tenaculum and Graves speculum were removed. Attention was turned to the abdomen where a Veress needle was placed infraumbilically and confirmed with a water drop test.  Insufflation of CO2 up to 15 mmHg was then performed without complication. A #11 scalpel blade was then used to make an infraumbilical incision and bladeless 8-mm trocar and sleeve was inserted infraumbilically with above noted findings; 10 cm left of the umbilical port side, an 8-mm bladeless trocar and sleeve was inserted as well as one in the right lower quadrant port site.   The Da Cristina Intuitive robot was then docked under the proper docking technique, fenestrated bipolar in the left hand and hot cony in the right hand. Under careful inspection of the pelvis, adhesiolysis of small bowel adhesions on the left side wall was performed. Bilateral partial salpingectomy was performed to cystic bilateral fallopian tubes, partial left ovarian cystectomy was performed without complication with the use of hot cony and fenestrated bipolar. Upon completion, no active bleeding noted. Ovarian cyst was placed in an Endopouch in the right lower quadrant port site, removed. Bilateral tubal segments removed through the right lower quadrant port site upon completion, no active bleeding noted. Brianna was sprayed over the adhesiolysis segment as well as cystectomy site. The Da Cristina Intuitive robot was then undocked under the proper undocking technique. The 8-mm sites were closed with 3-0 Vicryl interrupted x1. Dermabond was used on the skin. A 0.25% Marcaine without was used in subcu. The patient tolerated the procedure well. Needle, sponge and instrument count correct x2 at the end of the procedure. She was awoken in the operating room and taken to the recovery in stable condition.       Collin Penny MD      [id number]  D:  05/28/2019 14:02  T:  05/28/2019 17:22  JOB #:  [id number]

## 2019-07-08 ENCOUNTER — HOSPITAL ENCOUNTER (OUTPATIENT)
Dept: MAMMOGRAPHY | Age: 39
Discharge: HOME | End: 2019-07-08
Attending: SPECIALIST
Payer: MEDICAID

## 2019-07-08 ENCOUNTER — HOSPITAL ENCOUNTER (OUTPATIENT)
Dept: ULTRASOUND IMAGING | Age: 39
Discharge: HOME | End: 2019-07-08
Attending: SPECIALIST
Payer: MEDICAID

## 2019-07-08 DIAGNOSIS — N63.10 BREAST MASS, RIGHT: ICD-10-CM

## 2019-07-08 PROCEDURE — 76642 ULTRASOUND BREAST LIMITED: CPT

## 2019-07-08 PROCEDURE — 77066 DX MAMMO INCL CAD BI: CPT

## 2019-08-18 ENCOUNTER — HOSPITAL ENCOUNTER (EMERGENCY)
Age: 39
Discharge: HOME | End: 2019-08-18
Attending: EMERGENCY MEDICINE
Payer: MEDICAID

## 2019-08-18 ENCOUNTER — APPOINTMENT (OUTPATIENT)
Dept: CT IMAGING | Age: 39
End: 2019-08-18
Attending: EMERGENCY MEDICINE
Payer: MEDICAID

## 2019-08-18 VITALS
HEART RATE: 100 BPM | RESPIRATION RATE: 18 BRPM | WEIGHT: 164.24 LBS | HEIGHT: 67 IN | OXYGEN SATURATION: 96 % | TEMPERATURE: 97.9 F | DIASTOLIC BLOOD PRESSURE: 83 MMHG | BODY MASS INDEX: 25.78 KG/M2 | SYSTOLIC BLOOD PRESSURE: 131 MMHG

## 2019-08-18 DIAGNOSIS — F10.90 ACUTE ALCOHOL USE: ICD-10-CM

## 2019-08-18 DIAGNOSIS — S01.81XA FACIAL LACERATION, INITIAL ENCOUNTER: Primary | ICD-10-CM

## 2019-08-18 DIAGNOSIS — S09.90XA CLOSED HEAD INJURY, INITIAL ENCOUNTER: ICD-10-CM

## 2019-08-18 PROCEDURE — 74011000250 HC RX REV CODE- 250: Performed by: EMERGENCY MEDICINE

## 2019-08-18 PROCEDURE — 99282 EMERGENCY DEPT VISIT SF MDM: CPT

## 2019-08-18 PROCEDURE — 90471 IMMUNIZATION ADMIN: CPT

## 2019-08-18 PROCEDURE — 74011250636 HC RX REV CODE- 250/636: Performed by: EMERGENCY MEDICINE

## 2019-08-18 PROCEDURE — 96372 THER/PROPH/DIAG INJ SC/IM: CPT

## 2019-08-18 PROCEDURE — 75810000294 HC INTERM/LAYERED WND RPR

## 2019-08-18 PROCEDURE — 99283 EMERGENCY DEPT VISIT LOW MDM: CPT

## 2019-08-18 PROCEDURE — 90715 TDAP VACCINE 7 YRS/> IM: CPT | Performed by: EMERGENCY MEDICINE

## 2019-08-18 RX ADMIN — LIDOCAINE HYDROCHLORIDE 15 MG: 10; .005 INJECTION, SOLUTION EPIDURAL; INFILTRATION; INTRACAUDAL; PERINEURAL at 03:37

## 2019-08-18 NOTE — ED NOTES
Patient refusing tests per PCT. This RN to bedside to discuss rational for tests and explained that a head CT for a head injury while intoxicated is highly indicated. Patient continues to refuse tests. Dr. Geoff Roper notified.

## 2019-08-18 NOTE — ED NOTES
Patient discharged by Dr. Maria Del Carmen Garcia. Patient ambulated with steady gait in NAD accompanied by friends on departure.

## 2019-08-18 NOTE — ED NOTES
Dr. Jun Doty notified that patient had tetanus vaccination in the last 3 months. Order discontinued.

## 2019-08-18 NOTE — DISCHARGE INSTRUCTIONS
Patient Education        Cuts: Care Instructions  Your Care Instructions  A cut can happen anywhere on your body. Stitches, staples, skin adhesives, or pieces of tape called Steri-Strips are sometimes used to keep the edges of a cut together and help it heal. Steri-Strips can be used by themselves or with stitches or staples. Sometimes cuts are left open. If the cut went deep and through the skin, the doctor may have closed the cut in two layers. A deeper layer of stitches brings the deep part of the cut together. These stitches will dissolve and don't need to be removed. The upper layer closure, which could be stitches, staples, Steri-Strips, or adhesive, is what you see on the cut. A cut is often covered by a bandage. The doctor has checked you carefully, but problems can develop later. If you notice any problems or new symptoms, get medical treatment right away. Follow-up care is a key part of your treatment and safety. Be sure to make and go to all appointments, and call your doctor if you are having problems. It's also a good idea to know your test results and keep a list of the medicines you take. How can you care for yourself at home? If a cut is open or closed  · Prop up the sore area on a pillow anytime you sit or lie down during the next 3 days. Try to keep it above the level of your heart. This will help reduce swelling. · Keep the cut dry for the first 24 to 48 hours. After this, you can shower if your doctor okays it. Pat the cut dry. · Don't soak the cut, such as in a bathtub. Your doctor will tell you when it's safe to get the cut wet. · After the first 24 to 48 hours, clean the cut with soap and water 2 times a day unless your doctor gives you different instructions. ? Don't use hydrogen peroxide or alcohol, which can slow healing. ? You may cover the cut with a thin layer of petroleum jelly and a nonstick bandage.   ? If the doctor put a bandage over the cut, put on a new bandage after cleaning the cut or if the bandage gets wet or dirty. · Avoid any activity that could cause your cut to reopen. · Be safe with medicines. Read and follow all instructions on the label. ? If the doctor gave you a prescription medicine for pain, take it as prescribed. ? If you are not taking a prescription pain medicine, ask your doctor if you can take an over-the-counter medicine. If the cut is closed with stitches, staples, or Steri-Strips  · Follow the above instructions for open or closed cuts. · Do not remove the stitches or staples on your own. Your doctor will tell you when to come back to have the stitches or staples removed. · Leave Steri-Strips on until they fall off. If the cut is closed with a skin adhesive  · Follow the above instructions for open or closed cuts. · Leave the skin adhesive on your skin until it falls off on its own. This may take 5 to 10 days. · Do not scratch, rub, or pick at the adhesive. · Do not put the sticky part of a bandage directly on the adhesive. · Do not put any kind of ointment, cream, or lotion over the area. This can make the adhesive fall off too soon. Do not use hydrogen peroxide or alcohol, which can slow healing. When should you call for help? Call your doctor now or seek immediate medical care if:    · You have new pain, or your pain gets worse.     · The skin near the cut is cold or pale or changes color.     · You have tingling, weakness, or numbness near the cut.     · The cut starts to bleed, and blood soaks through the bandage. Oozing small amounts of blood is normal.     · You have trouble moving the area near the cut.     · You have symptoms of infection, such as:  ? Increased pain, swelling, warmth, or redness around the cut.  ? Red streaks leading from the cut.  ? Pus draining from the cut.  ? A fever.    Watch closely for changes in your health, and be sure to contact your doctor if:    · The cut reopens.     · You do not get better as expected. Where can you learn more? Go to http://shawn-lachelle.info/. Enter M735 in the search box to learn more about \"Cuts: Care Instructions. \"  Current as of: September 23, 2018  Content Version: 12.1  © 3253-5558 [employer]. Care instructions adapted under license by [employer] (which disclaims liability or warranty for this information). If you have questions about a medical condition or this instruction, always ask your healthcare professional. [place] [address] any warranty or liability for your use of this information. Patient Education        Learning About a Closed Head Injury  What is a closed head injury? A closed head injury happens when your head gets hit hard. The strong force of the blow causes your brain to shake in your skull. This movement can cause the brain to bruise, swell, or tear. Sometimes nerves or blood vessels also get damaged. This can cause bleeding in or around the brain. A concussion is a type of closed head injury. What are the symptoms? If you have a mild concussion, you may have a mild headache or feel \"not quite right. \" These symptoms are common. They usually go away over a few days to 4 weeks. But sometimes after a concussion, you feel like you can't function as well as before the injury. And you have new symptoms. This is called postconcussive syndrome. You may:  · Find it harder to solve problems, think, concentrate, or remember. · Have headaches. · Have changes in your sleep patterns, such as not being able to sleep or sleeping all the time. · Have changes in your personality. · Not be interested in your usual activities. · Feel angry or anxious without a clear reason. · Lose your sense of taste or smell. · Be dizzy, lightheaded, or unsteady. It may be hard to stand or walk. How is a closed head injury treated? Any person who may have a concussion needs to see a doctor.  Some people have to stay in the hospital to be watched. Others can go home safely. If you go home, follow your doctor's instructions. He or she will tell you if you need someone to watch you closely for the next 24 hours or longer. Rest is the best treatment. Get plenty of sleep at night. And try to rest during the day. · Avoid activities that are physically or mentally demanding. These include housework, exercise, and schoolwork. And don't play video games, send text messages, or use the computer. You may need to change your school or work schedule to be able to avoid these activities. · Ask your doctor when it's okay to drive, ride a bike, or operate machinery. · Take an over-the-counter pain medicine, such as acetaminophen (Tylenol), ibuprofen (Advil, Motrin), or naproxen (Aleve). Be safe with medicines. Read and follow all instructions on the label. · Check with your doctor before you use any other medicines for pain. · Do not drink alcohol or use illegal drugs. They can slow recovery. They can also increase your risk of getting a second head injury. Follow-up care is a key part of your treatment and safety. Be sure to make and go to all appointments, and call your doctor if you are having problems. It's also a good idea to know your test results and keep a list of the medicines you take. Where can you learn more? Go to http://shawn-lachelle.info/. Enter E235 in the search box to learn more about \"Learning About a Closed Head Injury. \"  Current as of: March 28, 2019  Content Version: 12.1  © 1450-7708 [employer]. Care instructions adapted under license by [employer] (which disclaims liability or warranty for this information). If you have questions about a medical condition or this instruction, always ask your healthcare professional. [address] any warranty or liability for your use of this information.

## 2019-08-18 NOTE — ED PROVIDER NOTES
EMERGENCY DEPARTMENT HISTORY AND PHYSICAL EXAM           Date: 8/18/2019  Patient Name: Vanesa Dietrich    History of Presenting Illness     Chief Complaint   Patient presents with    Head Injury     mechanical fall onto steel piece in the garage, pt states she was dancing prior to fall - admits to ETOH use this evening - event occurred 45 min PTA - Denies LOC / N / V / weakness (general nor focal) / headache / vision changes        History Provided By: Patient    HPI: Vanesa Dietrich is a 45 y.o. female, without significant pmhx, who presents ambulatory to the ED with c/o laceration to right forehead after falling. Patient was drinking at a friend's house and dancing on a stool when she lost her balance falling forward and hitting her head on metal.  Patient denies having loss of consciousness, vision change, neck pain, nausea/vomiting. Patient reports her tetanus shot is up-to-date. Pt specifically denies any recent fevers, chills, nausea, vomiting, diarrhea, abd pain, CP, SOB, urinary sxs, changes in BM, or headache. PCP: Ping Bejarano MD    Social Hx: + tobacco,  Occasional   EtOH, denies Illicit Drugs     There are no other complaints, changes, or physical findings at this time. Allergies   Allergen Reactions    Beef Containing Products Other (comments)     Gi distress     Codeine Hives         Current Outpatient Medications   Medication Sig Dispense Refill    ibuprofen (MOTRIN IB) 200 mg tablet Take 4 Tabs by mouth every eight (8) hours as needed for Pain. Indications: Pain 30 Tab 0    oxyCODONE-acetaminophen (PERCOCET) 5-325 mg per tablet Take 1 Tab by mouth every four (4) hours as needed for Pain.          Past History     Past Medical History:  Past Medical History:   Diagnosis Date    Other ill-defined conditions(463.97)     ovarian cyst     Psychiatric disorder     suicide attempt, depression        Past Surgical History:  Past Surgical History:   Procedure Laterality Date    HX APPENDECTOMY      1999    HX GYN      BTL    HX GYN      tubes tied 10/2008       Family History:  Family History   Problem Relation Age of Onset    Heart Attack Mother     Hypertension Father     No Known Problems Sister     Cancer Maternal Aunt     Breast Cancer Maternal Aunt     No Known Problems Maternal Uncle     No Known Problems Paternal Aunt     Cancer Paternal Uncle         testicular cancer    Cancer Maternal Grandmother         lung/leukemia    Breast Cancer Maternal Grandmother     Diabetes Maternal Grandfather     No Known Problems Paternal Grandmother     Stroke Paternal Grandfather     Asthma Son     Breast Cancer Maternal Aunt        Social History:  Social History     Tobacco Use    Smoking status: Current Every Day Smoker     Packs/day: 1.00     Years: 15.00     Pack years: 15.00     Types: Cigarettes    Smokeless tobacco: Never Used   Substance Use Topics    Alcohol use: Yes     Alcohol/week: 1.0 standard drinks     Types: 1 Cans of beer per week     Comment: occasional-weekends    Drug use: Never       Allergies: Allergies   Allergen Reactions    Beef Containing Products Other (comments)     Gi distress     Codeine Hives         Review of Systems   Review of Systems   Constitutional: Negative. Negative for fever. Eyes: Negative. Respiratory: Negative. Negative for shortness of breath. Cardiovascular: Negative for chest pain. Gastrointestinal: Negative for abdominal pain, nausea and vomiting. Endocrine: Negative. Genitourinary: Negative. Negative for difficulty urinating, dysuria and hematuria. Musculoskeletal: Negative. Skin: Positive for wound (R forehead laceration). Neurological: Negative. Psychiatric/Behavioral: Negative for suicidal ideas. All other systems reviewed and are negative. Physical Exam   Physical Exam   Constitutional: She is oriented to person, place, and time. She appears well-developed and well-nourished. No distress. HENT:   Head: Normocephalic. Head is with laceration. Nose: Nose normal.   Eyes: Conjunctivae and EOM are normal. No scleral icterus. Neck: Normal range of motion and full passive range of motion without pain. Neck supple. No spinous process tenderness and no muscular tenderness present. No neck rigidity. No tracheal deviation present. Cardiovascular: Normal rate, regular rhythm, normal heart sounds and intact distal pulses. Exam reveals no friction rub. No murmur heard. Pulmonary/Chest: Effort normal and breath sounds normal. No stridor. No respiratory distress. She has no wheezes. She has no rales. Abdominal: Soft. Bowel sounds are normal. She exhibits no distension. There is no tenderness. There is no rebound. Musculoskeletal: Normal range of motion. She exhibits no tenderness. Neurological: She is alert and oriented to person, place, and time. No cranial nerve deficit. Skin: Skin is warm and dry. No rash noted. She is not diaphoretic. Psychiatric: She has a normal mood and affect. Her speech is normal and behavior is normal. Thought content normal. Cognition and memory are normal. She expresses impulsivity. Nursing note and vitals reviewed. Medical Decision Making   I am the first provider for this patient. I reviewed the vital signs, available nursing notes, past medical history, past surgical history, family history and social history. Vital Signs-Reviewed the patient's vital signs. Patient Vitals for the past 12 hrs:   Temp Pulse Resp BP SpO2   08/18/19 0121 97.9 °F (36.6 °C) 100 18 131/83 96 %       Pulse Oximetry Analysis - 96% on RA    Records Reviewed: Nursing Notes and Old Medical Records    Provider Notes (Medical Decision Making):     DDX:  Facial laceration, closed head injury, need for Tdap update    Plan:  Suture repair, Tdap update    Plan for head CT however patient refusing.   Patient able to verbalize understanding of risks versus benefits of not having her scan done specifically noting undiagnosed intracranial bleed with potential associated morbidity and mortality. His friends at bedside and aware of her refusal for plan. Impression:  Closed head injury, facial laceration, updated Tdap    ED Course:   Initial assessment performed. The patients presenting problems have been discussed, and they are in agreement with the care plan formulated and outlined with them. I have encouraged them to ask questions as they arise throughout their visit. I reviewed our electronic medical record system for any past medical records that were available that may contribute to the patients current condition, the nursing notes and and vital signs from today's visit  Nursing notes will be reviewed as they become available in realtime while the pt has been in the ED. Lv Ching MD        TOBACCO COUNSELING:  During evaluation pt reported that they are a current tobacco user. I have spent 3 minutes discussing the medical risks of prolonged smoking habits and advised the patient of the benefits of the cessation of smoking, providing specific suggestions on how to quit. Pt has been counseled and encouraged to quit as soon as possible in order to decrease further risks to their health. Pt has conveyed their understanding of the risks involved should they continue to use tobacco products. Lv Ching MD           Procedure Note - Wound Repair:    Performed by Lv Ching MD .     Immediately prior to the procedure, the patient was reevaluated and found suitable for the planned procedure and any planned medications. Immediately prior to the procedure a time out was called to verify the correct patient, procedure, equipment, staff, and marking as appropriate. Neurovascular function was Intact. Site prepped with ChloraPrep, Betadine and Sterile draping. Anesthesia was obtained via local infiltration with 1% lidocaine and with epinephrine.  Wound irrigated with copious amounts of normal saline and explored. Wound was located on the right face, measured 5 cm and was linear. Level of complexity was: layered. Wound was closed using Two layer suture closure: Subcutaneous Layer:  2 sutures placed, stitch type:simple interrupted, suture: 4-0 braided absorbable. Skin Layer:  5 sutures placed, stitch type:running locked, suture: 5-0 nylon. .  Foreign body was not suspected. Foreign body was not found. Procedure was tolerated well.    0400  Progress note:  Pt noted to be feeling better, ready for discharge. Pt will follow up with pcp as instructed. All questions have been answered, pt voiced understanding and agreement with plan. Specific return precautions provided in addition to instructions for pt to return to the ED immediately should sx worsen at any time. Varinder Ross MD      Critical Care Time:     none      Diagnosis     Clinical Impression:   1. Facial laceration, initial encounter    2. Acute alcohol use    3. Closed head injury, initial encounter        PLAN:  1. Discharge Medication List as of 8/18/2019  3:33 AM        2. Follow-up Information     Follow up With Specialties Details Why Contact Info    Read, Charmayne Lander, MD Family Practice Schedule an appointment as soon as possible for a visit in 1 week For suture removal [address] [place] [address]  [phone]          Return to ED if worse     Disposition:    0400   The patient's results have been reviewed with family and/or caregiver. They verbally convey their understanding and agreement of the patient's signs, symptoms, diagnosis, treatment and prognosis and additionally agree to follow up as recommended in the discharge instructions or to return to the Emergency Room should the patient's condition change prior to their follow-up appointment.  The family and/or caregiver verbally agrees with the care-plan and all of their questions have been answered. The discharge instructions have also been provided to the them with educational information regarding the patient's diagnosis as well a list of reasons why the patient would want to return to the ER prior to their follow-up appointment should their condition change. Chad Rollins MD            Please note that this dictation was completed with [employer], the computer voice recognition software. Quite often unanticipated grammatical, syntax, homophones, and other interpretive errors are inadvertently transcribed by the computer software. Please disregard these errors. Please excuse any errors that have escaped final proofreading. This note will not be viewable in [address]

## 2019-08-26 ENCOUNTER — OFFICE VISIT (OUTPATIENT)
Dept: URGENT CARE | Age: 39
End: 2019-08-26

## 2019-08-26 VITALS
TEMPERATURE: 98.3 F | HEIGHT: 67 IN | DIASTOLIC BLOOD PRESSURE: 68 MMHG | SYSTOLIC BLOOD PRESSURE: 118 MMHG | BODY MASS INDEX: 25.11 KG/M2 | RESPIRATION RATE: 16 BRPM | WEIGHT: 160 LBS | OXYGEN SATURATION: 97 % | HEART RATE: 96 BPM

## 2019-08-26 DIAGNOSIS — Z48.02 VISIT FOR SUTURE REMOVAL: Primary | ICD-10-CM

## 2019-08-26 NOTE — PROGRESS NOTES
The history is provided by the patient. Suture Removal   Episode onset: 9 days ago. The problem has been resolved. Pertinent negatives include no headaches and no shortness of breath. Associated symptoms comments: none. Nothing aggravates the symptoms. Nothing relieves the symptoms. She has tried nothing for the symptoms. Patient received sutures on right upper forehead on 8/18/2019. She states she is visiting for removal today. They were placed at [facility]. Denies oozing, drainage or pain at affected site.     Past Medical History:   Diagnosis Date    Other ill-defined conditions(289.64)     ovarian cyst     Psychiatric disorder     suicide attempt, depression         Past Surgical History:   Procedure Laterality Date    HX APPENDECTOMY      1999    HX GYN      BTL    HX GYN      tubes tied 10/2008    HX HYSTERECTOMY           Family History   Problem Relation Age of Onset    Heart Attack Mother     Hypertension Father     No Known Problems Sister     Cancer Maternal Aunt     Breast Cancer Maternal Aunt     No Known Problems Maternal Uncle     No Known Problems Paternal Aunt     Cancer Paternal Uncle         testicular cancer    Cancer Maternal Grandmother         lung/leukemia    Breast Cancer Maternal Grandmother     Diabetes Maternal Grandfather     No Known Problems Paternal Grandmother     Stroke Paternal Grandfather     Asthma Son     Breast Cancer Maternal Aunt         Social History     Socioeconomic History    Marital status:      Spouse name: Not on file    Number of children: Not on file    Years of education: Not on file    Highest education level: Not on file   Occupational History    Not on file   Social Needs    Financial resource strain: Not on file    Food insecurity:     Worry: Not on file     Inability: Not on file    Transportation needs:     Medical: Not on file     Non-medical: Not on file   Tobacco Use    Smoking status: Current Every Day Smoker Packs/day: 1.00     Years: 15.00     Pack years: 15.00     Types: Cigarettes    Smokeless tobacco: Never Used   Substance and Sexual Activity    Alcohol use: Yes     Alcohol/week: 1.0 standard drinks     Types: 1 Cans of beer per week     Comment: occasional-weekends    Drug use: Never    Sexual activity: Yes     Birth control/protection: Surgical     Comment:     Lifestyle    Physical activity:     Days per week: Not on file     Minutes per session: Not on file    Stress: Not on file   Relationships    Social connections:     Talks on phone: Not on file     Gets together: Not on file     Attends Jewish service: Not on file     Active member of club or organization: Not on file     Attends meetings of clubs or organizations: Not on file     Relationship status: Not on file    Intimate partner violence:     Fear of current or ex partner: Not on file     Emotionally abused: Not on file     Physically abused: Not on file     Forced sexual activity: Not on file   Other Topics Concern    Not on file   Social History Narrative    Not on file                ALLERGIES: Beef containing products and Codeine    Review of Systems   Constitutional: Negative for chills and fever. HENT: Negative. Negative for facial swelling. Respiratory: Negative for cough and shortness of breath. Cardiovascular: Negative. Gastrointestinal: Negative. Skin:        Suture removal on right upper forehead   Neurological: Negative for dizziness, facial asymmetry, light-headedness, numbness and headaches. Vitals:    08/26/19 1044   BP: 118/68   Pulse: 96   Resp: 16   Temp: 98.3 °F (36.8 °C)   SpO2: 97%   Weight: 160 lb (72.6 kg)   Height: 5' 7\" (1.702 m)       Physical Exam   Constitutional: She is oriented to person, place, and time. She appears well-developed and well-nourished. Cardiovascular: Normal rate, regular rhythm and normal heart sounds.    Pulmonary/Chest: Effort normal and breath sounds normal. Musculoskeletal: Normal range of motion. Neurological: She is alert and oriented to person, place, and time. Skin: Skin is warm and dry. 5 sutures to right upper forehead. No oozing or drainage at site. Site did have mild crusting. Skin intake after removal.  No signs of infection. Psychiatric: She has a normal mood and affect. MDM     Differential Diagnosis; Clinical Impression; Plan:     (Z48.02) Visit for suture removal  (primary encounter diagnosis)  No orders of the defined types were placed in this encounter. Advised keep area clean and use antibiotic ointment. The patients condition was discussed with the patient and they understand. The patient is to follow up with PCP. If signs and symptoms become worse the pt is to go to the ER. The patient is to take medications as prescribed. AVS given with patient instructions upon discharge. Suture/Staple Removal  Date/Time: 8/26/2019 11:13 AM  Performed by: Deandra Lopez NP  Authorized by: Rosy Lopez NP   Patient tolerance: Patient tolerated the procedure well with no immediate complications  Body area: head/neck  Location details: forehead  Wound Appearance: clean  Sutures Removed: 5  Post-removal: antibiotic ointment applied  My total time at bedside, performing this procedure was 1-15 minutes.

## 2019-08-26 NOTE — PATIENT INSTRUCTIONS

## 2019-09-23 ENCOUNTER — OFFICE VISIT (OUTPATIENT)
Dept: URGENT CARE | Age: 39
End: 2019-09-23

## 2019-09-23 VITALS
HEART RATE: 78 BPM | DIASTOLIC BLOOD PRESSURE: 109 MMHG | TEMPERATURE: 98.7 F | BODY MASS INDEX: 25.11 KG/M2 | HEIGHT: 67 IN | SYSTOLIC BLOOD PRESSURE: 140 MMHG | WEIGHT: 160 LBS | RESPIRATION RATE: 18 BRPM | OXYGEN SATURATION: 97 %

## 2019-09-23 DIAGNOSIS — J06.9 VIRAL URI WITH COUGH: Primary | ICD-10-CM

## 2019-09-23 RX ORDER — CETIRIZINE HCL 10 MG
10 TABLET ORAL DAILY
Qty: 30 TAB | Refills: 0 | Status: SHIPPED | OUTPATIENT
Start: 2019-09-23 | End: 2020-02-11

## 2019-09-23 RX ORDER — BENZONATATE 200 MG/1
200 CAPSULE ORAL
Qty: 20 CAP | Refills: 0 | Status: SHIPPED | OUTPATIENT
Start: 2019-09-23 | End: 2019-09-30

## 2019-09-23 RX ORDER — PROMETHAZINE HYDROCHLORIDE AND DEXTROMETHORPHAN HYDROBROMIDE 6.25; 15 MG/5ML; MG/5ML
5 SYRUP ORAL
Qty: 60 ML | Refills: 0 | Status: SHIPPED | OUTPATIENT
Start: 2019-09-23 | End: 2019-12-10

## 2019-09-23 RX ORDER — FLUTICASONE PROPIONATE 50 MCG
2 SPRAY, SUSPENSION (ML) NASAL DAILY
Qty: 1 BOTTLE | Refills: 0 | Status: SHIPPED | OUTPATIENT
Start: 2019-09-23 | End: 2020-02-11

## 2019-09-23 NOTE — PROGRESS NOTES
Cold Symptoms   The history is provided by the patient. This is a new problem. The current episode started 2 days ago. The problem occurs every few minutes. The problem has not changed since onset. The cough is non-productive. There has been no fever. Associated symptoms include ear congestion, headaches, rhinorrhea and sore throat. Pertinent negatives include no shortness of breath and no wheezing. She has tried nothing for the symptoms. She is a smoker. Her past medical history is significant for bronchitis. Her past medical history does not include pneumonia or asthma.         Past Medical History:   Diagnosis Date    Other ill-defined conditions(950.73)     ovarian cyst     Psychiatric disorder     suicide attempt, depression         Past Surgical History:   Procedure Laterality Date    HX APPENDECTOMY      1999    HX GYN      BTL    HX GYN      tubes tied 10/2008    HX HYSTERECTOMY           Family History   Problem Relation Age of Onset    Heart Attack Mother     Hypertension Father     No Known Problems Sister     Cancer Maternal Aunt     Breast Cancer Maternal Aunt     No Known Problems Maternal Uncle     No Known Problems Paternal Aunt     Cancer Paternal Uncle         testicular cancer    Cancer Maternal Grandmother         lung/leukemia    Breast Cancer Maternal Grandmother     Diabetes Maternal Grandfather     No Known Problems Paternal Grandmother     Stroke Paternal Grandfather     Asthma Son     Breast Cancer Maternal Aunt         Social History     Socioeconomic History    Marital status:      Spouse name: Not on file    Number of children: Not on file    Years of education: Not on file    Highest education level: Not on file   Occupational History    Not on file   Social Needs    Financial resource strain: Not on file    Food insecurity:     Worry: Not on file     Inability: Not on file    Transportation needs:     Medical: Not on file     Non-medical: Not on file   Tobacco Use    Smoking status: Current Every Day Smoker     Packs/day: 1.00     Years: 15.00     Pack years: 15.00     Types: Cigarettes    Smokeless tobacco: Never Used   Substance and Sexual Activity    Alcohol use: Yes     Alcohol/week: 1.0 standard drinks     Types: 1 Cans of beer per week     Comment: occasional-weekends    Drug use: Never    Sexual activity: Yes     Birth control/protection: Surgical     Comment:     Lifestyle    Physical activity:     Days per week: Not on file     Minutes per session: Not on file    Stress: Not on file   Relationships    Social connections:     Talks on phone: Not on file     Gets together: Not on file     Attends Holiness service: Not on file     Active member of club or organization: Not on file     Attends meetings of clubs or organizations: Not on file     Relationship status: Not on file    Intimate partner violence:     Fear of current or ex partner: Not on file     Emotionally abused: Not on file     Physically abused: Not on file     Forced sexual activity: Not on file   Other Topics Concern    Not on file   Social History Narrative    Not on file                ALLERGIES: Beef containing products and Codeine    Review of Systems   HENT: Positive for congestion, rhinorrhea and sore throat. Respiratory: Negative for shortness of breath and wheezing. Neurological: Positive for headaches. All other systems reviewed and are negative. Vitals:    09/23/19 1610   BP: (!) 140/109   Pulse: 78   Resp: 18   Temp: 98.7 °F (37.1 °C)   SpO2: 97%   Weight: 160 lb (72.6 kg)   Height: 5' 7\" (1.702 m)       Physical Exam   Constitutional: No distress. HENT:   Right Ear: Tympanic membrane and ear canal normal.   Left Ear: Tympanic membrane and ear canal normal.   Nose: Nose normal.   Mouth/Throat: No oropharyngeal exudate, posterior oropharyngeal edema or posterior oropharyngeal erythema.    Eyes: Conjunctivae are normal. Right eye exhibits no discharge. Left eye exhibits no discharge. Neck: Neck supple. Pulmonary/Chest: Effort normal and breath sounds normal. No respiratory distress. She has no wheezes. She has no rales. Lymphadenopathy:     She has no cervical adenopathy. Skin: No rash noted. Nursing note and vitals reviewed. MDM    Procedures      ICD-10-CM ICD-9-CM    1. Viral URI with cough J06.9 465.9     B97.89       Medications Ordered Today   Medications    promethazine-dextromethorphan (PROMETHAZINE-DM) 6.25-15 mg/5 mL syrup     Sig: Take 5 mL by mouth nightly as needed for Cough. Dispense:  60 mL     Refill:  0    benzonatate (TESSALON) 200 mg capsule     Sig: Take 1 Cap by mouth three (3) times daily as needed for Cough for up to 7 days. Dispense:  20 Cap     Refill:  0    fluticasone propionate (FLONASE) 50 mcg/actuation nasal spray     Si Sprays by Both Nostrils route daily. Dispense:  1 Bottle     Refill:  0    cetirizine (ZYRTEC) 10 mg tablet     Sig: Take 1 Tab by mouth daily. Dispense:  30 Tab     Refill:  0     No results found for any visits on 19. The patients condition was discussed with the patient and they understand. The patient is to follow up with primary care doctor. If signs and symptoms become worse the pt is to go to the ER. The patient is to take medications as prescribed.

## 2019-09-23 NOTE — LETTER
NOTIFICATION RETURN TO WORK / SCHOOL 
 
9/23/2019 4:45 PM 
 
Ms. Eriberto Bennett [address]  [facility] [address] To Whom It May Concern: Eriberto Bennett is currently under the care of [facility]. She will return to work/school on: 9/25/19 If there are questions or concerns please have the patient contact our office. Sincerely, Bari Mortimer, MD

## 2019-09-23 NOTE — PATIENT INSTRUCTIONS
Saline Nasal Washes: Care Instructions  Your Care Instructions  Saline nasal washes help keep the nasal passages open by washing out thick or dried mucus. This simple remedy can help relieve symptoms of allergies, sinusitis, and colds. It also can make the nose feel more comfortable by keeping the mucous membranes moist. You may notice a little burning sensation in your nose the first few times you use the solution, but this usually gets better in a few days. Follow-up care is a key part of your treatment and safety. Be sure to make and go to all appointments, and call your doctor if you are having problems. It's also a good idea to know your test results and keep a list of the medicines you take. How can you care for yourself at home? · You can buy premixed saline solution in a squeeze bottle or other sinus rinse products at a drugstore. Read and follow the instructions on the label. · You also can make your own saline solution by adding 1 teaspoon of salt and 1 teaspoon of baking soda to 2 cups of distilled water. · If you use a homemade solution, pour a small amount into a clean bowl. Using a rubber bulb syringe, squeeze the syringe and place the tip in the salt water. Pull a small amount of the salt water into the syringe by relaxing your hand. · Sit down with your head tilted slightly back. Do not lie down. Put the tip of the bulb syringe or the squeeze bottle a little way into one of your nostrils. Gently drip or squirt a few drops into the nostril. Repeat with the other nostril. Some sneezing and gagging are normal at first.  · Gently blow your nose. · Wipe the syringe or bottle tip clean after each use. · Repeat this 2 or 3 times a day. · Use nasal washes gently if you have nosebleeds often. When should you call for help? Watch closely for changes in your health, and be sure to contact your doctor if:    · You often get nosebleeds.     · You have problems doing the nasal washes.    Where can you learn more? Go to http://shawn-lachelle.info/. Enter [id number] [id number] [phone] in the search box to learn more about \"Saline Nasal Washes: Care Instructions. \"  Current as of: October 21, 2018  Content Version: 12.2  © 5097-8769 [employer] Care instructions adapted under license by [employer] (which disclaims liability or warranty for this information). If you have questions about a medical condition or this instruction, always ask your healthcare professional. [address] any warranty or liability for your use of this information.

## 2019-12-10 ENCOUNTER — OFFICE VISIT (OUTPATIENT)
Dept: URGENT CARE | Age: 39
End: 2019-12-10

## 2019-12-10 VITALS
TEMPERATURE: 98.8 F | RESPIRATION RATE: 18 BRPM | SYSTOLIC BLOOD PRESSURE: 128 MMHG | BODY MASS INDEX: 25.58 KG/M2 | HEART RATE: 87 BPM | HEIGHT: 67 IN | DIASTOLIC BLOOD PRESSURE: 89 MMHG | OXYGEN SATURATION: 97 % | WEIGHT: 163 LBS

## 2019-12-10 DIAGNOSIS — G44.83 COUGH HEADACHE: Primary | ICD-10-CM

## 2019-12-10 DIAGNOSIS — Z20.828 EXPOSURE TO THE FLU: ICD-10-CM

## 2019-12-10 LAB
FLUAV+FLUBV AG NOSE QL IA.RAPID: NEGATIVE POS/NEG
FLUAV+FLUBV AG NOSE QL IA.RAPID: NEGATIVE POS/NEG
VALID INTERNAL CONTROL?: YES

## 2019-12-10 RX ORDER — OSELTAMIVIR PHOSPHATE 75 MG/1
75 CAPSULE ORAL 2 TIMES DAILY
Qty: 10 CAP | Refills: 0 | Status: SHIPPED | OUTPATIENT
Start: 2019-12-10 | End: 2019-12-15

## 2019-12-10 NOTE — LETTER
[address] [facility] 
[address] 
[place] [address] 
[phone] Work/School Note Date: 12/10/2019 To Whom It May concern: Emma Mancia was seen and treated today in the urgent care center . Emma Mancia may return to work on 12/13/2019. Sincerely, Manju Gallardo MD

## 2019-12-10 NOTE — PROGRESS NOTES
Cough   The history is provided by the patient. This is a new problem. Episode onset: this AM. The problem occurs every few minutes. The problem has been gradually worsening. The cough is non-productive. Patient reports a subjective fever - was not measured. Associated symptoms include chills, rhinorrhea and myalgias. Pertinent negatives include no chest pain, no sweats, no ear congestion, no ear pain, no headaches, no sore throat, no shortness of breath, no wheezing, no nausea and no vomiting. She has tried nothing for the symptoms. Risk factors: Son dx'ed with influenza today. She is a smoker.         Past Medical History:   Diagnosis Date    Other ill-defined conditions(279.89)     ovarian cyst     Psychiatric disorder     suicide attempt, depression         Past Surgical History:   Procedure Laterality Date    HX APPENDECTOMY      1999    HX GYN      BTL    HX GYN      tubes tied 10/2008    HX HYSTERECTOMY           Family History   Problem Relation Age of Onset    Heart Attack Mother     Hypertension Father     No Known Problems Sister     Cancer Maternal Aunt     Breast Cancer Maternal Aunt     No Known Problems Maternal Uncle     No Known Problems Paternal Aunt     Cancer Paternal Uncle         testicular cancer    Cancer Maternal Grandmother         lung/leukemia    Breast Cancer Maternal Grandmother     Diabetes Maternal Grandfather     No Known Problems Paternal Grandmother     Stroke Paternal Grandfather     Asthma Son     Breast Cancer Maternal Aunt         Social History     Socioeconomic History    Marital status:      Spouse name: Not on file    Number of children: Not on file    Years of education: Not on file    Highest education level: Not on file   Occupational History    Not on file   Social Needs    Financial resource strain: Not on file    Food insecurity:     Worry: Not on file     Inability: Not on file    Transportation needs:     Medical: Not on file Non-medical: Not on file   Tobacco Use    Smoking status: Current Every Day Smoker     Packs/day: 1.00     Years: 15.00     Pack years: 15.00     Types: Cigarettes    Smokeless tobacco: Never Used   Substance and Sexual Activity    Alcohol use: Yes     Alcohol/week: 1.0 standard drinks     Types: 1 Cans of beer per week     Comment: occasional-weekends    Drug use: Never    Sexual activity: Yes     Birth control/protection: Surgical     Comment:     Lifestyle    Physical activity:     Days per week: Not on file     Minutes per session: Not on file    Stress: Not on file   Relationships    Social connections:     Talks on phone: Not on file     Gets together: Not on file     Attends Christian service: Not on file     Active member of club or organization: Not on file     Attends meetings of clubs or organizations: Not on file     Relationship status: Not on file    Intimate partner violence:     Fear of current or ex partner: Not on file     Emotionally abused: Not on file     Physically abused: Not on file     Forced sexual activity: Not on file   Other Topics Concern    Not on file   Social History Narrative    Not on file                ALLERGIES: Beef containing products and Codeine    Review of Systems   Constitutional: Positive for chills. Negative for activity change, appetite change and fever. HENT: Positive for congestion and rhinorrhea. Negative for ear pain, sinus pressure, sinus pain, sore throat and trouble swallowing. Respiratory: Positive for cough. Negative for shortness of breath and wheezing. Cardiovascular: Negative for chest pain and palpitations. Gastrointestinal: Negative for nausea and vomiting. Musculoskeletal: Positive for myalgias. Neurological: Negative for dizziness and headaches. Hematological: Negative for adenopathy.        Vitals:    12/10/19 1819 12/10/19 1845   BP: (!) 151/101 128/89   Pulse: 87    Resp: 18    Temp: 98.8 °F (37.1 °C)    SpO2: 97% Weight: 163 lb (73.9 kg)    Height: 5' 7\" (1.702 m)        Physical Exam  Vitals signs and nursing note reviewed. Constitutional:       General: She is not in acute distress. Appearance: She is well-developed. She is not diaphoretic. HENT:      Right Ear: Tympanic membrane, ear canal and external ear normal.      Left Ear: Tympanic membrane, ear canal and external ear normal.      Nose: Congestion and rhinorrhea present. Right Sinus: No maxillary sinus tenderness or frontal sinus tenderness. Left Sinus: No maxillary sinus tenderness or frontal sinus tenderness. Mouth/Throat:      Pharynx: No oropharyngeal exudate or posterior oropharyngeal erythema. Tonsils: No tonsillar abscesses. Cardiovascular:      Rate and Rhythm: Normal rate and regular rhythm. Heart sounds: Normal heart sounds. Pulmonary:      Effort: Pulmonary effort is normal. No respiratory distress. Breath sounds: Normal breath sounds. No wheezing or rales. Lymphadenopathy:      Cervical: No cervical adenopathy. Neurological:      Mental Status: She is alert. Psychiatric:         Behavior: Behavior normal.         Thought Content: Thought content normal.         Judgment: Judgment normal.         MDM    ICD-10-CM ICD-9-CM   1. Cough headache G44.83 339.83   2. Exposure to the flu Z20.828 V01.79       Orders Placed This Encounter    AMB POC JULIAN INFLUENZA A/B TEST    oseltamivir (TAMIFLU) 75 mg capsule     Sig: Take 1 Cap by mouth two (2) times a day for 5 days. Dispense:  10 Cap     Refill:  0      Increase fluids with electrolytes  The patient is to follow up with PCP [facility]. If signs and symptoms become worse the pt is to go to the ER.      Results for orders placed or performed in visit on 12/10/19   AMB POC JULIAN INFLUENZA A/B TEST   Result Value Ref Range    VALID INTERNAL CONTROL POC Yes     Influenza A Ag POC Negative Negative Pos/Neg    Influenza B Ag POC Negative Negative Pos/Neg Procedures

## 2019-12-10 NOTE — PATIENT INSTRUCTIONS
Influenza (Flu): Care Instructions Your Care Instructions Influenza (flu) is an infection in the lungs and breathing passages. It is caused by the influenza virus. There are different strains, or types, of the flu virus from year to year. Unlike the common cold, the flu comes on suddenly and the symptoms, such as a cough, congestion, fever, chills, fatigue, aches, and pains, are more severe. These symptoms may last up to 10 days. Although the flu can make you feel very sick, it usually doesn't cause serious health problems. Home treatment is usually all you need for flu symptoms. But your doctor may prescribe antiviral medicine to prevent other health problems, such as pneumonia, from developing. Older people and those who have a long-term health condition, such as lung disease, are most at risk for having pneumonia or other health problems. Follow-up care is a key part of your treatment and safety. Be sure to make and go to all appointments, and call your doctor if you are having problems. It's also a good idea to know your test results and keep a list of the medicines you take. How can you care for yourself at home? · Get plenty of rest. 
· Drink plenty of fluids, enough so that your urine is light yellow or clear like water. If you have kidney, heart, or liver disease and have to limit fluids, talk with your doctor before you increase the amount of fluids you drink. · Take an over-the-counter pain medicine if needed, such as acetaminophen (Tylenol), ibuprofen (Advil, Motrin), or naproxen (Aleve), to relieve fever, headache, and muscle aches. Read and follow all instructions on the label. No one younger than 20 should take aspirin. It has been linked to Reye syndrome, a serious illness. · Do not smoke. Smoking can make the flu worse. If you need help quitting, talk to your doctor about stop-smoking programs and medicines. These can increase your chances of quitting for good. · Breathe moist air from a hot shower or from a sink filled with hot water to help clear a stuffy nose. · Before you use cough and cold medicines, check the label. These medicines may not be safe for young children or for people with certain health problems. · If the skin around your nose and lips becomes sore, put some petroleum jelly on the area. · To ease coughing: ? Drink fluids to soothe a scratchy throat. ? Suck on cough drops or plain hard candy. ? Take an over-the-counter cough medicine that contains dextromethorphan to help you get some sleep. Read and follow all instructions on the label. ? Raise your head at night with an extra pillow. This may help you rest if coughing keeps you awake. · Take any prescribed medicine exactly as directed. Call your doctor if you think you are having a problem with your medicine. To avoid spreading the flu · Wash your hands regularly, and keep your hands away from your face. · Stay home from school, work, and other public places until you are feeling better and your fever has been gone for at least 24 hours. The fever needs to have gone away on its own without the help of medicine. · Ask people living with you to talk to their doctors about preventing the flu. They may get antiviral medicine to keep from getting the flu from you. · To prevent the flu in the future, get a flu vaccine every fall. Encourage people living with you to get the vaccine. · Cover your mouth when you cough or sneeze. When should you call for help? Call 911 anytime you think you may need emergency care.  For example, call if: 
  · You have severe trouble breathing.  
 Call your doctor now or seek immediate medical care if: 
  · You have new or worse trouble breathing.  
  · You seem to be getting much sicker.  
  · You feel very sleepy or confused.  
  · You have a new or higher fever.  
  · You get a new rash.  
 Watch closely for changes in your health, and be sure to contact your doctor if: 
  · You begin to get better and then get worse.  
  · You are not getting better after 1 week. Where can you learn more? Go to http://shawn-lachelle.info/. Enter K595 in the search box to learn more about \"Influenza (Flu): Care Instructions. \" Current as of: June 9, 2019 Content Version: 12.2 © 5519-3142 [employer]. Care instructions adapted under license by [employer] (which disclaims liability or warranty for this information). If you have questions about a medical condition or this instruction, always ask your healthcare professional. [place] [address] any warranty or liability for your use of this information.

## 2020-02-11 ENCOUNTER — OFFICE VISIT (OUTPATIENT)
Dept: URGENT CARE | Age: 40
End: 2020-02-11

## 2020-02-11 VITALS
HEART RATE: 74 BPM | WEIGHT: 169 LBS | TEMPERATURE: 98.3 F | OXYGEN SATURATION: 100 % | RESPIRATION RATE: 20 BRPM | SYSTOLIC BLOOD PRESSURE: 126 MMHG | HEIGHT: 67 IN | DIASTOLIC BLOOD PRESSURE: 76 MMHG | BODY MASS INDEX: 26.53 KG/M2

## 2020-02-11 DIAGNOSIS — R05.9 COUGH: ICD-10-CM

## 2020-02-11 DIAGNOSIS — J32.9 BACTERIAL SINUSITIS: Primary | ICD-10-CM

## 2020-02-11 DIAGNOSIS — B96.89 BACTERIAL SINUSITIS: Primary | ICD-10-CM

## 2020-02-11 RX ORDER — BENZONATATE 200 MG/1
200 CAPSULE ORAL
Qty: 30 CAP | Refills: 0 | Status: SHIPPED | OUTPATIENT
Start: 2020-02-11 | End: 2021-06-30 | Stop reason: SDUPTHER

## 2020-02-11 RX ORDER — AMOXICILLIN AND CLAVULANATE POTASSIUM 875; 125 MG/1; MG/1
1 TABLET, FILM COATED ORAL 2 TIMES DAILY
Qty: 20 TAB | Refills: 0 | Status: SHIPPED | OUTPATIENT
Start: 2020-02-11 | End: 2020-02-21

## 2020-02-11 NOTE — LETTER
[address] [facility] [address] 
[place] [address] 
[phone] Work/School Note Date: 2/11/2020 To Whom It May concern: Kimmy Person was seen and treated today in the urgent care center by the following:  Federica Brambila DNP Kimmy Person may return to work on 2/13/2020 or sooner if feeling better. Sincerely, Federica Brambila NP

## 2020-02-12 NOTE — PATIENT INSTRUCTIONS
Upper Respiratory Infection (Cold): Care Instructions  Your Care Instructions    An upper respiratory infection, or URI, is an infection of the nose, sinuses, or throat. URIs are spread by coughs, sneezes, and direct contact. The common cold is the most frequent kind of URI. The flu and sinus infections are other kinds of URIs. Almost all URIs are caused by viruses. Antibiotics won't cure them. But you can treat most infections with home care. This may include drinking lots of fluids and taking over-the-counter pain medicine. You will probably feel better in 4 to 10 days. The doctor has checked you carefully, but problems can develop later. If you notice any problems or new symptoms, get medical treatment right away. Follow-up care is a key part of your treatment and safety. Be sure to make and go to all appointments, and call your doctor if you are having problems. It's also a good idea to know your test results and keep a list of the medicines you take. How can you care for yourself at home? · To prevent dehydration, drink plenty of fluids, enough so that your urine is light yellow or clear like water. Choose water and other caffeine-free clear liquids until you feel better. If you have kidney, heart, or liver disease and have to limit fluids, talk with your doctor before you increase the amount of fluids you drink. · Take an over-the-counter pain medicine, such as acetaminophen (Tylenol), ibuprofen (Advil, Motrin), or naproxen (Aleve). Read and follow all instructions on the label. · Before you use cough and cold medicines, check the label. These medicines may not be safe for young children or for people with certain health problems. · Be careful when taking over-the-counter cold or flu medicines and Tylenol at the same time. Many of these medicines have acetaminophen, which is Tylenol. Read the labels to make sure that you are not taking more than the recommended dose.  Too much acetaminophen (Tylenol) can be harmful. · Get plenty of rest.  · Do not smoke or allow others to smoke around you. If you need help quitting, talk to your doctor about stop-smoking programs and medicines. These can increase your chances of quitting for good. When should you call for help? Call 911 anytime you think you may need emergency care. For example, call if:    · You have severe trouble breathing.    Call your doctor now or seek immediate medical care if:    · You seem to be getting much sicker.     · You have new or worse trouble breathing.     · You have a new or higher fever.     · You have a new rash.    Watch closely for changes in your health, and be sure to contact your doctor if:    · You have a new symptom, such as a sore throat, an earache, or sinus pain.     · You cough more deeply or more often, especially if you notice more mucus or a change in the color of your mucus.     · You do not get better as expected. Where can you learn more? Go to http://shawn-lachelle.info/. Enter Q910 in the search box to learn more about \"Upper Respiratory Infection (Cold): Care Instructions. \"  Current as of: June 9, 2019  Content Version: 12.2  © 3251-1727 "[employer]. Care instructions adapted under license by [employer] (which disclaims liability or warranty for this information). If you have questions about a medical condition or this instruction, always ask your healthcare professional. [place] [address] any warranty or liability for your use of this information. Sinusitis: Care Instructions  Your Care Instructions    Sinusitis is an infection of the lining of the sinus cavities in your head. Sinusitis often follows a cold. It causes pain and pressure in your head and face. In most cases, sinusitis gets better on its own in 1 to 2 weeks. But some mild symptoms may last for several weeks. Sometimes antibiotics are needed.   Follow-up care is a key part of your treatment and safety. Be sure to make and go to all appointments, and call your doctor if you are having problems. It's also a good idea to know your test results and keep a list of the medicines you take. How can you care for yourself at home? · Take an over-the-counter pain medicine, such as acetaminophen (Tylenol), ibuprofen (Advil, Motrin), or naproxen (Aleve). Read and follow all instructions on the label. · If the doctor prescribed antibiotics, take them as directed. Do not stop taking them just because you feel better. You need to take the full course of antibiotics. · Be careful when taking over-the-counter cold or flu medicines and Tylenol at the same time. Many of these medicines have acetaminophen, which is Tylenol. Read the labels to make sure that you are not taking more than the recommended dose. Too much acetaminophen (Tylenol) can be harmful. · Breathe warm, moist air from a steamy shower, a hot bath, or a sink filled with hot water. Avoid cold, dry air. Using a humidifier in your home may help. Follow the directions for cleaning the machine. · Use saline (saltwater) nasal washes to help keep your nasal passages open and wash out mucus and bacteria. You can buy saline nose drops at a grocery store or drugstore. Or you can make your own at home by adding 1 teaspoon of salt and 1 teaspoon of baking soda to 2 cups of distilled water. If you make your own, fill a bulb syringe with the solution, insert the tip into your nostril, and squeeze gently. Andrey Rolls your nose. · Put a hot, wet towel or a warm gel pack on your face 3 or 4 times a day for 5 to 10 minutes each time. · Try a decongestant nasal spray like oxymetazoline (Afrin). Do not use it for more than 3 days in a row. Using it for more than 3 days can make your congestion worse. When should you call for help?   Call your doctor now or seek immediate medical care if:    · You have new or worse swelling or redness in your face or around your eyes.     · You have a new or higher fever.    Watch closely for changes in your health, and be sure to contact your doctor if:    · You have new or worse facial pain.     · The mucus from your nose becomes thicker (like pus) or has new blood in it.     · You are not getting better as expected. Where can you learn more? Go to http://shawn-lachelle.info/. Enter X940 in the search box to learn more about \"Sinusitis: Care Instructions. \"  Current as of: October 21, 2018  Content Version: 12.2  © 7143-8889 [employer]. Care instructions adapted under license by [employer] (which disclaims liability or warranty for this information). If you have questions about a medical condition or this instruction, always ask your healthcare professional. [address] any warranty or liability for your use of this information.

## 2020-02-12 NOTE — PROGRESS NOTES
The history is provided by the patient. Cold Symptoms   The history is provided by the patient. This is a new problem. The current episode started more than 1 week ago. The problem occurs constantly. The problem has been gradually worsening. The cough is non-productive. There has been no fever. Associated symptoms include ear congestion, ear pain, headaches and rhinorrhea. Pertinent negatives include no chest pain, no chills, no sweats, no sore throat, no shortness of breath, no wheezing, no nausea and no vomiting. Associated symptoms comments: Sinus pain and pressure. She has tried cough syrup for the symptoms. The treatment provided no relief.         Past Medical History:   Diagnosis Date    Other ill-defined conditions(676.03)     ovarian cyst     Psychiatric disorder     suicide attempt, depression         Past Surgical History:   Procedure Laterality Date    HX APPENDECTOMY      1999    HX GYN      BTL    HX GYN      tubes tied 10/2008    HX HYSTERECTOMY           Family History   Problem Relation Age of Onset    Heart Attack Mother     Hypertension Father     No Known Problems Sister     Cancer Maternal Aunt     Breast Cancer Maternal Aunt     No Known Problems Maternal Uncle     No Known Problems Paternal Aunt     Cancer Paternal Uncle         testicular cancer    Cancer Maternal Grandmother         lung/leukemia    Breast Cancer Maternal Grandmother     Diabetes Maternal Grandfather     No Known Problems Paternal Grandmother     Stroke Paternal Grandfather     Asthma Son     Breast Cancer Maternal Aunt         Social History     Socioeconomic History    Marital status:      Spouse name: Not on file    Number of children: Not on file    Years of education: Not on file    Highest education level: Not on file   Occupational History    Not on file   Social Needs    Financial resource strain: Not on file    Food insecurity:     Worry: Not on file     Inability: Not on file  Transportation needs:     Medical: Not on file     Non-medical: Not on file   Tobacco Use    Smoking status: Current Every Day Smoker     Packs/day: 1.00     Years: 15.00     Pack years: 15.00     Types: Cigarettes    Smokeless tobacco: Never Used   Substance and Sexual Activity    Alcohol use: Yes     Alcohol/week: 1.0 standard drinks     Types: 1 Cans of beer per week     Comment: occasional-weekends    Drug use: Never    Sexual activity: Yes     Birth control/protection: Surgical     Comment:     Lifestyle    Physical activity:     Days per week: Not on file     Minutes per session: Not on file    Stress: Not on file   Relationships    Social connections:     Talks on phone: Not on file     Gets together: Not on file     Attends Jew service: Not on file     Active member of club or organization: Not on file     Attends meetings of clubs or organizations: Not on file     Relationship status: Not on file    Intimate partner violence:     Fear of current or ex partner: Not on file     Emotionally abused: Not on file     Physically abused: Not on file     Forced sexual activity: Not on file   Other Topics Concern    Not on file   Social History Narrative    Not on file                ALLERGIES: Beef containing products and Codeine    Review of Systems   Constitutional: Positive for fatigue. Negative for chills and fever. HENT: Positive for congestion, ear discharge, ear pain, postnasal drip, rhinorrhea, sinus pressure and sinus pain. Negative for hearing loss, sore throat, trouble swallowing and voice change. Eyes: Negative. Respiratory: Positive for cough. Negative for chest tightness, shortness of breath and wheezing. Cardiovascular: Negative for chest pain. Gastrointestinal: Negative for nausea and vomiting. Genitourinary: Negative. Musculoskeletal: Negative. Skin: Negative. Neurological: Positive for headaches.  Negative for dizziness, syncope, weakness and light-headedness. Vitals:    02/11/20 1857   BP: 126/76   Pulse: 74   Resp: 20   Temp: 98.3 °F (36.8 °C)   SpO2: 100%   Weight: 169 lb (76.7 kg)   Height: 5' 7\" (1.702 m)       Physical Exam  Constitutional:       Appearance: Normal appearance. She is well-developed. HENT:      Right Ear: Tympanic membrane normal.      Left Ear: Tympanic membrane normal.      Nose: Congestion and rhinorrhea present. Comments: Ethmoid sinus pain and pressure upon palpation     Mouth/Throat:      Pharynx: No oropharyngeal exudate or posterior oropharyngeal erythema. Eyes:      Conjunctiva/sclera: Conjunctivae normal.      Pupils: Pupils are equal, round, and reactive to light. Neck:      Musculoskeletal: Normal range of motion. Cardiovascular:      Rate and Rhythm: Normal rate and regular rhythm. Heart sounds: Normal heart sounds. Pulmonary:      Effort: Pulmonary effort is normal.      Breath sounds: Normal breath sounds. Musculoskeletal: Normal range of motion. Lymphadenopathy:      Cervical: No cervical adenopathy. Skin:     General: Skin is warm and dry. Neurological:      Mental Status: She is alert and oriented to person, place, and time. MDM     Differential Diagnosis; Clinical Impression; Plan:     (J32.9,  B96.89) Bacterial sinusitis  (primary encounter diagnosis)  (R05) Cough  Orders Placed This Encounter      amoxicillin-clavulanate (AUGMENTIN) 875-125 mg per tablet          Sig: Take 1 Tab by mouth two (2) times a day for 10 days. Dispense:  20 Tab          Refill:  0      benzonatate (TESSALON) 200 mg capsule          Sig: Take 1 Cap by mouth three (3) times daily as needed for Cough. Dispense:  30 Cap          Refill:  0    Advised patient to take medication as prescribed. Use humidifier at night. Use warm compress to face or a netipot. Take a OTC decongestant like Sudafed or mucinex D. Increase water intake.   Take ibuprofen or tylenol as needed for discomfort. Patient agreed with plan of care. The patients condition was discussed with the patient and they understand. The patient is to follow up with PCP. If signs and symptoms become worse the pt is to go to the ER. The patient is to take medications as prescribed. AVS given with patient instructions upon discharge.                   Procedures

## 2020-06-19 ENCOUNTER — HOSPITAL ENCOUNTER (EMERGENCY)
Age: 40
Discharge: HOME | End: 2020-06-19
Attending: EMERGENCY MEDICINE
Payer: COMMERCIAL

## 2020-06-19 ENCOUNTER — OFFICE VISIT (OUTPATIENT)
Dept: SURGERY | Age: 40
End: 2020-06-19

## 2020-06-19 ENCOUNTER — APPOINTMENT (OUTPATIENT)
Dept: ULTRASOUND IMAGING | Age: 40
End: 2020-06-19
Attending: EMERGENCY MEDICINE
Payer: COMMERCIAL

## 2020-06-19 VITALS
BODY MASS INDEX: 26.92 KG/M2 | RESPIRATION RATE: 18 BRPM | OXYGEN SATURATION: 97 % | HEART RATE: 96 BPM | TEMPERATURE: 97.7 F | DIASTOLIC BLOOD PRESSURE: 86 MMHG | HEIGHT: 67 IN | SYSTOLIC BLOOD PRESSURE: 143 MMHG | WEIGHT: 171.5 LBS

## 2020-06-19 VITALS
OXYGEN SATURATION: 96 % | DIASTOLIC BLOOD PRESSURE: 95 MMHG | WEIGHT: 169.31 LBS | HEART RATE: 100 BPM | BODY MASS INDEX: 26.57 KG/M2 | RESPIRATION RATE: 15 BRPM | HEIGHT: 67 IN | TEMPERATURE: 97.7 F | SYSTOLIC BLOOD PRESSURE: 128 MMHG

## 2020-06-19 DIAGNOSIS — N63.10 BREAST MASS, RIGHT: ICD-10-CM

## 2020-06-19 DIAGNOSIS — N61.1 ABSCESS OF BREAST, RIGHT: Primary | ICD-10-CM

## 2020-06-19 DIAGNOSIS — N61.0 MASTITIS, RIGHT, ACUTE: Primary | ICD-10-CM

## 2020-06-19 LAB
ANION GAP SERPL CALC-SCNC: 3 MMOL/L (ref 5–15)
BUN SERPL-MCNC: 11 MG/DL (ref 6–20)
BUN/CREAT SERPL: 14 (ref 12–20)
CALCIUM SERPL-MCNC: 8 MG/DL (ref 8.5–10.1)
CHLORIDE SERPL-SCNC: 108 MMOL/L (ref 97–108)
CO2 SERPL-SCNC: 27 MMOL/L (ref 21–32)
CREAT SERPL-MCNC: 0.77 MG/DL (ref 0.55–1.02)
ERYTHROCYTE [DISTWIDTH] IN BLOOD BY AUTOMATED COUNT: 13.4 % (ref 11.5–14.5)
GLUCOSE SERPL-MCNC: 86 MG/DL (ref 65–100)
HCT VFR BLD AUTO: 44 % (ref 35–47)
HGB BLD-MCNC: 15 G/DL (ref 11.5–16)
MCH RBC QN AUTO: 32.9 PG (ref 26–34)
MCHC RBC AUTO-ENTMCNC: 34.1 G/DL (ref 30–36.5)
MCV RBC AUTO: 96.5 FL (ref 80–99)
NRBC # BLD: 0 K/UL (ref 0–0.01)
NRBC BLD-RTO: 0 PER 100 WBC
PLATELET # BLD AUTO: 309 K/UL (ref 150–400)
PMV BLD AUTO: 10.5 FL (ref 8.9–12.9)
POTASSIUM SERPL-SCNC: 3.9 MMOL/L (ref 3.5–5.1)
RBC # BLD AUTO: 4.56 M/UL (ref 3.8–5.2)
SODIUM SERPL-SCNC: 138 MMOL/L (ref 136–145)
WBC # BLD AUTO: 12 K/UL (ref 3.6–11)

## 2020-06-19 PROCEDURE — 76642 ULTRASOUND BREAST LIMITED: CPT

## 2020-06-19 PROCEDURE — 36415 COLL VENOUS BLD VENIPUNCTURE: CPT

## 2020-06-19 PROCEDURE — 96365 THER/PROPH/DIAG IV INF INIT: CPT

## 2020-06-19 PROCEDURE — 99284 EMERGENCY DEPT VISIT MOD MDM: CPT

## 2020-06-19 PROCEDURE — 74011000258 HC RX REV CODE- 258: Performed by: EMERGENCY MEDICINE

## 2020-06-19 PROCEDURE — 96375 TX/PRO/DX INJ NEW DRUG ADDON: CPT

## 2020-06-19 PROCEDURE — 80048 BASIC METABOLIC PNL TOTAL CA: CPT

## 2020-06-19 PROCEDURE — 74011250636 HC RX REV CODE- 250/636: Performed by: EMERGENCY MEDICINE

## 2020-06-19 PROCEDURE — 85027 COMPLETE CBC AUTOMATED: CPT

## 2020-06-19 RX ORDER — KETOROLAC TROMETHAMINE 30 MG/ML
15 INJECTION, SOLUTION INTRAMUSCULAR; INTRAVENOUS ONCE
Status: COMPLETED | OUTPATIENT
Start: 2020-06-19 | End: 2020-06-19

## 2020-06-19 RX ORDER — HYDROCODONE BITARTRATE AND ACETAMINOPHEN 5; 325 MG/1; MG/1
1 TABLET ORAL
Qty: 15 TAB | Refills: 0 | Status: SHIPPED | OUTPATIENT
Start: 2020-06-19 | End: 2020-06-19 | Stop reason: SDUPTHER

## 2020-06-19 RX ORDER — SULFAMETHOXAZOLE AND TRIMETHOPRIM 800; 160 MG/1; MG/1
1 TABLET ORAL 2 TIMES DAILY
Qty: 20 TAB | Refills: 0 | Status: SHIPPED | OUTPATIENT
Start: 2020-06-19 | End: 2020-06-29

## 2020-06-19 RX ORDER — IBUPROFEN 800 MG/1
800 TABLET ORAL
Qty: 30 TAB | Refills: 0 | Status: SHIPPED | OUTPATIENT
Start: 2020-06-19 | End: 2021-06-30

## 2020-06-19 RX ORDER — LIDOCAINE HYDROCHLORIDE AND EPINEPHRINE 10; 10 MG/ML; UG/ML
10 INJECTION, SOLUTION INFILTRATION; PERINEURAL ONCE
Qty: 1 VIAL | Refills: 0
Start: 2020-06-19 | End: 2020-06-19

## 2020-06-19 RX ORDER — HYDROCODONE BITARTRATE AND ACETAMINOPHEN 5; 325 MG/1; MG/1
1 TABLET ORAL
Qty: 15 TAB | Refills: 0 | Status: SHIPPED | OUTPATIENT
Start: 2020-06-19 | End: 2020-06-24

## 2020-06-19 RX ORDER — CEPHALEXIN 500 MG/1
500 CAPSULE ORAL 4 TIMES DAILY
Qty: 28 CAP | Refills: 0 | Status: SHIPPED | OUTPATIENT
Start: 2020-06-19 | End: 2020-06-26

## 2020-06-19 RX ADMIN — KETOROLAC TROMETHAMINE 15 MG: 30 INJECTION, SOLUTION INTRAMUSCULAR at 11:30

## 2020-06-19 RX ADMIN — SODIUM CHLORIDE 3 G: 900 INJECTION, SOLUTION INTRAVENOUS at 12:12

## 2020-06-19 NOTE — PROGRESS NOTES
HISTORY OF PRESENT ILLNESS  Miranda Chavez is a 44 y.o. female who comes in for consultation by Rosalinda Torres MD for a breast mass  HPI  She developed right breast pain, redness, and swelling near the nipple a week ago. She has had a little drainage as well. She went to the ER today and an US suggested a 2.0 x 1.8 x 1.3 cm complex mass near the nipple. She had a similar episode of presumed mastitis a year ago which resolved with antibiotics. Mammogram and US last year were ok. She denies fever, chills, or sweats. She had menarche at 15, first of two pregnancies at 32 and had a LAVH/USO in 2019 and is taking estrogen. She has a strong fam hx breast cancer (MA  at 48, MGM diet at 54, MA alive dx 39), PU with pancreatic cancer. Past Medical History:   Diagnosis Date    Other ill-defined conditions(061.28)     ovarian cyst     Psychiatric disorder     suicide attempt, depression      Past Surgical History:   Procedure Laterality Date    HX APPENDECTOMY          HX GYN      BTL    HX GYN      tubes tied 10/2008    HX HYSTERECTOMY       Family History   Problem Relation Age of Onset    Heart Attack Mother     Hypertension Father     No Known Problems Sister     Cancer Maternal Aunt     Breast Cancer Maternal Aunt     No Known Problems Maternal Uncle     No Known Problems Paternal Aunt     Cancer Paternal Uncle         testicular cancer    Cancer Maternal Grandmother         lung/leukemia    Breast Cancer Maternal Grandmother     Diabetes Maternal Grandfather     No Known Problems Paternal Grandmother     Stroke Paternal Grandfather     Asthma Son     Breast Cancer Maternal Aunt      Social History     Tobacco Use    Smoking status: Current Every Day Smoker     Packs/day: 1.00     Years: 15.00     Pack years: 15.00     Types: Cigarettes    Smokeless tobacco: Never Used   Substance Use Topics    Alcohol use:  Yes     Alcohol/week: 1.0 standard drinks     Types: 1 Cans of beer per week     Comment: occasional-weekends    Drug use: Never     Current Outpatient Medications   Medication Sig    ibuprofen (MOTRIN) 800 mg tablet Take 1 Tab by mouth every eight (8) hours as needed for Pain.  trimethoprim-sulfamethoxazole (BACTRIM DS, SEPTRA DS) 160-800 mg per tablet Take 1 Tab by mouth two (2) times a day for 10 days.  HYDROcodone-acetaminophen (NORCO) 5-325 mg per tablet Take 1 Tab by mouth every six (6) hours as needed for Pain for up to 5 days. Max Daily Amount: 4 Tabs.  estrogens, conjugated (CONJUGATED ESTROGENS PO) Take 1 mg by mouth daily.  cephALEXin (Keflex) 500 mg capsule Take 1 Cap by mouth four (4) times daily for 7 days.  benzonatate (TESSALON) 200 mg capsule Take 1 Cap by mouth three (3) times daily as needed for Cough. No current facility-administered medications for this visit. Allergies   Allergen Reactions    Beef Containing Products Other (comments)     Gi distress     Codeine Hives     Review of Systems   Constitutional: Negative for chills, diaphoresis, fever and weight loss. HENT: Negative for sore throat. Eyes: Negative for blurred vision and discharge. Respiratory: Negative for cough, shortness of breath and wheezing. Cardiovascular: Negative for chest pain, palpitations, orthopnea, claudication and leg swelling. Gastrointestinal: Negative for abdominal pain, constipation, diarrhea, heartburn, melena, nausea and vomiting. Genitourinary: Negative for dysuria, flank pain, frequency and hematuria. Musculoskeletal: Negative for back pain, joint pain, myalgias and neck pain. Skin: Negative for rash. Neurological: Negative for dizziness, speech change, focal weakness, seizures, loss of consciousness, weakness and headaches. Endo/Heme/Allergies: Does not bruise/bleed easily. Psychiatric/Behavioral: Negative for depression and memory loss.      Visit Vitals  /86 (BP Patient Position: Sitting)   Pulse 96   Temp 97.7 °F (36.5 °C) (Oral)   Resp 18   Ht 5' 7\" (1.702 m)   Wt 77.8 kg (171 lb 8 oz)   LMP 05/18/2019 (Approximate)   SpO2 97%   BMI 26.86 kg/m²       Physical Exam  Exam conducted with a chaperone present. Constitutional:       General: She is not in acute distress. Appearance: She is well-developed. She is not diaphoretic. HENT:      Head: Normocephalic and atraumatic. Mouth/Throat:      Pharynx: No oropharyngeal exudate. Eyes:      General: No scleral icterus. Conjunctiva/sclera: Conjunctivae normal.      Pupils: Pupils are equal, round, and reactive to light. Neck:      Musculoskeletal: Normal range of motion and neck supple. Thyroid: No thyromegaly. Vascular: No JVD. Trachea: No tracheal deviation. Cardiovascular:      Rate and Rhythm: Normal rate and regular rhythm. Heart sounds: No murmur. No friction rub. No gallop. Pulmonary:      Effort: Pulmonary effort is normal. No respiratory distress. Breath sounds: Normal breath sounds. No wheezing or rales. Chest:      Breasts:         Right: Mass, nipple discharge, skin change and tenderness present. No inverted nipple. Left: No inverted nipple, mass, nipple discharge, skin change or tenderness. Abdominal:      General: Bowel sounds are normal. There is no distension. Palpations: Abdomen is soft. There is no mass. Tenderness: There is no abdominal tenderness. There is no guarding or rebound. Musculoskeletal: Normal range of motion. Lymphadenopathy:      Cervical: No cervical adenopathy. Upper Body:      Right upper body: No supraclavicular, axillary or pectoral adenopathy. Left upper body: No supraclavicular, axillary or pectoral adenopathy. Skin:     General: Skin is warm and dry. Coloration: Skin is not pale. Findings: No erythema or rash. Neurological:      Mental Status: She is alert and oriented to person, place, and time. Cranial Nerves: No cranial nerve deficit. Psychiatric:         Behavior: Behavior normal.         Thought Content: Thought content normal.         Judgment: Judgment normal.       I reviewed her US images from 6/19/2020    ASSESSMENT and PLAN  1. Right breast periareolar mastitis with possible mass. I explained to her about the anatomy and pathophysiology of the process and options for treatment with antibiotics/FNA and incision and drainage. Risks, benefits and limitations of each were discussed. After lengthy discussion, she desire FNA of the area and I did that today.   I got out cloudy white fluid and sent it for culture  Rx for bactrim as well as hydrocodone (#15)  She will plan on taking antibiotics, using warm compresses and come back to see me in 2-3 days  If not improving will plan surgical incision and drainage    Chadwick Puga MD FACS

## 2020-06-19 NOTE — H&P (VIEW-ONLY)
HISTORY OF PRESENT ILLNESS Abdoulaye Altman is a 44 y.o. female who comes in for consultation by Melo Justice MD for a breast mass HPI She developed right breast pain, redness, and swelling near the nipple a week ago. She has had a little drainage as well. She went to the ER today and an US suggested a 2.0 x 1.8 x 1.3 cm complex mass near the nipple. She had a similar episode of presumed mastitis a year ago which resolved with antibiotics. Mammogram and US last year were ok. She denies fever, chills, or sweats. She had menarche at 15, first of two pregnancies at 32 and had a LAVH/USO in 2019 and is taking estrogen. She has a strong fam hx breast cancer (MA  at 48, MGM diet at 54, MA alive dx 39), PU with pancreatic cancer. Past Medical History:  
Diagnosis Date  Other ill-defined conditions(830.93)   
 ovarian cyst   
 Psychiatric disorder   
 suicide attempt, depression Past Surgical History:  
Procedure Laterality Date  [facility]  HX GYN    
 BTL  
 HX GYN    
 tubes tied 10/2008  HX HYSTERECTOMY Family History Problem Relation Age of Onset  Heart Attack Mother  Hypertension Father  No Known Problems Sister  Cancer Maternal Aunt  Breast Cancer Maternal Aunt  No Known Problems Maternal Uncle  No Known Problems Paternal Aunt  Cancer Paternal Uncle   
     testicular cancer  Cancer Maternal Grandmother   
     lung/leukemia  Breast Cancer Maternal Grandmother  Diabetes Maternal Grandfather  No Known Problems Paternal Grandmother  Stroke Paternal Grandfather  Asthma Son  Breast Cancer Maternal Aunt Social History Tobacco Use  Smoking status: Current Every Day Smoker Packs/day: 1.00 Years: 15.00 Pack years: 15.00 Types: Cigarettes  Smokeless tobacco: Never Used Substance Use Topics  Alcohol use: Yes Alcohol/week: 1.0 standard drinks Types: 1 Cans of beer per week Comment: occasional-weekends  Drug use: Never Current Outpatient Medications Medication Sig  ibuprofen (MOTRIN) 800 mg tablet Take 1 Tab by mouth every eight (8) hours as needed for Pain.  trimethoprim-sulfamethoxazole (BACTRIM DS, SEPTRA DS) 160-800 mg per tablet Take 1 Tab by mouth two (2) times a day for 10 days.  HYDROcodone-acetaminophen (NORCO) 5-325 mg per tablet Take 1 Tab by mouth every six (6) hours as needed for Pain for up to 5 days. Max Daily Amount: 4 Tabs.  estrogens, conjugated (CONJUGATED ESTROGENS PO) Take 1 mg by mouth daily.  cephALEXin (Keflex) 500 mg capsule Take 1 Cap by mouth four (4) times daily for 7 days.  benzonatate (TESSALON) 200 mg capsule Take 1 Cap by mouth three (3) times daily as needed for Cough. No current facility-administered medications for this visit. Allergies Allergen Reactions  Beef Containing Products Other (comments) Gi distress  Codeine Hives Review of Systems Constitutional: Negative for chills, diaphoresis, fever and weight loss. HENT: Negative for sore throat. Eyes: Negative for blurred vision and discharge. Respiratory: Negative for cough, shortness of breath and wheezing. Cardiovascular: Negative for chest pain, palpitations, orthopnea, claudication and leg swelling. Gastrointestinal: Negative for abdominal pain, constipation, diarrhea, heartburn, melena, nausea and vomiting. Genitourinary: Negative for dysuria, flank pain, frequency and hematuria. Musculoskeletal: Negative for back pain, joint pain, myalgias and neck pain. Skin: Negative for rash. Neurological: Negative for dizziness, speech change, focal weakness, seizures, loss of consciousness, weakness and headaches. Endo/Heme/Allergies: Does not bruise/bleed easily. Psychiatric/Behavioral: Negative for depression and memory loss. Visit Vitals /86 (BP Patient Position: Sitting) Pulse 96 Temp 97.7 °F (36.5 °C) (Oral) Resp 18 Ht 5' 7\" (1.702 m) Wt 77.8 kg (171 lb 8 oz) LMP 05/18/2019 (Approximate) SpO2 97% BMI 26.86 kg/m² Physical Exam 
Exam conducted with a chaperone present. Constitutional:   
   General: She is not in acute distress. Appearance: She is well-developed. She is not diaphoretic. HENT:  
   Head: Normocephalic and atraumatic. Mouth/Throat:  
   Pharynx: No oropharyngeal exudate. Eyes:  
   General: No scleral icterus. Conjunctiva/sclera: Conjunctivae normal.  
   Pupils: Pupils are equal, round, and reactive to light. Neck: Musculoskeletal: Normal range of motion and neck supple. Thyroid: No thyromegaly. Vascular: No JVD. Trachea: No tracheal deviation. Cardiovascular:  
   Rate and Rhythm: Normal rate and regular rhythm. Heart sounds: No murmur. No friction rub. No gallop. Pulmonary:  
   Effort: Pulmonary effort is normal. No respiratory distress. Breath sounds: Normal breath sounds. No wheezing or rales. Chest:  
   Breasts:  
      Right: Mass, nipple discharge, skin change and tenderness present. No inverted nipple. Left: No inverted nipple, mass, nipple discharge, skin change or tenderness. Abdominal:  
   General: Bowel sounds are normal. There is no distension. Palpations: Abdomen is soft. There is no mass. Tenderness: There is no abdominal tenderness. There is no guarding or rebound. Musculoskeletal: Normal range of motion. Lymphadenopathy:  
   Cervical: No cervical adenopathy. Upper Body:  
   Right upper body: No supraclavicular, axillary or pectoral adenopathy. Left upper body: No supraclavicular, axillary or pectoral adenopathy. Skin: 
   General: Skin is warm and dry. Coloration: Skin is not pale. Findings: No erythema or rash. Neurological:  
   Mental Status: She is alert and oriented to person, place, and time. Cranial Nerves: No cranial nerve deficit. Psychiatric:     
   Behavior: Behavior normal.     
   Thought Content: Thought content normal.     
   Judgment: Judgment normal.  
 
 
I reviewed her US images from 6/19/2020 ASSESSMENT and PLAN 1. Right breast periareolar mastitis with possible mass. I explained to her about the anatomy and pathophysiology of the process and options for treatment with antibiotics/FNA and incision and drainage. Risks, benefits and limitations of each were discussed. After lengthy discussion, she desire FNA of the area and I did that today. I got out cloudy white fluid and sent it for culture Rx for bactrim as well as hydrocodone (#15) She will plan on taking antibiotics, using warm compresses and come back to see me in 2-3 days If not improving will plan surgical incision and drainage Luci Esteban MD FACS

## 2020-06-19 NOTE — PROGRESS NOTES
[facility]  OFFICE PROCEDURE PROGRESS NOTE        Chart reviewed for the following:   Dianna MALAVE, have reviewed the History, Physical and updated the Allergic reactions for [facility] performed immediately prior to start of procedure:   Dianna MALAVE, have performed the following reviews on Serge Kim prior to the start of the procedure:            * Patient was identified by name and date of birth   * Agreement on procedure being performed was verified  * Risks and Benefits explained to the patient  * Procedure site verified and marked as necessary  * Patient was positioned for comfort  * Consent was signed and verified     Time: 3:15 pm      Date of procedure: 6/19/2020    Procedure performed by:  Tila Olivo MD    Provider assisted by: none     Patient assisted by: self    How tolerated by patient: tolerated the procedure well with no complications    Post Procedural Pain Scale: 4 - Hurts [place] More    Comments: none

## 2020-06-19 NOTE — ED NOTES
Pt found wandering in [place]. RN redirected to room and placed back on monitor, call bell was in reach of pt. Pt expressed increased anxiety and burning in head. RN provided education on fall risk of ambulating with out assistance. MD notified additional orders placed.

## 2020-06-19 NOTE — LETTER
NOTIFICATION OF RETURN TO WORK / SCHOOL 
 
6/19/2020 3:36 PM 
 
Ms. Torres Brunner [address]  [address] [place] [id number] Deirdre Anderson To Whom It May Concern: Torres Brunner was under the care of [address] [place], [place] [address]saira  from 06/19/2020 to present. She will be able to return to work on 06/23/2020 with no restrictions. If there are questions or concerns please have the patient contact our office.  
 
Sincerely, 
 
 
Sisi Aguilar MD

## 2020-06-19 NOTE — PROGRESS NOTES
Procedure Note    Pre Procedure Diagnosis:  Right mastitis with abscess  Post Procedure Diagnosis:  Right mastitis with abscess  Procedure:  1. Fine needle aspiration of right breast mastitis with abscess  Surgeon:   Yogi Haji MD FACS  Anesthesia   1% lidocaine  EBL minimal  Culture:  Right breast abscess    Procedure:    After informed consent and time out, I prepped the right breast with chlorhexidine. I next injected 2 ml of lidocaine into the skin and surrounding tissues. I then utilized an 18 gauge needle and aspirated 4 ml cloudy thick fluid. She tolerated it ok.         Signed  Yogi Haji MD FACS

## 2020-06-19 NOTE — ED PROVIDER NOTES
EMERGENCY DEPARTMENT HISTORY AND PHYSICAL EXAM      Date: 6/19/2020  Patient Name: Karlo Finney    History of Presenting Illness     Chief Complaint   Patient presents with    Abscess     Ambulatory into the ED with c/o abscess to Rt breast x 1 week. History Provided By: Patient    HPI: Elder Record, 44 y.o. female  presents to the ED with cc of painful and red right breast.  Patient states that she has had symptoms for about 1 week. There is been pain and swelling under the right nipple. Patient was seen by her OB/GYN and referred to a breast surgeon. She has not seen the surgeon but comes into the ER complaining of worsening pain. She states there is some mild discharge from the nipple. She denies fevers. She states the breast is tender to touch and has been very red warm and inflamed. She is not breast-feeding nor had any piercings. Past History     Past Medical History:  Past Medical History:   Diagnosis Date    Other ill-defined conditions(799.89)     ovarian cyst     Psychiatric disorder     suicide attempt, depression        Past Surgical History:  Past Surgical History:   Procedure Laterality Date    HX APPENDECTOMY      1999    HX GYN      BTL    HX GYN      tubes tied 10/2008    HX HYSTERECTOMY         Medications:  No current facility-administered medications on file prior to encounter. Current Outpatient Medications on File Prior to Encounter   Medication Sig Dispense Refill    benzonatate (TESSALON) 200 mg capsule Take 1 Cap by mouth three (3) times daily as needed for Cough. 30 Cap 0    estrogens, conjugated (CONJUGATED ESTROGENS PO) Take 1 mg by mouth daily.          Family History:  Family History   Problem Relation Age of Onset    Heart Attack Mother     Hypertension Father     No Known Problems Sister     Cancer Maternal Aunt     Breast Cancer Maternal Aunt     No Known Problems Maternal Uncle     No Known Problems Paternal Aunt     Cancer Paternal Uncle testicular cancer    Cancer Maternal Grandmother         lung/leukemia    Breast Cancer Maternal Grandmother     Diabetes Maternal Grandfather     No Known Problems Paternal Grandmother     Stroke Paternal Grandfather     Asthma Son     Breast Cancer Maternal Aunt        Social History:  Social History     Tobacco Use    Smoking status: Current Every Day Smoker     Packs/day: 1.00     Years: 15.00     Pack years: 15.00     Types: Cigarettes    Smokeless tobacco: Never Used   Substance Use Topics    Alcohol use: Yes     Alcohol/week: 1.0 standard drinks     Types: 1 Cans of beer per week     Comment: occasional-weekends    Drug use: Never       Allergies: Allergies   Allergen Reactions    Beef Containing Products Other (comments)     Gi distress     Codeine Hives       All the above components of the past  history are auto-populated from the electronic record. They have been reviewed and the patient has been interviewed for any pertinent past history that pertains to the patient's chief complaint and reason for visit. Not all pre-populated components may be accurate at the time this note was generated. Review of Systems   Review of Systems   Constitutional: Negative for chills and fever. HENT: Negative for congestion, ear pain, rhinorrhea, sore throat and trouble swallowing. Eyes: Negative for visual disturbance. Respiratory: Negative for cough, chest tightness and shortness of breath. Cardiovascular: Negative for chest pain and palpitations. Gastrointestinal: Negative for abdominal pain, blood in stool, constipation, diarrhea, nausea and vomiting. Genitourinary: Negative for decreased urine volume, difficulty urinating, dysuria and frequency. Musculoskeletal: Negative for back pain and neck pain. Skin: Positive for color change. Negative for rash.         Right breast swelling, redness, warmth, nipple swelling   Neurological: Negative for dizziness, weakness, light-headedness and headaches. Physical Exam   Physical Exam  Vitals signs and nursing note reviewed. Exam conducted with a chaperone present. Constitutional:       General: She is not in acute distress. Appearance: She is well-developed. She is not ill-appearing. Eyes:      Conjunctiva/sclera: Conjunctivae normal.   Neck:      Musculoskeletal: Neck supple. Cardiovascular:      Rate and Rhythm: Normal rate and regular rhythm. Pulmonary:      Effort: Pulmonary effort is normal. No accessory muscle usage or respiratory distress. Breath sounds: Normal breath sounds. Chest:      Breasts:         Right: Swelling, skin change and tenderness present. Abdominal:      General: There is no distension. Palpations: Abdomen is soft. Tenderness: There is no abdominal tenderness. Lymphadenopathy:      Cervical: No cervical adenopathy. Skin:     General: Skin is warm and dry. Neurological:      Mental Status: She is alert and oriented to person, place, and time. Cranial Nerves: No cranial nerve deficit. Sensory: No sensory deficit.          Diagnostic Study Results     Labs -     Recent Results (from the past 24 hour(s))   CBC W/O DIFF    Collection Time: 06/19/20 10:58 AM   Result Value Ref Range    WBC 12.0 (H) 3.6 - 11.0 K/uL    RBC 4.56 3.80 - 5.20 M/uL    HGB 15.0 11.5 - 16.0 g/dL    HCT 44.0 35.0 - 47.0 %    MCV 96.5 80.0 - 99.0 FL    MCH 32.9 26.0 - 34.0 PG    MCHC 34.1 30.0 - 36.5 g/dL    RDW 13.4 11.5 - 14.5 %    PLATELET 444 267 - 659 K/uL    MPV 10.5 8.9 - 12.9 FL    NRBC 0.0 0  WBC    ABSOLUTE NRBC 0.00 0.00 - 0.46 K/uL   METABOLIC PANEL, BASIC    Collection Time: 06/19/20 10:58 AM   Result Value Ref Range    Sodium 138 136 - 145 mmol/L    Potassium 3.9 3.5 - 5.1 mmol/L    Chloride 108 97 - 108 mmol/L    CO2 27 21 - 32 mmol/L    Anion gap 3 (L) 5 - 15 mmol/L    Glucose 86 65 - 100 mg/dL    BUN 11 6 - 20 MG/DL    Creatinine 0.77 0.55 - 1.02 MG/DL    BUN/Creatinine ratio 14 12 - 20 GFR est AA >60 >60 ml/min/1.73m2    GFR est non-AA >60 >60 ml/min/1.73m2    Calcium 8.0 (L) 8.5 - 10.1 MG/DL       Radiologic Studies -   US BREAST RT LIMITED=<3 QUAD   Final Result   IMPRESSION: Complex right breast mass adjacent to the nipple. This may represent   an abscess and therefore strong clinical correlation is recommended. Follow-up   in the women's imaging Center is recommended after treatment. 1. BI-RADS category 0, incomplete. 2. The patient should return for additional evaluation of both breasts. 3. She will be informed by the emergency room staff. CT Results  (Last 48 hours)    None        CXR Results  (Last 48 hours)    None            Medical Decision Making     I reviewed the vital signs, available nursing notes, past medical history, past surgical history, family history and social history. Vital Signs-Reviewed the patient's vital signs. Patient Vitals for the past 24 hrs:   Temp Pulse Resp BP SpO2   06/19/20 1132   15 120/90    06/19/20 1011 97.7 °F (36.5 °C) 100 13 (!) 134/91 98 %         Records Reviewed: Nursing Notes and Old Medical Records    Provider Notes (Medical Decision Making):   Patient presents with a mass and likely abscess in the right breast.  She does have a elevated white blood cell count. She is not febrile or septic. Ultrasound does show a right breast mass that is likely to be an abscess. The mass is deeper than the dermal surface and unable to be drained in the emergency department. I will place the patient on antibiotics and recommend follow-up with a breast surgeon. ED Course:   Initial assessment performed. The patients presenting problems have been discussed, and they are in agreement with the care plan formulated and outlined with them. I have encouraged them to ask questions as they arise throughout their visit.          Orders Placed This Encounter    US BREAST RT LIMITED=<3 QUAD    CBC W/O DIFF    BASIC METABOLIC PANEL    SALINE LOCK IV ONE TIME STAT    ampicillin-sulbactam (UNASYN) 3 g in 0.9% sodium chloride (MBP/ADV) 100 mL    ketorolac (TORADOL) injection 15 mg    cephALEXin (Keflex) 500 mg capsule    ibuprofen (MOTRIN) 800 mg tablet       Procedures      Critical Care Time:   0    Disposition:  Discharge  12:42 PM    The patient's emergency department evaluation is now complete. I have reviewed all labs, imaging, and pertinent information. I have discussed all results with the patient and/or family. Based on our evaluation today I do believe that the patient is safe to be discharged home. The patient has been provided with at home instructions that are pertinent to their complaint today, although these may not be specific to the exact diagnosis. I have reviewed the patient's home medications and attempted to reconcile if not already done so by pharmacy or nursing staff. I have discussed all new prescriptions with the patient. The patient has been encouraged to follow-up with primary care doctor and/or specialist, and these have been discussed with the patient. The patient has been advised that they may return to the emergency department if they have any worsening symptoms and or new symptoms that are of concern to them. Verbal discharge instructions may have also been provided to the patient that may not be specifically contained in the written discharge instructions. The patient has been given opportunity to ask questions prior to discharge. PLAN:  1. Current Discharge Medication List      START taking these medications    Details   cephALEXin (Keflex) 500 mg capsule Take 1 Cap by mouth four (4) times daily for 7 days. Qty: 28 Cap, Refills: 0      ibuprofen (MOTRIN) 800 mg tablet Take 1 Tab by mouth every eight (8) hours as needed for Pain. Qty: 30 Tab, Refills: 0           2.    Follow-up Information     Follow up With Specialties Details Why Contact Info    Damian Noble MD General Surgery, Breast Surgery, Endocrinology, Colon and Rectal Surgery Schedule an appointment as soon as possible for a visit   [address] [place] [address] [address]  [phone]          Return to ED if worse     Diagnosis     Clinical Impression:   1. Abscess of breast, right    2. Breast mass, right            This note will not be viewable in MyChart.

## 2020-06-19 NOTE — PROGRESS NOTES
1. Have you been to the ER, urgent care clinic since your last visit? Hospitalized since your last visit? Yes, today breast pain    2. Have you seen or consulted any other health care providers outside of the [address] [place] [address] since your last visit? Include any pap smears or colon screening.  No

## 2020-06-19 NOTE — DISCHARGE INSTRUCTIONS
Thank you for visiting our emergency department today. We all do hope that we were able to assist you in your emergent needs today. Please read over your discharge instructions as these contain pertinent information to help you in the healing process. These instructions include a list of prescriptions you were given today. Follow-up information is also noted on your discharge papers. There are attached instructions and information pertaining to the reason why you were seen in the emergency department today. These discharge instructions may not be for exactly why you were here, but may be the closest available instructions that we have. These include important advice for things that you can do at home to feel better, and reasons to return to the emergency department. The evaluation and treatment you received in the emergency department is not always definitive care. If follow-up with your primary care doctor or specialist was recommended, it is important that you make these appointments for follow-up care. You may need further testing, procedures, and/or medications to help you feel better. Further tests may be required that are not available in the emergency department. Failure to make these follow-up appointments may jeopardize your health. The emergency department is here for emergent stabilization and evaluation of life and limb threatening illness and/or injuries. Further care through a specialist or primary care doctor may be required to assist in your healing. Lab Results         Contains abnormal data CBC W/O DIFF (Final result)    Component (Lab Inquiry)   Collection Time Result Time WBC RBC HGB HCT MCV MCH MCHC RDW PLT MPLV   06/19/20 10:58:00 06/19/20 11:07:54 12. 0High  4.56 15.0 44.0 96.5 32.9 34.1 13.4 309 10.5   Previous Results   06/25/18 17:17:00 06/25/18 17:48:38 11.7  Due to mathematical ro. Bula Dayhoff Bula Dayhoff High  4.48 15.1 43.7 97.5 33.7 34.6 13.5 286 10.7   08/02/17 13:15:00 08/03/17 05:38:00 9.8 5.05 16. 0High  48. 1High  95 31.7 33.3 14.4 266    11/23/15 12:18:00 11/24/15 02:35:00 9.9 4.76 15.5 45.7 96 32.6 33.9 13.4 362    10/28/15 10:47:00 10/29/15 05:42:00 19. 6High  4.86 15.7 45.7 94 32.3 34.4 13.5 317    02/24/12 06:00:00 02/24/12 06:50:17 7.1 4.02 13.2 38.9 96.8 32.8 33.9 13.7 216        Collection Time Result Time NRBC ANRBC   06/19/20 10:58:00 06/19/20 11:07:54 0.0 0.00   Previous Results   06/25/18 17:17:00 06/25/18 17:48:38 0.0 0.00   08/02/17 13:15:00 08/03/17 05:38:00     11/23/15 12:18:00 11/24/15 02:35:00     10/28/15 10:47:00 10/29/15 05:42:00     02/24/12 06:00:00 02/24/12 06:50:17         Final result                        Contains abnormal data METABOLIC PANEL, BASIC (Final result)    Component (Lab Inquiry)   Collection Time Result Time NA K CL CO2 AGAP GLU BUN CREA BUCR GFRAA   06/19/20 10:58:00 06/19/20 11:46:53 138 3.9 108 27 3Low  86 11 0.77 14 >60   Previous Results   06/25/18 17:17:00 06/25/18 18:25:16 140 3.6 106 26 8 97 8 0.84 10Low  >60   08/02/17 13:15:00 08/03/17 05:38:00 137 4.4 100 20  80 10 0.81 12 108   11/23/15 12:18:00 11/24/15 02:35:00 138 3.9 99 25  81 5Low  0.66 8 132   10/28/15 10:47:00 10/29/15 05:42:00 138 3.9 101 21  87 8 0.70 11 130   02/24/12 06:00:00 02/24/12 07:08:02 142 3.9 111High  28 3Low  75 5Low  0.8 6Low  >60       Collection Time Result Time GFRNA CA   06/19/20 10:58:00 06/19/20 11:46:53 >60  Estimated GFR is calcu. .. 8.0Low    Previous Results   06/25/18 17:17:00 06/25/18 18:25:16 >60  Estimated GFR is calcu. .. 8.7   08/02/17 13:15:00 08/03/17 05:38:00 94 9.1   11/23/15 12:18:00 11/24/15 02:35:00 115 9.3   10/28/15 10:47:00 10/29/15 05:42:00 113 9.1   02/24/12 06:00:00 02/24/12 07:08:02 >60 7.9Low        Final result                    ECG Results     None   PACS Images      Show images for US BREAST RT LIMITED=<3 QUAD   Imaging Results         US BREAST RT LIMITED=<3 QUAD (Final result)   Result time 06/19/20 12:00:52   Procedure changed from [address] BREAST AXILLA RT   Final result by Dora Dubose MD (06/19/20 12:00:52)                Impression:    IMPRESSION: Complex right breast mass adjacent to the nipple. This may represent  an abscess and therefore strong clinical correlation is recommended. Follow-up  in the women's imaging Center is recommended after treatment. 1. BI-RADS category 0, incomplete. 2. The patient should return for additional evaluation of both breasts. 3. She will be informed by the emergency room staff. Narrative:    History: Right breast erythema and line. Ultrasonography of the right breast adjacent to the nipple demonstrates a 2.0 x  1.8 x 1.3 cm complex mass subjacent to the nipple.

## 2020-06-19 NOTE — LETTER
6/19/20 Patient: Maria Teresa Garcia YOB: 1980 Date of Visit: 6/19/2020 Keely Perla, [address] 
[address] [address] [address] [address] VIA In Basket Dear Keely Perla MD, Thank you for referring Ms. Bethel Hager to [address] [place] [address] for evaluation. My notes for this consultation are attached. If you have questions, please do not hesitate to call me. I look forward to following your patient along with you.  
 
 
Sincerely, 
 
Ani Chang MD

## 2020-06-19 NOTE — LETTER
[address] 
[facility] EMERGENCY DEPT 
[address] [place] [id number] [phone] Work/School Note Date: 6/19/2020 To Whom It May concern: Kevyn Hoffmann was seen and treated today in the emergency room by the following provider(s): 
Attending Provider: Jj Erickson may return to work on 6/22/2020 Alireza Bynum Sincerely, 
 
 
 
 
Moises Villatoro RN

## 2020-06-19 NOTE — ED NOTES
RN answered pt call bell. Discussed additional coping mechanisms with pt for anxiety. Notified MD of pt's continuing concerns.

## 2020-06-22 ENCOUNTER — TELEPHONE (OUTPATIENT)
Dept: SURGERY | Age: 40
End: 2020-06-22

## 2020-06-22 ENCOUNTER — HOSPITAL ENCOUNTER (OUTPATIENT)
Age: 40
Setting detail: OUTPATIENT SURGERY
Discharge: HOME | End: 2020-06-22
Attending: SURGERY | Admitting: SURGERY
Payer: COMMERCIAL

## 2020-06-22 ENCOUNTER — OFFICE VISIT (OUTPATIENT)
Dept: SURGERY | Age: 40
End: 2020-06-22

## 2020-06-22 ENCOUNTER — ANESTHESIA (OUTPATIENT)
Dept: SURGERY | Age: 40
End: 2020-06-22
Payer: COMMERCIAL

## 2020-06-22 ENCOUNTER — ANESTHESIA EVENT (OUTPATIENT)
Dept: SURGERY | Age: 40
End: 2020-06-22
Payer: COMMERCIAL

## 2020-06-22 VITALS
TEMPERATURE: 97.6 F | RESPIRATION RATE: 19 BRPM | DIASTOLIC BLOOD PRESSURE: 75 MMHG | OXYGEN SATURATION: 100 % | SYSTOLIC BLOOD PRESSURE: 116 MMHG | WEIGHT: 170 LBS | BODY MASS INDEX: 26.68 KG/M2 | HEIGHT: 67 IN | HEART RATE: 65 BPM

## 2020-06-22 VITALS
OXYGEN SATURATION: 95 % | SYSTOLIC BLOOD PRESSURE: 132 MMHG | TEMPERATURE: 98 F | BODY MASS INDEX: 26.92 KG/M2 | WEIGHT: 171.5 LBS | HEART RATE: 79 BPM | HEIGHT: 67 IN | RESPIRATION RATE: 18 BRPM | DIASTOLIC BLOOD PRESSURE: 73 MMHG

## 2020-06-22 DIAGNOSIS — N61.1 ABSCESS OF RIGHT BREAST: Primary | ICD-10-CM

## 2020-06-22 DIAGNOSIS — N61.0 MASTITIS, RIGHT, ACUTE: Primary | ICD-10-CM

## 2020-06-22 LAB
BACTERIA SPEC AEROBE CULT: ABNORMAL
BACTERIA SPEC AEROBE CULT: ABNORMAL

## 2020-06-22 PROCEDURE — 76210000034 HC AMBSU PH I REC 0.5 TO 1 HR: Performed by: SURGERY

## 2020-06-22 PROCEDURE — 76210000046 HC AMBSU PH II REC FIRST 0.5 HR: Performed by: SURGERY

## 2020-06-22 PROCEDURE — 74011000250 HC RX REV CODE- 250: Performed by: SURGERY

## 2020-06-22 PROCEDURE — 74011000250 HC RX REV CODE- 250: Performed by: NURSE ANESTHETIST, CERTIFIED REGISTERED

## 2020-06-22 PROCEDURE — 76060000073 HC AMB SURG ANES FIRST 0.5 HR: Performed by: SURGERY

## 2020-06-22 PROCEDURE — 77030011640 HC PAD GRND REM COVD -A: Performed by: SURGERY

## 2020-06-22 PROCEDURE — 87075 CULTR BACTERIA EXCEPT BLOOD: CPT

## 2020-06-22 PROCEDURE — 74011250636 HC RX REV CODE- 250/636: Performed by: SURGERY

## 2020-06-22 PROCEDURE — 88304 TISSUE EXAM BY PATHOLOGIST: CPT

## 2020-06-22 PROCEDURE — 76030000002 HC AMB SURG OR TIME FIRST 0.: Performed by: SURGERY

## 2020-06-22 PROCEDURE — 77030011265 HC ELECTRD BLD HEX COVD -A: Performed by: SURGERY

## 2020-06-22 PROCEDURE — 74011250636 HC RX REV CODE- 250/636

## 2020-06-22 PROCEDURE — 77030021352 HC CBL LD SYS DISP COVD -B: Performed by: SURGERY

## 2020-06-22 PROCEDURE — 77030018836 HC SOL IRR NACL ICUM -A: Performed by: SURGERY

## 2020-06-22 PROCEDURE — 77030031139 HC SUT VCRL2 J&J -A: Performed by: SURGERY

## 2020-06-22 PROCEDURE — 87205 SMEAR GRAM STAIN: CPT

## 2020-06-22 PROCEDURE — 87070 CULTURE OTHR SPECIMN AEROBIC: CPT

## 2020-06-22 PROCEDURE — 74011250636 HC RX REV CODE- 250/636: Performed by: ANESTHESIOLOGY

## 2020-06-22 PROCEDURE — 77030020143 HC AIRWY LARYN INTUB CGAS -A: Performed by: NURSE ANESTHETIST, CERTIFIED REGISTERED

## 2020-06-22 PROCEDURE — 74011250636 HC RX REV CODE- 250/636: Performed by: NURSE ANESTHETIST, CERTIFIED REGISTERED

## 2020-06-22 PROCEDURE — 87147 CULTURE TYPE IMMUNOLOGIC: CPT

## 2020-06-22 RX ORDER — OXYCODONE AND ACETAMINOPHEN 5; 325 MG/1; MG/1
1 TABLET ORAL
Qty: 15 TAB | Refills: 0 | Status: SHIPPED | OUTPATIENT
Start: 2020-06-22 | End: 2020-06-27

## 2020-06-22 RX ORDER — SODIUM CHLORIDE, SODIUM LACTATE, POTASSIUM CHLORIDE, CALCIUM CHLORIDE 600; 310; 30; 20 MG/100ML; MG/100ML; MG/100ML; MG/100ML
25 INJECTION, SOLUTION INTRAVENOUS CONTINUOUS
Status: DISCONTINUED | OUTPATIENT
Start: 2020-06-22 | End: 2020-06-22 | Stop reason: HOSPADM

## 2020-06-22 RX ORDER — SODIUM CHLORIDE 0.9 % (FLUSH) 0.9 %
5-40 SYRINGE (ML) INJECTION EVERY 8 HOURS
Status: DISCONTINUED | OUTPATIENT
Start: 2020-06-22 | End: 2020-06-22 | Stop reason: HOSPADM

## 2020-06-22 RX ORDER — FENTANYL CITRATE 50 UG/ML
100 INJECTION, SOLUTION INTRAMUSCULAR; INTRAVENOUS ONCE
Status: CANCELLED | OUTPATIENT
Start: 2020-06-22 | End: 2020-06-23

## 2020-06-22 RX ORDER — IBUPROFEN 800 MG/1
800 TABLET ORAL
Qty: 30 TAB | Refills: 0 | Status: SHIPPED | OUTPATIENT
Start: 2020-06-22 | End: 2021-06-17

## 2020-06-22 RX ORDER — LIDOCAINE HYDROCHLORIDE 10 MG/ML
0.1 INJECTION, SOLUTION EPIDURAL; INFILTRATION; INTRACAUDAL; PERINEURAL AS NEEDED
Status: DISCONTINUED | OUTPATIENT
Start: 2020-06-22 | End: 2020-06-22 | Stop reason: HOSPADM

## 2020-06-22 RX ORDER — LIDOCAINE HYDROCHLORIDE 20 MG/ML
INJECTION, SOLUTION EPIDURAL; INFILTRATION; INTRACAUDAL; PERINEURAL AS NEEDED
Status: DISCONTINUED | OUTPATIENT
Start: 2020-06-22 | End: 2020-06-22 | Stop reason: HOSPADM

## 2020-06-22 RX ORDER — FENTANYL CITRATE 50 UG/ML
INJECTION, SOLUTION INTRAMUSCULAR; INTRAVENOUS
Status: COMPLETED
Start: 2020-06-22 | End: 2020-06-22

## 2020-06-22 RX ORDER — KETOROLAC TROMETHAMINE 30 MG/ML
INJECTION, SOLUTION INTRAMUSCULAR; INTRAVENOUS
Status: COMPLETED
Start: 2020-06-22 | End: 2020-06-22

## 2020-06-22 RX ORDER — FENTANYL CITRATE 50 UG/ML
25 INJECTION, SOLUTION INTRAMUSCULAR; INTRAVENOUS
Status: COMPLETED | OUTPATIENT
Start: 2020-06-22 | End: 2020-06-22

## 2020-06-22 RX ORDER — SODIUM CHLORIDE 0.9 % (FLUSH) 0.9 %
5-40 SYRINGE (ML) INJECTION AS NEEDED
Status: DISCONTINUED | OUTPATIENT
Start: 2020-06-22 | End: 2020-06-22 | Stop reason: HOSPADM

## 2020-06-22 RX ORDER — BUPIVACAINE HYDROCHLORIDE AND EPINEPHRINE 5; 5 MG/ML; UG/ML
INJECTION, SOLUTION EPIDURAL; INTRACAUDAL; PERINEURAL AS NEEDED
Status: DISCONTINUED | OUTPATIENT
Start: 2020-06-22 | End: 2020-06-22 | Stop reason: HOSPADM

## 2020-06-22 RX ORDER — HYDROMORPHONE HYDROCHLORIDE 1 MG/ML
.2-.5 INJECTION, SOLUTION INTRAMUSCULAR; INTRAVENOUS; SUBCUTANEOUS ONCE
Status: DISCONTINUED | OUTPATIENT
Start: 2020-06-22 | End: 2020-06-22 | Stop reason: HOSPADM

## 2020-06-22 RX ORDER — FENTANYL CITRATE 50 UG/ML
50 INJECTION, SOLUTION INTRAMUSCULAR; INTRAVENOUS
Status: COMPLETED | OUTPATIENT
Start: 2020-06-22 | End: 2020-06-22

## 2020-06-22 RX ORDER — DIPHENHYDRAMINE HYDROCHLORIDE 50 MG/ML
12.5 INJECTION, SOLUTION INTRAMUSCULAR; INTRAVENOUS AS NEEDED
Status: DISCONTINUED | OUTPATIENT
Start: 2020-06-22 | End: 2020-06-22 | Stop reason: HOSPADM

## 2020-06-22 RX ORDER — KETOROLAC TROMETHAMINE 30 MG/ML
30 INJECTION, SOLUTION INTRAMUSCULAR; INTRAVENOUS
Status: COMPLETED | OUTPATIENT
Start: 2020-06-22 | End: 2020-06-22

## 2020-06-22 RX ORDER — DEXAMETHASONE SODIUM PHOSPHATE 4 MG/ML
INJECTION, SOLUTION INTRA-ARTICULAR; INTRALESIONAL; INTRAMUSCULAR; INTRAVENOUS; SOFT TISSUE AS NEEDED
Status: DISCONTINUED | OUTPATIENT
Start: 2020-06-22 | End: 2020-06-22 | Stop reason: HOSPADM

## 2020-06-22 RX ORDER — PROPOFOL 10 MG/ML
INJECTION, EMULSION INTRAVENOUS AS NEEDED
Status: DISCONTINUED | OUTPATIENT
Start: 2020-06-22 | End: 2020-06-22 | Stop reason: HOSPADM

## 2020-06-22 RX ORDER — MORPHINE SULFATE 10 MG/ML
2 INJECTION, SOLUTION INTRAMUSCULAR; INTRAVENOUS
Status: DISCONTINUED | OUTPATIENT
Start: 2020-06-22 | End: 2020-06-22 | Stop reason: HOSPADM

## 2020-06-22 RX ORDER — OXYCODONE AND ACETAMINOPHEN 5; 325 MG/1; MG/1
1 TABLET ORAL
Status: DISCONTINUED | OUTPATIENT
Start: 2020-06-22 | End: 2020-06-22 | Stop reason: HOSPADM

## 2020-06-22 RX ORDER — MIDAZOLAM HYDROCHLORIDE 1 MG/ML
INJECTION, SOLUTION INTRAMUSCULAR; INTRAVENOUS AS NEEDED
Status: DISCONTINUED | OUTPATIENT
Start: 2020-06-22 | End: 2020-06-22 | Stop reason: HOSPADM

## 2020-06-22 RX ORDER — FENTANYL CITRATE 50 UG/ML
INJECTION, SOLUTION INTRAMUSCULAR; INTRAVENOUS AS NEEDED
Status: DISCONTINUED | OUTPATIENT
Start: 2020-06-22 | End: 2020-06-22 | Stop reason: HOSPADM

## 2020-06-22 RX ORDER — ONDANSETRON 2 MG/ML
INJECTION INTRAMUSCULAR; INTRAVENOUS AS NEEDED
Status: DISCONTINUED | OUTPATIENT
Start: 2020-06-22 | End: 2020-06-22 | Stop reason: HOSPADM

## 2020-06-22 RX ADMIN — FENTANYL CITRATE 50 MCG: 50 INJECTION, SOLUTION INTRAMUSCULAR; INTRAVENOUS at 14:53

## 2020-06-22 RX ADMIN — FENTANYL CITRATE 25 MCG: 50 INJECTION, SOLUTION INTRAMUSCULAR; INTRAVENOUS at 16:50

## 2020-06-22 RX ADMIN — FENTANYL CITRATE 25 MCG: 50 INJECTION, SOLUTION INTRAMUSCULAR; INTRAVENOUS at 16:35

## 2020-06-22 RX ADMIN — ONDANSETRON HYDROCHLORIDE 4 MG: 2 INJECTION, SOLUTION INTRAMUSCULAR; INTRAVENOUS at 16:03

## 2020-06-22 RX ADMIN — WATER 2 G: 1 INJECTION INTRAMUSCULAR; INTRAVENOUS; SUBCUTANEOUS at 15:55

## 2020-06-22 RX ADMIN — SODIUM CHLORIDE, SODIUM LACTATE, POTASSIUM CHLORIDE, AND CALCIUM CHLORIDE 25 ML/HR: 600; 310; 30; 20 INJECTION, SOLUTION INTRAVENOUS at 14:06

## 2020-06-22 RX ADMIN — KETOROLAC TROMETHAMINE 30 MG: 30 INJECTION, SOLUTION INTRAMUSCULAR at 16:29

## 2020-06-22 RX ADMIN — FENTANYL CITRATE 100 MCG: 50 INJECTION, SOLUTION INTRAMUSCULAR; INTRAVENOUS at 15:58

## 2020-06-22 RX ADMIN — MIDAZOLAM HYDROCHLORIDE 2 MG: 1 INJECTION, SOLUTION INTRAMUSCULAR; INTRAVENOUS at 15:45

## 2020-06-22 RX ADMIN — LIDOCAINE HYDROCHLORIDE 80 MG: 20 INJECTION, SOLUTION EPIDURAL; INFILTRATION; INTRACAUDAL; PERINEURAL at 15:50

## 2020-06-22 RX ADMIN — PROPOFOL 200 MG: 10 INJECTION, EMULSION INTRAVENOUS at 15:51

## 2020-06-22 RX ADMIN — KETOROLAC TROMETHAMINE 30 MG: 30 INJECTION, SOLUTION INTRAMUSCULAR; INTRAVENOUS at 16:29

## 2020-06-22 RX ADMIN — DEXAMETHASONE SODIUM PHOSPHATE 8 MG: 4 INJECTION, SOLUTION INTRAMUSCULAR; INTRAVENOUS at 15:58

## 2020-06-22 RX ADMIN — FENTANYL CITRATE 50 MCG: 50 INJECTION, SOLUTION INTRAMUSCULAR; INTRAVENOUS at 14:57

## 2020-06-22 RX ADMIN — FENTANYL CITRATE 25 MCG: 50 INJECTION, SOLUTION INTRAMUSCULAR; INTRAVENOUS at 16:30

## 2020-06-22 RX ADMIN — FENTANYL CITRATE 25 MCG: 50 INJECTION, SOLUTION INTRAMUSCULAR; INTRAVENOUS at 16:25

## 2020-06-22 NOTE — PROGRESS NOTES
received telephone call from Jerzy with ambulatory surgery inquiring if CM could assist with setting up [facility] services for patient obtaining an outpatient procedure today. [facility] referrals sent to [facility], [address] [facility] declined patient due to short staff and inability to see patient on 6/23/20. [facility] can not service patient due to not accepting patient's insurance. [facility] also unable to service patient on 6/23/2020. CM contacted [facility] to see if they would be able to provide the service of going to patient's home to do wound care and dressing change tomorrow. Intake states that they can do this service, however, patient would need to call them to schedule appointment tomorrow morning. PACU nurse contacted to inform patient that she will need to call tomorrow morning to set up a home visit by [address] providers to complete a dressing change and teach patient to do additional changes. Pt can contact [facility] at anytime for additional visits. [facility] information provided inpatient's Tha Postal CHRIS Sarah, [address] - JUSTIN [facility]  [facility] ACP Facilitator  Zone Phone: [phone]      Mobile: [phone]

## 2020-06-22 NOTE — BRIEF OP NOTE
Brief Postoperative Note    Patient: Michelle Alcantar  YOB: 1980  MRN: [id number]    Date of Procedure: 6/22/2020     Pre-Op Diagnosis: Right Breast Abscess    Post-Op Diagnosis: Same as preoperative diagnosis.       Procedure(s):  RIGHT BREAST DRAINAGE OF ABSCESS (URGENT)    Surgeon(s):  Zoila Toscano MD    Surgical Assistant: None    Anesthesia: General     Estimated Blood Loss (mL): Minimal    Complications: None    Specimens:   ID Type Source Tests Collected by Time Destination   1 : Right Retroareolar Mass Preservative Breast  Zoila Toscano MD 6/22/2020 1601 Pathology        Implants: * No implants in log *    Drains: * No LDAs found *    Findings: induration and inflamed tissue    Electronically Signed by Aurora Solorio MD on 6/22/2020 at 4:15 PM

## 2020-06-22 NOTE — DISCHARGE INSTRUCTIONS
Discharge Instructions:  Right breast biopsy and drainage of abscess  Dr. Ofelia Kerr    Call tomorrow for appointment for follow up in 10 days [phone]    Activity:    Walk regularly. You may resume driving in 24 hours unless still requiring narcotics for pain. Work:    You may return to work in 7 days    Diet:    You may resume normal diet after 24 hours. Anesthesia and narcotics may cause nausea and vomiting. If persistent please call the office. Wound Care:    Damp to dry saline dressing changes twice daily. Dip corner of sterile 4X4 in Sterile Saline, pack and cover with other 4X4 and use sports bra to support. No need for tape. Wear bra to support breast even at night to support the breast during the first week after surgery. Medications:    Resume home medications as indicated on the Medical Reconciliation form. Aspirin, Coumadin, and Plavix can be restarted on post operative day 2 if you were taking them preoperatively. Pain medications:  Non steroidal antiinflammatories seem to work best for post surgical pain. Try these first as prescribed. A narcotic prescription will also be given for breakthrough pain. Over the counter stool softeners and laxatives may be used if needed. Do not hesitate to call with questions or concerns.    >>>You received Toradol during your surgery. You may not take any form of NSAIDS (non steroidal anti inflammatory drugs) such as Advil, Ibuprofen, Aleve, Motrin until 10:30pm.    TO PREVENT AN INFECTION      1. [address] YOUR HANDS     To prevent infection, good handwashing is the most important thing you or your caregiver can do.  Wash your hands with soap and water or use the hand  we gave you before you touch any wounds. 2. SHOWER     Use the antibacterial soap we gave you when you take a shower.  Shower with this soap until your wounds are healed.  To reach all areas of your body, you may need someone to help you.  Dont forget to clean your belly button with every shower. 3.  USE CLEAN SHEETS     Use freshly cleaned sheets on your bed after surgery.  To keep the surgery site clean, do not allow pets to sleep with you while your wound is still healing. 4. STOP SMOKING     Stop smoking, or at least cut back on smoking     Smoking slows your healing. DO NOT TAKE TYLENOL/ACETAMINOPHEN WITH PERCOCET, LORTAB, [address] TAKE NARCOTIC PAIN MEDICATIONS WITH FOOD     Narcotics tend to be constipating, we suggest taking a stool softener such as Colace or Miralax (follow package instructions). DO NOT DRIVE WHILE TAKING NARCOTIC PAIN MEDICATIONS. DO NOT TAKE SLEEPING MEDICATIONS OR ANTIANXIETY MEDICATIONS WHILE TAKING NARCOTIC PAIN MEDICATIONS,  ESPECIALLY THE NIGHT OF ANESTHESIA! CPAP PATIENTS BE SURE TO WEAR MACHINE WHENEVER NAPPING OR SLEEPING! DISCHARGE SUMMARY from Nurse    The following personal items collected during your admission are returned to you:   Dental Appliance: Dental Appliances: None  Vision: Visual Aid: None  Hearing Aid:    Jewelry: Jewelry: Body Piercing, Earrings, Ring, With patient, Other (comment)(Bag of jewelry in pt belonging bag)  Clothing: Clothing: With patient  Other Valuables: Other Valuables: Other (comment)(Sunglasses in PACU)  Valuables sent to safe:        PATIENT INSTRUCTIONS:    After General Anesthesia or Intravenous Sedation, for 24 hours or while taking prescription Narcotics:        Someone should be with you for the next 24 hours. For your own safety, a responsible adult must drive you home. · Limit your activities  · Recommended activity: Rest today, up with assistance today. Do not climb stairs or shower unattended for the next 24 hours. · Please start with a soft bland diet and advance as tolerated (no nausea) to regular diet. · If you have a sore throat you should try the following: fluids, warm salt water gargles, or throat lozenges.  If it does not improve after several days please follow up with your primary physician. · Do not drive and operate hazardous machinery  · Do not make important personal or business decisions  · Do  not drink alcoholic beverages  · If you have not urinated within 8 hours after discharge, please contact your surgeon on call. Report the following to your surgeon:  · Excessive pain, swelling, redness or odor of or around the surgical area  · Temperature over 100.5  · Nausea and vomiting lasting longer than 4 hours or if unable to take medications  · Any signs of decreased circulation or nerve impairment to extremity: change in color, persistent  numbness, tingling, coldness or increase pain      · You will receive a Post Operative Call from one of the Recovery Room Nurses on the day after your surgery to check on you. It is very important for us to know how you are recovering after your surgery. If you have an issue or need to speak with someone, please call your surgeon, do not wait for the post operative call. · You may receive an e-mail or letter in the mail from [facility] regarding your experience with us in the Ambulatory Surgery Unit. Your feedback is valuable to us and we appreciate your participation in the survey. · If the above instructions are not adequate or you are having problems after your surgery, call the physician at their office number. · We wish you a speedy recovery ? What to do at Home:      *  Please give a list of your current medications to your Primary Care Provider. *  Please update this list whenever your medications are discontinued, doses are      changed, or new medications (including over-the-counter products) are added. *  Please carry medication information at all times in case of emergency situations. If you have not received your influenza and/or pneumococcal vaccine, please follow up with your primary care physician.     The discharge information has been reviewed with the patient and caregiver. The patient and caregiver verbalized understanding.

## 2020-06-22 NOTE — PERIOP NOTES
1453: Patient received 50mcg IV fentanyl in preop holding as ordered by Dr. Andi Laguerre for pain rated 9/10 to right breast.     1456: Patient states some relief of pain. Additional fentanyl IV 50mcg given. 1505: Patient rates pain as 6/10.

## 2020-06-22 NOTE — ANESTHESIA POSTPROCEDURE EVALUATION
Procedure(s):  RIGHT BREAST DRAINAGE OF ABSCESS (URGENT). general    Anesthesia Post Evaluation      Multimodal analgesia: multimodal analgesia used between 6 hours prior to anesthesia start to PACU discharge  Patient location during evaluation: PACU  Patient participation: complete - patient participated  Level of consciousness: awake and alert  Pain score: 5  Anesthetic complications: no  Cardiovascular status: acceptable  Respiratory status: acceptable  Hydration status: acceptable  Post anesthesia nausea and vomiting:  none  Final Post Anesthesia Temperature Assessment:  Normothermia (36.0-37.5 degrees C)      INITIAL Post-op Vital signs:   Vitals Value Taken Time   /72 6/22/2020  4:45 PM   Temp 36.4 °C (97.6 °F) 6/22/2020  4:40 PM   Pulse 62 6/22/2020  4:56 PM   Resp 14 6/22/2020  4:56 PM   SpO2 100 % 6/22/2020  4:56 PM   Vitals shown include unvalidated device data.

## 2020-06-22 NOTE — PROGRESS NOTES
HISTORY OF PRESENT ILLNESS  Elder Record is a 44 y.o. female who comes in for follow up by Henrietta Riddle MD for a right breast abscess  Mass   Pertinent negatives include no chest pain, no abdominal pain, no headaches and no shortness of breath. She developed right breast pain, redness, and swelling near the nipple a week ago. She has had a little drainage as well. She went to the ER and I saw her  and an US suggested a 2.0 x 1.8 x 1.3 cm complex mass near the nipple. I aspirated the area and it is growing beta strep. She now comes back in for evaluation. She states ongoing pain and some drainage. She had a similar episode of presumed mastitis a year ago which resolved with antibiotics. Mammogram and US last year were ok. She denies fever, chills, or sweats. She had menarche at 15, first of two pregnancies at 32 and had a LAVH/USO in 2019 and is taking estrogen. She has a strong fam hx breast cancer (MA  at 48, MGM diet at 54, MA alive dx 39), PU with pancreatic cancer.        Past Medical History:   Diagnosis Date    Other ill-defined conditions(778.28)     ovarian cyst     Psychiatric disorder     suicide attempt, depression      Past Surgical History:   Procedure Laterality Date    HX APPENDECTOMY          HX GYN      BTL    HX GYN      tubes tied 10/2008    HX HYSTERECTOMY       Family History   Problem Relation Age of Onset    Heart Attack Mother     Hypertension Father     No Known Problems Sister     Cancer Maternal Aunt     Breast Cancer Maternal Aunt     No Known Problems Maternal Uncle     No Known Problems Paternal Aunt     Cancer Paternal Uncle         testicular cancer    Cancer Maternal Grandmother         lung/leukemia    Breast Cancer Maternal Grandmother     Diabetes Maternal Grandfather     No Known Problems Paternal Grandmother     Stroke Paternal Grandfather     Asthma Son     Breast Cancer Maternal Aunt      Social History     Tobacco Use    Smoking status: Current Every Day Smoker     Packs/day: 1.00     Years: 15.00     Pack years: 15.00     Types: Cigarettes    Smokeless tobacco: Never Used   Substance Use Topics    Alcohol use: Yes     Alcohol/week: 1.0 standard drinks     Types: 1 Cans of beer per week     Comment: occasional-weekends    Drug use: Never     Current Outpatient Medications   Medication Sig    cephALEXin (Keflex) 500 mg capsule Take 1 Cap by mouth four (4) times daily for 7 days.  ibuprofen (MOTRIN) 800 mg tablet Take 1 Tab by mouth every eight (8) hours as needed for Pain.  trimethoprim-sulfamethoxazole (BACTRIM DS, SEPTRA DS) 160-800 mg per tablet Take 1 Tab by mouth two (2) times a day for 10 days.  HYDROcodone-acetaminophen (NORCO) 5-325 mg per tablet Take 1 Tab by mouth every six (6) hours as needed for Pain for up to 5 days. Max Daily Amount: 4 Tabs.  estrogens, conjugated (CONJUGATED ESTROGENS PO) Take 1 mg by mouth daily.  benzonatate (TESSALON) 200 mg capsule Take 1 Cap by mouth three (3) times daily as needed for Cough. No current facility-administered medications for this visit. Allergies   Allergen Reactions    Beef Containing Products Other (comments)     Gi distress     Codeine Hives     Review of Systems   Constitutional: Negative for chills, diaphoresis, fever and weight loss. HENT: Negative for sore throat. Eyes: Negative for blurred vision and discharge. Respiratory: Negative for cough, shortness of breath and wheezing. Cardiovascular: Negative for chest pain, palpitations, orthopnea, claudication and leg swelling. Gastrointestinal: Negative for abdominal pain, constipation, diarrhea, heartburn, melena, nausea and vomiting. Genitourinary: Negative for dysuria, flank pain, frequency and hematuria. Musculoskeletal: Negative for back pain, joint pain, myalgias and neck pain. Skin: Negative for rash.    Neurological: Negative for dizziness, speech change, focal weakness, seizures, loss of consciousness, weakness and headaches. Endo/Heme/Allergies: Does not bruise/bleed easily. Psychiatric/Behavioral: Negative for depression and memory loss. Visit Vitals  /73 (BP Patient Position: Sitting)   Pulse 79   Temp 98 °F (36.7 °C) (Oral)   Resp 18   Ht 5' 7\" (1.702 m)   Wt 77.8 kg (171 lb 8 oz)   LMP 05/18/2019 (Approximate)   SpO2 95%   BMI 26.86 kg/m²       Physical Exam  Exam conducted with a chaperone present. Constitutional:       General: She is not in acute distress. Appearance: She is well-developed. She is not diaphoretic. HENT:      Head: Normocephalic and atraumatic. Mouth/Throat:      Pharynx: No oropharyngeal exudate. Eyes:      General: No scleral icterus. Conjunctiva/sclera: Conjunctivae normal.      Pupils: Pupils are equal, round, and reactive to light. Neck:      Musculoskeletal: Normal range of motion and neck supple. Thyroid: No thyromegaly. Vascular: No JVD. Trachea: No tracheal deviation. Cardiovascular:      Rate and Rhythm: Normal rate and regular rhythm. Heart sounds: No murmur. No friction rub. No gallop. Pulmonary:      Effort: Pulmonary effort is normal. No respiratory distress. Breath sounds: Normal breath sounds. No wheezing or rales. Chest:      Breasts:         Right: Mass, nipple discharge, skin change and tenderness present. No inverted nipple. Left: No inverted nipple, mass, nipple discharge, skin change or tenderness. Abdominal:      General: Bowel sounds are normal. There is no distension. Palpations: Abdomen is soft. There is no mass. Tenderness: There is no abdominal tenderness. There is no guarding or rebound. Musculoskeletal: Normal range of motion. Lymphadenopathy:      Cervical: No cervical adenopathy. Upper Body:      Right upper body: No supraclavicular, axillary or pectoral adenopathy.       Left upper body: No supraclavicular, axillary or pectoral adenopathy. Skin:     General: Skin is warm and dry. Coloration: Skin is not pale. Findings: No erythema or rash. Neurological:      Mental Status: She is alert and oriented to person, place, and time. Cranial Nerves: No cranial nerve deficit. Psychiatric:         Behavior: Behavior normal.         Thought Content: Thought content normal.         Judgment: Judgment normal.       I reviewed her US images from 6/19/2020    ASSESSMENT and PLAN  1. Right breast periareolar mastitis with possible mass. I explained to her about the anatomy and pathophysiology of the process and options for treatment with antibiotics/FNA and incision and drainage. Risks, benefits and limitations of each were discussed. Risks of surgical drainage include, but are not limited to, bleeding, infection, need for local wound care, poor healing/comsmesis and recurrence. Rx for bactrim as well as hydrocodone (#15)      She is not improving. Will perform mastotomy and drainage of deep abscess in OR later today under general anesthesia as an outpatient    The patient was counseled at length about the risks of barrington Covid-19 during their perioperative period and any recovery window from their procedure. The patient was made aware that barrington Covid-19  may worsen their prognosis for recovering from their procedure and lend to a higher morbidity and/or mortality risk. All material risks, benefits, and reasonable alternatives including postponing the procedure were discussed. The patient does  wish to proceed with the procedure at this time.           Jaimie Brennan MD FACS

## 2020-06-22 NOTE — PROGRESS NOTES
1. Have you been to the ER, urgent care clinic since your last visit? Hospitalized since your last visit? No    2. Have you seen or consulted any other health care providers outside of the [address] [place] [address] since your last visit? Include any pap smears or colon screening.   No

## 2020-06-22 NOTE — LETTER
6/23/20 Patient: Faisal Abdullahi YOB: 1980 Date of Visit: 6/22/2020 Kashmir Sky, [address] 
[address] [address] VIA In Basket Dear Kashmir Sky MD, Thank you for referring Ms. Liz Moreno to  [facility] for evaluation. My notes for this consultation are attached. If you have questions, please do not hesitate to call me. I look forward to following your patient along with you.  
 
 
Sincerely, 
 
Apolinar Gill MD

## 2020-06-22 NOTE — PERIOP NOTES
Spoke with Sanchez Beltran, care management ([phone]). Per Dr. Gerri Velez pt will need home health set up for BID wound care/teaching NS- damp to dry BID, start 6/23/20.

## 2020-06-22 NOTE — INTERVAL H&P NOTE
Update History & Physical 
 
The Patient's History and Physical of June 19, 2020 was reviewed with the patient and I examined the patient. There was she continues to have pain and swelling. She now desires drainage of breast abscess. The surgical site was confirmed by the patient and me. Plan:  The risk, benefits, expected outcome, and alternative to the recommended procedure have been discussed with the patient. Patient understands and wants to proceed with the procedure. The patient was counseled at length about the risks of barrington Covid-19 during their perioperative period and any recovery window from their procedure. The patient was made aware that barrington Covid-19  may worsen their prognosis for recovering from their procedure and lend to a higher morbidity and/or mortality risk. All material risks, benefits, and reasonable alternatives including postponing the procedure were discussed. The patient does  wish to proceed with the procedure at this time.  
 
 
 
Electronically signed by Natacha Harris MD on 6/22/2020 at 2:25 PM

## 2020-06-22 NOTE — PERIOP NOTES
Still painfull. Given IV Fentanyl and Toradol. Taking ice chips.  arranged [facility] to come in am to show wound care. 1730 Pt. Alert. Denies chill\ pain tolerable. Helps with bra and ice pack for support Discharge instructions reviewed with caregiver and patient. Allowed and answered questions. Tolerating PO fluids. Both state ready for discharge. 1740 Discharged to car without incident.  Has supplies for wound change and number to call for [facility] for tomorrow

## 2020-06-22 NOTE — ANESTHESIA PREPROCEDURE EVALUATION
Relevant Problems   No relevant active problems       Anesthetic History   No history of anesthetic complications            Review of Systems / Medical History  Patient summary reviewed, nursing notes reviewed and pertinent labs reviewed    Pulmonary          Smoker (1/2 ppd)         Neuro/Psych         Psychiatric history    Comments: Depression  H/o suicidal ideation Cardiovascular  Within defined limits                Exercise tolerance: >4 METS     GI/Hepatic/Renal  Within defined limits              Endo/Other  Within defined limits           Other Findings   Comments: Right breast abcess         Physical Exam    Airway  Mallampati: II  TM Distance: > 6 cm  Neck ROM: normal range of motion   Mouth opening: Normal     Cardiovascular    Rhythm: regular  Rate: normal         Dental  No notable dental hx       Pulmonary  Breath sounds clear to auscultation               Abdominal  GI exam deferred       Other Findings   Comments: Nose piercing         Anesthetic Plan    ASA: 2  Anesthesia type: general          Induction: Intravenous  Anesthetic plan and risks discussed with: Patient

## 2020-06-22 NOTE — PERIOP NOTES
Wilfredo Carmona  1980  [id number]    Situation:  Verbal report given from: RN and CRNA  Procedure: Procedure(s):  RIGHT BREAST DRAINAGE OF ABSCESS (URGENT)    Background:    Preoperative diagnosis: Right Breast Abscess    Postoperative diagnosis: Right Breast Abscess    :  Dr. Tato Pereira    Assistant(s): Circ-1: Angel Jones RN  Scrub RN-1: Patrick Santos RN  Surg Asst-1: Fabiola Pepper    Specimens:   ID Type Source Tests Collected by Time Destination   1 : Right Retroareolar Mass Preservative Breast  Sy Hernandes MD 6/22/2020 1601 Pathology   1 : Right Breast Abscess Wound Breast Mass,Right ANAEROBIC/AEROBIC/GRAM STAIN Sy Hernandes MD 6/22/2020 1600 Microbiology   2 : Right Retroareolar Mass Tissue Breast Mass,Right MYCOPLASMA AB, IGG/IGM Sy Hernandes MD 6/22/2020 1628 Microbiology       Assessment:  Intra-procedure medications         Anesthesia gave intra-procedure sedation and medications, see anesthesia flow sheet     Intravenous fluids: LR@ KVO     Vital signs stable. Pt denies chill. Complaining of pain.        Recommendation:    Permission to share finding with boyfriend : yes

## 2020-06-23 ENCOUNTER — TELEPHONE (OUTPATIENT)
Dept: SURGERY | Age: 40
End: 2020-06-23

## 2020-06-23 NOTE — OP NOTES
[address]  OPERATIVE REPORT    Name:  Afshan Jovel  MR#:  [id number]  :  1980  ACCOUNT #:  [de-identified]  DATE OF SERVICE:  2020    PREOPERATIVE DIAGNOSES:  Recurrent right breast mastitis and mass. POSTOPERATIVE DIAGNOSES:  Recurrent right breast mastitis and mass with abscess. PROCEDURE PERFORMED:  Incision and drainage of the right breast retroareolar abscess and biopsy of the retroareolar tissue. SURGEON:  Chadwick Puga MD    ASSISTANT:  Jose Fiore. ANESTHESIA:  General.    COMPLICATIONS:  None. SPECIMENS REMOVED:  Right breast tissue and also for microbiology aerobic, anaerobic and Mycoplasma. IMPLANTS:  None. ESTIMATED BLOOD LOSS:  5 mL. BRIEF HISTORY:  The patient is a pleasant 51-year-old female who has had prior mastitis of the right breast that has recurrence and not resolving with antibiotics. She already has an ulcerated area. She comes in for incision of the area and drainage of the deep tissue. She understood the risks and benefits including bleeding, infection, need for ongoing infection and ongoing care, need for local wound care ultimately for cosmesis at the end and as well as nerve damage in the end. PROCEDURE:  The patient was taken to the operating room, placed on the operating table in supine position, underwent general anesthesia with laryngeal mask and the right breast was prepped and draped in the usual sterile fashion. After appropriate time-out and antibiotics were given, 0.5% Marcaine with epinephrine was infiltrated into the skin and subcutaneous tissues surrounding the area. We ellipsed out the prior ulcerated area and some surrounding tissue and then went down into the deeper tissue and excised that as well because there was a questionable mass on ultrasound. After that was completed, we scraped out the nipple areola and obtained cultures as well as cultures of the tissue.   I irrigated it out with saline irrigation and then packed it with saline soaked moistened gauze. Sterile dressing was applied over the top. At the end of the procedure, the needle, sponge and instrument counts were correct x2. The patient had tolerated the procedure well and was brought to the recovery room.       Katlin Gutierrez MD      [id number]  D:  06/22/2020 16:21  T:  06/22/2020 23:56  JOB #:  [id number]  CC:  MD Kacie Davenport MD

## 2020-06-24 LAB
BACTERIA SPEC CULT: ABNORMAL
GRAM STN SPEC: ABNORMAL
GRAM STN SPEC: ABNORMAL
SERVICE CMNT-IMP: ABNORMAL

## 2020-06-25 LAB
BACTERIA SPEC CULT: ABNORMAL
SERVICE CMNT-IMP: ABNORMAL

## 2020-06-26 ENCOUNTER — TELEPHONE (OUTPATIENT)
Dept: SURGERY | Age: 40
End: 2020-06-26

## 2020-06-26 NOTE — TELEPHONE ENCOUNTER
I left message on answering machine returning patients call. Attempted to call twice. Patient called back and said Dl Reeves [phone] who has been providing wound care told them today they can not do daily wound care as they are an urgent care. She states she  they told her she needs home health and the PCP or Dr. Courtney Coffey office should set that up. Patient states her step mother is doing the wound care at night, stating she is a nurse. I called to dispatch Health and they did indeed say they do not do daily wound care  And the patient could call them if needed but patient would need home health for daily wound care. I spoke with Dr. Courtney Coffey and he said once a day is fine. I told patient if step mother is comfortable doing it then that is ok. I told her since its Friday afternoon we are notr able to set up home health but to call me on Monday if she want home health instead of step mother. I told her what signs to look for as far as infection and to call me back if any concerns.

## 2020-06-26 NOTE — TELEPHONE ENCOUNTER
Patient states the nurse that has been coming to assist with her wound told her she was not supposed to come every day, just the first day after surgery. She was told that our office should set up wound care for her. Please advise.

## 2020-06-29 LAB
Lab: NORMAL
REFERENCE LAB,REFLB: NORMAL
TEST DESCRIPTION:,ATST: NORMAL

## 2020-07-01 ENCOUNTER — OFFICE VISIT (OUTPATIENT)
Dept: SURGERY | Age: 40
End: 2020-07-01

## 2020-07-01 VITALS
HEART RATE: 85 BPM | DIASTOLIC BLOOD PRESSURE: 81 MMHG | HEIGHT: 67 IN | WEIGHT: 172 LBS | RESPIRATION RATE: 18 BRPM | OXYGEN SATURATION: 96 % | BODY MASS INDEX: 27 KG/M2 | SYSTOLIC BLOOD PRESSURE: 125 MMHG | TEMPERATURE: 97.3 F

## 2020-07-01 DIAGNOSIS — Z09 POSTOPERATIVE EXAMINATION: Primary | ICD-10-CM

## 2020-07-01 RX ORDER — ESTRADIOL 1 MG/1
TABLET ORAL
COMMUNITY
Start: 2020-06-10

## 2020-07-01 NOTE — LETTER
NOTIFICATION OF RETURN TO WORK  
 
7/1/2020 2:29 PM 
 
Ms. Alexandra Haney [address]  [address] [place] [phone] Rosy Mayer To Whom It May Concern: Alexandra Haney was under the care of [address] from 6/19/2020 to present. She will be able to return to work on 7/6/2020 with no restrictions. If there are questions or concerns please have the patient contact our office. Sincerely, 
 
 
John Mendoza MD/satya

## 2020-07-01 NOTE — LETTER
NOTIFICATION OF RETURN TO WORK  
 
7/1/2020 2:28 PM 
 
Ms. Waters Slight [address]  [address] [place] [phone] Bozena Waterman

## 2020-07-01 NOTE — PROGRESS NOTES
1. Have you been to the ER, urgent care clinic since your last visit? Hospitalized since your last visit? no    2. Have you seen or consulted any other health care providers outside of the [address] [place] [address] since your last visit? Include any pap smears or colon screening.   No    Step mother is doing daily wound care

## 2020-07-01 NOTE — PROGRESS NOTES
Surgery  Follow up  Procedure: I and D and bx right breast abscess  OR date:  6/20/2020  Path:       Right retroareolar mass, excisional biopsy:   Abscess and chronic mastitis with occasional venous thrombus   Negative for malignancy     MICROBIOLOGY:   Strep B and diptheroids    S I feel fine, no diarrhea    Visit Vitals  /81 (BP 1 Location: Left arm, BP Patient Position: Sitting)   Pulse 85   Temp 97.3 °F (36.3 °C) (Oral)   Resp 18   Ht 5' 7\" (1.702 m)   Wt 78 kg (172 lb)   LMP 05/18/2019 (Approximate)   SpO2 96%   BMI 26.94 kg/m²       O Wound with moderate amount of granulation tissue    1 x 1 x 1 cm    A/P Doing fair.   Still with pain   Change dressings to calcium alginate Q2 days    RTW  7/6   NSAIDs for pain    RTC 3 weeks    John Mendoza MD FACS

## 2020-07-06 ENCOUNTER — TELEPHONE (OUTPATIENT)
Dept: SURGERY | Age: 40
End: 2020-07-06

## 2020-07-06 NOTE — TELEPHONE ENCOUNTER
Patient needs a letter stating she can return back to work no restrictions, full time. Please call patient with any questions. Fax#: [phone]    Patient cannot return to work until letter is faxed to .

## 2020-07-06 NOTE — TELEPHONE ENCOUNTER
I returned patients call and verified patient with 2 patient identifiers. I let her know fax was not going thru. She said she will  at office. Letter put at .

## 2020-07-22 ENCOUNTER — OFFICE VISIT (OUTPATIENT)
Dept: SURGERY | Age: 40
End: 2020-07-22

## 2020-07-22 VITALS
RESPIRATION RATE: 18 BRPM | HEART RATE: 83 BPM | SYSTOLIC BLOOD PRESSURE: 126 MMHG | BODY MASS INDEX: 27.1 KG/M2 | TEMPERATURE: 98.7 F | OXYGEN SATURATION: 95 % | DIASTOLIC BLOOD PRESSURE: 72 MMHG | WEIGHT: 173 LBS

## 2020-07-22 DIAGNOSIS — Z09 POSTOPERATIVE EXAMINATION: Primary | ICD-10-CM

## 2020-07-22 NOTE — LETTER
7/22/20 Patient: Ludwig Reeves YOB: 1980 Date of Visit: 7/22/2020 Vimal Argueta, [address] 
[address] [address] VIA In Basket Dear Vimal Argueta MD, Thank you for referring Ms. Hu Cunningham to [address] for evaluation. My notes for this consultation are attached. If you have questions, please do not hesitate to call me. I look forward to following your patient along with you.  
 
 
Sincerely, 
 
Jennifer Franks MD

## 2020-07-22 NOTE — PROGRESS NOTES
Surgery  Follow up  Procedure: I and D and bx right breast abscess  OR date:  6/20/2020  Path:       Right retroareolar mass, excisional biopsy:   Abscess and chronic mastitis with occasional venous thrombus   Negative for malignancy     MICROBIOLOGY:   Strep B and diptheroids    S I feel fine, no diarrhea    Visit Vitals  /72   Pulse 83   Temp 98.7 °F (37.1 °C) (Oral)   Resp 18   Wt 78.5 kg (173 lb)   LMP 05/18/2019 (Approximate)   SpO2 95%   BMI 27.10 kg/m²       O Wound healed with thin eschar superficiall    A/P Doing fair.   Still with pain      Discussed genetic testing and she is interested   She will come back for genetic testing   RTC for testing    Arya Hickman MD FACS

## 2020-07-22 NOTE — PROGRESS NOTES
1. Have you been to the ER, urgent care clinic since your last visit? Hospitalized since your last visit? No    2. Have you seen or consulted any other health care providers outside of the [address] [place] [address] since your last visit? Include any pap smears or colon screening.  No [facility] ENCOUNTER    Patient : Jaya Pineda Age: 19 year old Sex: female   MRN: [id number] Encounter Date: 2/24/2020  PCP: No Pcp    On 2/24/2020, Karen TERRY scribed the services personally performed by XENIA Carbajal    CHIEF COMPLAINT    Chief Complaint   Patient presents with   • Leg Swelling       History of Present Illness     Jaya Pineda is a 19 year old female who presents to the ED via EMS with chief complaint of bilateral leg swelling with an onset of four days ago.  Pt reports redness, swelling and pain to her bilateral calves.  Pt states that she was on a plane February 4th for two hours.  Pt is on birth control.  Pt declines pain medication at this time.  Pt denies chest pain, shortness of breath, cough, fever, nausea, vomiting, diarrhea, chills, neck pain, headache, weakness, abdominal pain, back pain, dizziness/vertigo, blurry/double vision, numbness/tingling, or urinary symptoms.          Health Status     ALLERGIES:  No Known Allergies    Prior to Admission Medications    No medications on file       New Prescriptions    CEPHALEXIN (KEFLEX) 500 MG CAPSULE    Take 1 capsule by mouth 4 times daily for 10 days.       Past Medical History     History reviewed. No pertinent past medical history.    History reviewed. No pertinent surgical history.    History reviewed. No pertinent family history.    Social History     Tobacco Use   • Smoking status: Never Smoker   • Smokeless tobacco: Never Used   Substance Use Topics   • Alcohol use: Never     Frequency: Never   • Drug use: Never         Review of Systems      All systems otherwise negative, ROS reviewed as documented in chart.      Physical Exam     ED Triage Vitals [02/24/20 1521]   /70   Heart Rate 82   Resp 16   Temp 97.9 °F (36.6 °C)   SpO2 100 %     General:  Alert. No acute distress.    Skin:  Warm. Intact.    Head:  Normocephalic. Atraumatic.    Neck:  Supple.   Eye:  EOMI. Normal  conjunctiva.  ENMT: Oral mucosa moist.  Cardiovascular:  Normal peripheral perfusion. Tachycardic  Respiratory:  Respirations are non-labored. Lungs CTA.  No respiratory distress.  Abdominal:  Nontender.  Nondistended.  No guarding or rebound tenderness.  Musculoskeletal:  Normal ROM.  Bilateral lower leg swelling and erythema extending from the knee to feet, left calf tenderness, no tenderness to right calf, no bony tenderness throughout BLE, +2 DP pulses, normal strength, normal sensation and capillary refill, neurovascularly intact  Neurological:  A/O x 4.  No cranial nerve deficits.  Psychiatric:  Cooperative. Appropriate mood & affect.        Physical Exam      Medical Decision Making     Rationale:  Multiple differential diagnoses were considered. The patient / caregivers were apprised of diagnostic / treatment options including alternate modes of care, in addition to the risks and benefits, for this medical condition. Based on this discussion the patient / caregiver agrees with this chosen diagnostic and treatment plan.    Orders   Medications - No data to display    LABORATORY RESULTS    Results for orders placed or performed during the hospital encounter of 02/24/20   CBC with Automated Differential   Result Value    WBC 7.6    RBC 4.04     Comment: QA FLAGS AND/OR RANGES MODIFIED BY DEMOGRAPHIC UPDATE ON 02/24 AT 1638    HGB 11.4 (L)     Comment: QA FLAGS AND/OR RANGES MODIFIED BY DEMOGRAPHIC UPDATE ON 02/24 AT 1638    HCT 35.3 (L)     Comment: QA FLAGS AND/OR RANGES MODIFIED BY DEMOGRAPHIC UPDATE ON 02/24 AT 1638    MCV 87.4     Comment: QA FLAGS AND/OR RANGES MODIFIED BY DEMOGRAPHIC UPDATE ON 02/24 AT 1638    MCH 28.2     Comment: QA FLAGS AND/OR RANGES MODIFIED BY DEMOGRAPHIC UPDATE ON 02/24 AT 1638    MCHC 32.3     Comment: QA FLAGS AND/OR RANGES MODIFIED BY DEMOGRAPHIC UPDATE ON 02/24 AT 1638    RDW-CV 12.5     Comment: QA FLAGS AND/OR RANGES MODIFIED BY DEMOGRAPHIC UPDATE ON 02/24 AT 1638          Comment: QA FLAGS AND/OR RANGES MODIFIED BY DEMOGRAPHIC UPDATE ON 02/24 AT 1638    NRBC 0     Comment: QA FLAGS AND/OR RANGES MODIFIED BY DEMOGRAPHIC UPDATE ON 02/24 AT 1638    DIFF TYPE AUTOMATED DIFFERENTIAL    Neutrophil 69    LYMPH 22    MONO 8    EOSIN 1    BASO 0    Percent Immature Granuloctyes 0    Absolute Neutrophil 5.3     Comment: QA FLAGS AND/OR RANGES MODIFIED BY DEMOGRAPHIC UPDATE ON 02/24 AT 1638    Absolute Lymph 1.7     Comment: QA FLAGS AND/OR RANGES MODIFIED BY DEMOGRAPHIC UPDATE ON 02/24 AT 1638    Absolute Mono 0.6     Comment: QA FLAGS AND/OR RANGES MODIFIED BY DEMOGRAPHIC UPDATE ON 02/24 AT 1638    Absolute Eos 0.1     Comment: QA FLAGS AND/OR RANGES MODIFIED BY DEMOGRAPHIC UPDATE ON 02/24 AT 1638    Absolute Baso 0.0     Comment: QA FLAGS AND/OR RANGES MODIFIED BY DEMOGRAPHIC UPDATE ON 02/24 AT 1638    Absolute Immature Granulocytes 0.0     Comment: QA FLAGS AND/OR RANGES MODIFIED BY DEMOGRAPHIC UPDATE ON 02/24 AT 1638   Comprehensive Metabolic Panel   Result Value    Sodium 139    Potassium 4.0    Chloride 110 (H)    Carbon Dioxide 27     Comment: QA FLAGS AND/OR RANGES MODIFIED BY DEMOGRAPHIC UPDATE ON 02/24 AT 1638    Anion Gap 6 (L)     Comment: QA FLAGS AND/OR RANGES MODIFIED BY DEMOGRAPHIC UPDATE ON 02/24 AT 1638    Glucose 99    BUN 14    Creatinine 0.81    GFR Estimate,  NOT CALCULATED FOR UNKNOWN SEX OR AGE    GFR Estimate, Non  NOT CALCULATED FOR UNKNOWN SEX OR AGE    BUN/Creatinine Ratio 17     Comment: QA FLAGS AND/OR RANGES MODIFIED BY DEMOGRAPHIC UPDATE ON 02/24 AT 1638    CALCIUM 8.9    TOTAL BILIRUBIN 0.3    AST/SGOT 24    ALT/SGPT 24     Comment: QA FLAGS AND/OR RANGES MODIFIED BY DEMOGRAPHIC UPDATE ON 02/24 AT 1638    ALK PHOSPHATASE 66    TOTAL PROTEIN 7.1    Albumin 3.5 (L)    GLOBULIN 3.6     Comment: QA FLAGS AND/OR RANGES MODIFIED BY DEMOGRAPHIC UPDATE ON 02/24 AT 1638    A/G Ratio, Serum 1.0     Comment: QA FLAGS AND/OR RANGES  MODIFIED BY DEMOGRAPHIC UPDATE ON 02/24 AT 1638   Prothrombin Time   Result Value    PROTIME 11.1     Comment: QA FLAGS AND/OR RANGES MODIFIED BY DEMOGRAPHIC UPDATE ON 02/24 AT 1638    INR 1.1     Comment: INR Therapeutic Range: 2.0 to 3.0 (2.5 to 3.5 recommended for recurrent thrombotic episodes and mechanical prosthetic heart valves.)    Partial Thromboplastin Time   Result Value    PTT 28     Comment: PTT  Therapeutic Range:  45-70 seconds.   Creatine Kinase   Result Value             RADIOLOGY RESULTS  US VASC EXTREMITY LOWER VENOUS DUPLEX   Final Result   1. No evidence of deep venous thrombosis in the visualized veins of the lower extremities bilaterally.      Electronically Signed by: DANIELE LAU M.D.    Signed on: 2/24/2020 3:56 PM                MDM:  Pt is a healthy 20 yo F sent from [facility] for bilateral lower leg swelling and pain x 3-4 days.  Pt also reports redness and warmth to BLE.  Flew here from [place] 3 weeks ago.  Pt takes OCPs.  Denies fevers, sob, cp, or acute injury/trauma.  Physical exam as noted above.  Significant for BLE swelling symmetrical from the knees distally with erythema and warmth.  NVI.  No bony ttp.  Labs unremarkable including WBC and CPK.  US BLE show no evidence of acute DVT.  Will cover pt with keflex for possible cellulitis/soft tissue infection.  Sx more likely 2/2 overuse from extensive physical activity in boot [employer].  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  Discussed return precautions.  Pt understands and agrees with plan for dc back to [facility].    ED Course     Vitals:    02/24/20 1521   BP: 125/70   Pulse: 82   Resp: 16   Temp: 97.9 °F (36.6 °C)   TempSrc: Oral   SpO2: 100%   Weight: 69.9 kg (154 lb)   Height: 5' 8\" (1.727 m)       ED Course as of Feb 24 1729   Mon Feb 24, 2020   1702 Reviewed diagnostic results with pt, as well as plans for disposition. Pt agrees to d/c home and understands need for f/u on an  outpatient basis.              Procedures       Impression and Plan     The primary encounter diagnosis was Swelling of lower leg. A diagnosis of Cellulitis of lower extremity, unspecified laterality was also pertinent to this visit.      Disposition:  Time: 1703      Condition:  IMPROVED and STABLE    Discharged to Home .    Discharge Instructions were provided.    Follow Up:  No follow-up provider specified.     New Prescriptions    CEPHALEXIN (KEFLEX) 500 MG CAPSULE    Take 1 capsule by mouth 4 times daily for 10 days.         Counseled:  Patient, Regarding diagnosis, Regarding diagnostic results, Regarding prescriptions,  Regarding treatment and Patient understood      DISCUSSION OF PLAN AND IMPORTANCE OF FOLLOW-UP CARE:  The plan was discussed verbally and key components were summarized in the discharge instructions. All questions were answered. In discussing management and follow-up, they are advised that the plan is based only on information that was available at the time of emergency department evaluation. Additional testing and treatment may be necessary, and outpatient evaluation is frequently required to ensure complete care. At the same time, there is always potential for symptoms to recur or worsen, or for additional signs or symptoms to develop that could substantially affect the treatment plan. If any new or uncontrolled symptoms occur, or if there are any other concerns, they are advised to seek medical evaluation immediately.    This document serves as a record of the services and decisions personally performed and made by Mey Rocha PA-C. It was created on the provider's behalf by Karen Farmer, a trained medical scribe. The creation of this document is based on the provider's statements to the medical scribe.     Karen Farmer, 2/24/2020, 1703    The information in this document, crested by medical scribe for me, accurately reflects the services I personally performed and the decisions made  by me, Mey Rocha PA-C. I have reviewed and approved this document for accuracy prior to leaving the patient care area.      , .2/24/2020  .5:29 PM              Signed:  Mey Rocha PA-C  2/24/2020    Patient seen in conjunction with Dr. Shadia Meyers MD.         Mey Rocha PA-C  02/24/20 1739

## 2020-09-22 ENCOUNTER — VIRTUAL VISIT (OUTPATIENT)
Dept: FAMILY MEDICINE CLINIC | Age: 40
End: 2020-09-22
Payer: COMMERCIAL

## 2020-09-22 DIAGNOSIS — B85.2 LICE INFESTATION: Primary | ICD-10-CM

## 2020-09-22 DIAGNOSIS — L29.9 ITCHING: ICD-10-CM

## 2020-09-22 PROCEDURE — 99213 OFFICE O/P EST LOW 20 MIN: CPT | Performed by: FAMILY MEDICINE

## 2020-09-22 RX ORDER — MALATHION 0 G/ML
LOTION TOPICAL ONCE
Qty: 59 ML | Refills: 1 | Status: SHIPPED | OUTPATIENT
Start: 2020-09-22 | End: 2021-01-05

## 2020-09-22 RX ORDER — HYDROXYZINE 50 MG/1
50 TABLET, FILM COATED ORAL
Qty: 30 TAB | Refills: 1 | Status: SHIPPED | OUTPATIENT
Start: 2020-09-22 | End: 2020-10-02

## 2020-09-22 NOTE — PROGRESS NOTES
VIRTUAL VISIT      Pursuant to the emergency declaration under the [address] and [facility], [facility] waiver authority and the [employer] Act, this Virtual Visit was conducted, with patient's consent, to reduce the patient's risk of exposure to COVID-19 and provide continuity of care for an established patient. Services were provided through a video synchronous discussion virtually to substitute for in-person appointment. Consent:  She and/or her healthcare decision maker is aware that this patient-initiated Telehealth encounter is a billable service, with coverage as determined by her insurance carrier. She is aware that she may receive a bill and has provided verbal consent to proceed: Yes    HISTORY OF PRESENT ILLNESS  Cordelia Howard is a 44 y.o. female presents with Other (Elie Sims ,son,and daughter has them, otc medication is not getting rid of the lice)      Agree with nurse note. Pt's PCP is Dr. Elise Cesar who is out of the office. She is seeing me today as a courtesy for acute care. Pt complains of lice infestation in her household and itching of her hairline, scalp, BL eyebrows, BL ears and sparingly where her long thick hair touches like on her neck, BL shoulders, chest, upper back. Lice was first noticed on her daughter when she came out of the ocean while on vacation in [place] the week of 8/28/2020. The pt and her son then contracted the lice from her daughter. She later learned her sister had lice while babysitting her daughter prior to their beach vacation but did not tell the family until now. This has caused pt to miss work. Her last work day was 9/15/2020. Last week, pt noticed bite marks on her BL hands which she believes is a result of her sleep position. She sleeps with her hair down and her hands under her face.  Pt has used OTC Nix 3 times, [employer] brand lice shampoo, mousse, spray, detangler, and herbal remedies such as tea tree oil, Mayonnaise, coconut oil, and Cetaphil with relief for 2-3 days before recurrence. She washes their clothes and towels regularly in hot water. Pt denies bites between the webs of her fingers. Pt requests a work excuse letter for the dates of 9/15-30/2020 be written today. Her job does not want her back until the lice issue has totally cleared. She also requests Rx for a lice lotion like the Rx NP Chelsi West wrote her daughter that was not covered by [employer]. Written by florentino Lopes, as dictated by Dr. Kiley Chang DO.    ROS    Review of Systems negative except as noted above in HPI. ALLERGIES:    Allergies   Allergen Reactions    Beef Containing Products Other (comments)     Gi distress     Codeine Hives       CURRENT MEDICATIONS:    Outpatient Medications Marked as Taking for the 9/22/20 encounter (Virtual Visit) with Carmen De La Torre DO   Medication Sig Dispense Refill    malathion (OVIDE) 0.5 % lotion Apply  to affected area once for 1 dose. Apply to affected areas. Let stand x 8-12 hours. Rinse. 59 mL 1    hydrOXYzine HCL (ATARAX) 50 mg tablet Take 1 Tab by mouth three (3) times daily as needed for Itching for up to 10 days. Indications: itching 30 Tab 1    estradioL (ESTRACE) 1 mg tablet TAKE 1 TABLET BY MOUTH ONCE DAILY      ibuprofen (MOTRIN) 800 mg tablet Take 1 Tab by mouth every eight (8) hours as needed for Pain.  30 Tab 0       PAST MEDICAL HISTORY:    Past Medical History:   Diagnosis Date    Other ill-defined conditions(316.57)     ovarian cyst     Psychiatric disorder     suicide attempt, depression        PAST SURGICAL HISTORY:    Past Surgical History:   Procedure Laterality Date    HX APPENDECTOMY      1999    HX CYST INCISION AND DRAINAGE Right 6/22/2020    RIGHT BREAST DRAINAGE OF ABSCESS (URGENT) performed by Brittani Michelle MD at [facility] AMBULATORY OR    HX GYN      BTL    HX GYN      tubes tied 10/2008    HX HYSTERECTOMY         FAMILY HISTORY:    Family History   Problem Relation Age of Onset    Heart Attack Mother     Hypertension Father     No Known Problems Sister     Cancer Maternal Aunt     Breast Cancer Maternal Aunt     No Known Problems Maternal Uncle     No Known Problems Paternal Aunt     Cancer Paternal Uncle         testicular cancer    Cancer Maternal Grandmother         lung/leukemia    Breast Cancer Maternal Grandmother     Diabetes Maternal Grandfather     No Known Problems Paternal Grandmother     Stroke Paternal Grandfather     Asthma Son     Breast Cancer Maternal Aunt        SOCIAL HISTORY:    Social History     Socioeconomic History    Marital status: SINGLE     Spouse name: Not on file    Number of children: Not on file    Years of education: Not on file    Highest education level: Not on file   Tobacco Use    Smoking status: Current Every Day Smoker     Packs/day: 1.00     Years: 15.00     Pack years: 15.00     Types: Cigarettes    Smokeless tobacco: Never Used   Substance and Sexual Activity    Alcohol use:  Yes     Alcohol/week: 1.0 standard drinks     Types: 1 Cans of beer per week     Comment: occasional-weekends    Drug use: Never    Sexual activity: Yes     Birth control/protection: Surgical     Comment:         IMMUNIZATIONS:    Immunization History   Administered Date(s) Administered    TB Skin Test (PPD) Intradermal 08/11/2017         PHYSICAL EXAMINATION (PER VISUAL INSPECTION AND INSTRUCTIONS GIVEN TO PATIENT TO PERFORM)    Due to this being a TeleHealth encounter (During [place] [address] public [facility] emergency), evaluation of the following organ systems was limited to:     Vital Signs    Visit Vitals  LMP 05/18/2019 (Approximate)       Weight Metrics 7/22/2020 7/1/2020 6/22/2020 6/22/2020 6/19/2020 6/19/2020 2/11/2020   Weight 173 lb 172 lb 170 lb 171 lb 8 oz 171 lb 8 oz 169 lb 5 oz 169 lb   BMI 27.1 kg/m2 26.94 kg/m2 26.63 kg/m2 26.86 kg/m2 26.86 kg/m2 26.52 kg/m2 26.47 kg/m2       General appearance - Well nourished. Well appearing. Well developed. No acute distress. Obese. Pt was scratching her scalp intermittently throughout the exam.  Head - Normocephalic. Atraumatic. Eyes - Extraocular eye movements intact. Sclera anicteric. Mildly injected sclera. Ears - Hearing is grossly normal bilaterally. Nose - normal and patent. No discharge. Chest - No visualized signs of difficulty breathing or respiratory distress. Neurological - awake, alert and oriented to person, place, and time and event. Cranial nerves II through XII intact. Clear speech. Musculoskeletal - Intact x 4 extremities. No pain with movement. Psychological -   normal behavior, dress and thought processes. Good insight. Good eye contact. Normal affect. Appropriate mood. Normal speech. DATA REVIEWED    Lab Results   Component Value Date/Time    WBC 12.0 (H) 06/19/2020 10:58 AM    HGB 15.0 06/19/2020 10:58 AM    HCT 44.0 06/19/2020 10:58 AM    PLATELET 798 43/44/2836 10:58 AM    MCV 96.5 06/19/2020 10:58 AM     Lab Results   Component Value Date/Time    Sodium 138 06/19/2020 10:58 AM    Potassium 3.9 06/19/2020 10:58 AM    Chloride 108 06/19/2020 10:58 AM    CO2 27 06/19/2020 10:58 AM    Anion gap 3 (L) 06/19/2020 10:58 AM    Glucose 86 06/19/2020 10:58 AM    BUN 11 06/19/2020 10:58 AM    Creatinine 0.77 06/19/2020 10:58 AM    BUN/Creatinine ratio 14 06/19/2020 10:58 AM    GFR est AA >60 06/19/2020 10:58 AM    GFR est non-AA >60 06/19/2020 10:58 AM    Calcium 8.0 (L) 06/19/2020 10:58 AM    Bilirubin, total 0.5 06/25/2018 05:17 PM    Alk.  phosphatase 78 06/25/2018 05:17 PM    Protein, total 7.3 06/25/2018 05:17 PM    Albumin 3.8 06/25/2018 05:17 PM    Globulin 3.5 06/25/2018 05:17 PM    A-G Ratio 1.1 06/25/2018 05:17 PM    ALT (SGPT) 29 06/25/2018 05:17 PM     No results found for: CHOL, CHOLPOCT, CHOLX, CHLST, CHOLV, HDL, HDLPOC, HDLP, LDL, LDLCPOC, LDLC, DLDLP, VLDLC, VLDL, TGLX, TRIGL, TRIGP, TGLPOCT, CHHD, CHHDX  No results found for: Narciso Mantilla, VD3RIA    Lab Results   Component Value Date/Time    Hemoglobin A1c 5.2 08/02/2017 01:15 PM     Lab Results   Component Value Date/Time    TSH 0.805 08/02/2017 01:15 PM       ASSESSMENT and PLAN      IGM-13-SD SCB-2-YK    1. Lice infestation  V18.4 132.9 malathion (OVIDE) 0.5 % lotion      hydrOXYzine HCL (ATARAX) 50 mg tablet      REFERRAL TO DERMATOLOGY   2. Itching  L29.9 698.9        Discussed the patient's BMI with her. The BMI follow up plan is as follows: I have counseled this patient on diet and exercise regimens. Chart reviewed and updated. Work excuse letter completed today due to lice infestation and itching. Continue current medications and care. Apply Ovide 0.5% lotion to all infected areas. Let stay in for 8-12 hours before rinsing. Avoid heat while using treatment. Avoid getting lotion in eyes. Repeat after 1 week. Recommend pt wear hair up at night. Recommend pt wash clothes and towels immediately after use. Prescriptions written and sent to pharmacy; medication side effects discussed. Atarax 50 mg, Ovide 0.5% lotion  Referrals given; patient urged to keep appointments with specialists. Dermatology. Contact information for Dr. Raphael Padilla given to pt today. Contact information for MyChart help line given to pt today. Counseled patient on health concerns: lice infestation   Relevant handouts given and discussed with patient. Offered empathy, support, legitimation, prayers, partnership to patient. Praised patient for progress. Encouraged the pt to sign up for MyChart to be able to view results and send me any questions or concerns prior to the next visit where we will go over results in detail. Patient was offered a choice/choices in the treatment plan today. Patient expresses understanding of the plan and agrees with recommendations.     29 mins spent face to face with patient and more than 50% of this time spent in counseling and coordinating care. Written by florentino Mccullough, as dictated by Dr. Marco Stearns DO. Documentation True and Accepted by Nazanin Madden.  Thomas Son

## 2020-09-22 NOTE — PROGRESS NOTES
Rasheeda Alcantara is a 44 y.o. female  HIPAA verified by two patient identifiers. Health Maintenance Due   Topic    Pneumococcal 0-64 years (1 of 1 - PPSV23)    DTaP/Tdap/Td series (1 - Tdap)    PAP AKA CERVICAL CYTOLOGY     Flu Vaccine (1)     Chief Complaint   Patient presents with   Galo Carter ,son,and daughter has them, otc medication is not getting rid of the lice     No flowsheet data found. Pain Scale: 6/10  Pain Location:head             1. Have you been to the ER, urgent care clinic since your last visit? Hospitalized since your last visit? No    2. Have you seen or consulted any other health care providers outside of the [address] [place] [address] since your last visit? Include any pap smears or colon screening.  No     # [phone]

## 2020-09-22 NOTE — LETTER
NOTIFICATION RETURN TO WORK / SCHOOL 
 
9/22/2020 4:45 PM 
 
Ms. Alexandra Haney [address]  [address] [place] [id number] To Whom It May Concern: Alexandra Haney is currently under the care of Kevin Bloom. She will return to work/school on: 85/32/71 due to lice. Her symptoms began 09/15/20. Please excuse  
 
her from work during this time of recovery. If there are questions or concerns please have the patient contact our office.  
 
 
 
Sincerely, 
 
 
Jonny Lugo, DO

## 2021-01-05 DIAGNOSIS — B85.2 LICE INFESTATION: ICD-10-CM

## 2021-01-05 RX ORDER — MALATHION 0 G/ML
LOTION TOPICAL
Qty: 59 ML | Refills: 0 | Status: SHIPPED | OUTPATIENT
Start: 2021-01-05 | End: 2021-06-30

## 2021-01-05 NOTE — TELEPHONE ENCOUNTER
PCP: Robin Franklin MD    Last appt: 9/22/2020  No future appointments. Requested Prescriptions     Pending Prescriptions Disp Refills    malathion (OVIDE) 0.5 % lotion [Pharmacy Med Name: Malathion 0.5 % External Lotion] 59 mL 0     Sig: APPLY TO THE AFFECTED AREA ONCE FOR 1 DOSE. APPLY TO AFFECTED AREAS. LET STAND 8-12 HOURS. RINSE. No visits with results within 3 Month(s) from this visit. Latest known visit with results is:   Admission on 06/22/2020, Discharged on 06/22/2020   Component Date Value Ref Range Status    Special Requests: 06/22/2020 NO SPECIAL REQUESTS    Final    Culture result: 06/22/2020 LIGHT ANAEROBIC GRAM POSITIVE COCCI*   Final    Special Requests: 06/22/2020 NO SPECIAL REQUESTS    Final    GRAM STAIN 06/22/2020 RARE WBCS SEEN    Final    GRAM STAIN 06/22/2020 NO ORGANISMS SEEN    Final    Culture result: 06/22/2020 LIGHT STAPHYLOCOCCUS SPECIES, COAGULASE NEGATIVE*   Final    Culture result: 06/22/2020 FEW DIPHTHEROIDS*   Final    Culture result: 06/22/2020 FEW STREPTOCOCCI, BETA HEMOLYTIC GROUP F Penicillin and ampicillin are drugs of choice for treatment of beta-hemolytic streptococcal infections. Susceptibility testing of penicillins and beta-lactams approved by the FDA for treatment of beta-hemolytic streptococcal infections need not be performed routinely, because nonsusceptible isolates are extremely rare.  CLSI 2012*   Final    Test Description: 06/22/2020 LOWER RESPIRATORY CULTURE   Final    Reference Lab: 06/22/2020 [facility] [id number]   Final    Results: 06/22/2020 Results scanned into [facility] under Media tab    Final

## 2021-01-12 ENCOUNTER — TELEPHONE (OUTPATIENT)
Dept: FAMILY MEDICINE CLINIC | Age: 41
End: 2021-01-12

## 2021-01-12 NOTE — TELEPHONE ENCOUNTER
Two pt identifiers confirmed. Informed pharmacist of malathion lotion PA. Pt's medicaid insurance will not cover the medication due to pt's secondary insurance [employer] scripts. LPN spoke to pt's secondary insurance and they stated the medication is covered, but pt has a high deductible that must be paid. Any questions pt's needs to contact her insurance plan. Pt is made aware via [employer]. Pharmacist verbalized understanding of information discussed w/ no further questions at this time.

## 2021-06-30 ENCOUNTER — OFFICE VISIT (OUTPATIENT)
Dept: URGENT CARE | Age: 41
End: 2021-06-30
Payer: COMMERCIAL

## 2021-06-30 VITALS — HEART RATE: 76 BPM | TEMPERATURE: 98.8 F | OXYGEN SATURATION: 98 % | RESPIRATION RATE: 18 BRPM

## 2021-06-30 DIAGNOSIS — J20.9 ACUTE BRONCHITIS, UNSPECIFIED ORGANISM: ICD-10-CM

## 2021-06-30 PROCEDURE — 99213 OFFICE O/P EST LOW 20 MIN: CPT | Performed by: FAMILY MEDICINE

## 2021-06-30 RX ORDER — ALBUTEROL SULFATE 90 UG/1
2 AEROSOL, METERED RESPIRATORY (INHALATION)
Qty: 1 INHALER | Refills: 0 | Status: SHIPPED | OUTPATIENT
Start: 2021-06-30

## 2021-06-30 RX ORDER — AZITHROMYCIN 250 MG/1
TABLET, FILM COATED ORAL
Qty: 6 TABLET | Refills: 0 | Status: SHIPPED | OUTPATIENT
Start: 2021-06-30

## 2021-06-30 RX ORDER — METHYLPREDNISOLONE 4 MG/1
TABLET ORAL
Qty: 1 DOSE PACK | Refills: 0 | Status: SHIPPED | OUTPATIENT
Start: 2021-06-30

## 2021-06-30 RX ORDER — BENZONATATE 200 MG/1
200 CAPSULE ORAL
Qty: 30 CAPSULE | Refills: 0 | Status: SHIPPED | OUTPATIENT
Start: 2021-06-30

## 2021-06-30 NOTE — PROGRESS NOTES
This patient was seen at [address] [place] [address] Urgent Care while in their vehicle due to COVID-19 pandemic with PPE and focused examination in order to decrease community viral transmission. The patient/guardian gave verbal consent to treat. Cough  The history is provided by the patient. The current episode started more than 1 week ago (2 weeks +). The problem occurs constantly. The problem has been gradually worsening. The cough is productive of sputum. There has been no fever. Associated symptoms include ear congestion, sore throat and wheezing. Pertinent negatives include no chills, no headaches, no myalgias, no nausea and no vomiting. She has tried nothing for the symptoms. She is a smoker (quit 3-4 months before and now vaping ). Her past medical history is significant for bronchitis. Her past medical history does not include asthma.         Past Medical History:   Diagnosis Date    Other ill-defined conditions(799.57)     ovarian cyst     Psychiatric disorder     suicide attempt, depression         Past Surgical History:   Procedure Laterality Date    HX APPENDECTOMY      1999    HX CYST INCISION AND DRAINAGE Right 6/22/2020    RIGHT BREAST DRAINAGE OF ABSCESS (URGENT) performed by [facility]abela Solis [facility] OR    HX GYN      BTL    HX GYN      tubes tied 10/2008    HX HYSTERECTOMY           Family History   Problem Relation Age of Onset    Heart Attack Mother     Hypertension Father     No Known Problems Sister     Cancer Maternal Aunt     Breast Cancer Maternal Aunt     No Known Problems Maternal Uncle     No Known Problems Paternal Aunt     Cancer Paternal Uncle         testicular cancer    Cancer Maternal Grandmother         lung/leukemia    Breast Cancer Maternal Grandmother     Diabetes Maternal Grandfather     No Known Problems Paternal Grandmother     Stroke Paternal Grandfather     Asthma Son     Breast Cancer Maternal Aunt         Social History Socioeconomic History    Marital status: SINGLE     Spouse name: Not on file    Number of children: Not on file    Years of education: Not on file    Highest education level: Not on file   Occupational History    Not on file   Tobacco Use    Smoking status: Current Every Day Smoker     Packs/day: 1.00     Years: 15.00     Pack years: 15.00     Types: Cigarettes    Smokeless tobacco: Never Used   Substance and Sexual Activity    Alcohol use: Yes     Alcohol/week: 1.0 standard drinks     Types: 1 Cans of beer per week     Comment: occasional-weekends    Drug use: Never    Sexual activity: Yes     Birth control/protection: Surgical     Comment:     Other Topics Concern    Not on file   Social History Narrative    Not on file     Social Determinants of Health     Financial Resource Strain:     Difficulty of Paying Living Expenses:    Food Insecurity:     Worried About Running Out of Food in the Last Year:     [address] in the Last Year:    Transportation Needs:     Lack of Transportation (Medical):  Lack of Transportation (Non-Medical):    Physical Activity:     Days of Exercise per Week:     Minutes of Exercise per Session:    Stress:     Feeling of Stress :    Social Connections:     Frequency of Communication with Friends and Family:     Frequency of Social Gatherings with Friends and Family:     Attends [employer] Services:     Active Member of Clubs or Organizations:     Attends Club or Organization Meetings:     Marital Status:    Intimate Partner Violence:     Fear of Current or Ex-Partner:     Emotionally Abused:     Physically Abused:     Sexually Abused: ALLERGIES: Beef containing products and Codeine    Review of Systems   Constitutional: Negative for chills. HENT: Positive for sore throat. Respiratory: Positive for cough and wheezing. Gastrointestinal: Negative for nausea and vomiting. Musculoskeletal: Negative for myalgias. Neurological: Negative for headaches. All other systems reviewed and are negative. Vitals:    06/30/21 1353   Pulse: 76   Resp: 18   Temp: 98.8 °F (37.1 °C)   SpO2: 98%       Physical Exam  Vitals and nursing note reviewed. Constitutional:       General: She is not in acute distress. Appearance: She is not ill-appearing. Pulmonary:      Effort: Pulmonary effort is normal. No respiratory distress. Breath sounds: Decreased breath sounds and rhonchi present. No wheezing. MDM    Procedures      ICD-10-CM ICD-9-CM    1. Acute bronchitis, unspecified organism  J20.9 466.0      Medications Ordered Today   Medications    methylPREDNISolone (Medrol, Akhil,) 4 mg tablet     Sig: As directed     Dispense:  1 Dose Pack     Refill:  0    azithromycin (ZITHROMAX) 250 mg tablet     Sig: Take two tablets today then one tablet daily     Dispense:  6 Tablet     Refill:  0    albuterol (PROVENTIL HFA, VENTOLIN HFA, PROAIR HFA) 90 mcg/actuation inhaler     Sig: Take 2 Puffs by inhalation every six (6) hours as needed for Wheezing. Dispense:  1 Inhaler     Refill:  0    benzonatate (TESSALON) 200 mg capsule     Sig: Take 1 Capsule by mouth three (3) times daily as needed for Cough. Dispense:  30 Capsule     Refill:  0     No results found for any visits on 06/30/21. The patients condition was discussed with the patient and they understand. The patient is to follow up with primary care doctor. If signs and symptoms become worse the pt is to go to the ER. The patient is to take medications as prescribed.

## 2022-09-08 ENCOUNTER — HOSPITAL ENCOUNTER (EMERGENCY)
Age: 42
Discharge: HOME | End: 2022-09-08
Attending: EMERGENCY MEDICINE
Payer: MEDICAID

## 2022-09-08 ENCOUNTER — APPOINTMENT (OUTPATIENT)
Dept: GENERAL RADIOLOGY | Age: 42
End: 2022-09-08
Attending: EMERGENCY MEDICINE
Payer: MEDICAID

## 2022-09-08 VITALS
DIASTOLIC BLOOD PRESSURE: 96 MMHG | HEIGHT: 67 IN | BODY MASS INDEX: 26.26 KG/M2 | SYSTOLIC BLOOD PRESSURE: 155 MMHG | RESPIRATION RATE: 16 BRPM | HEART RATE: 74 BPM | TEMPERATURE: 97.8 F | WEIGHT: 167.33 LBS | OXYGEN SATURATION: 98 %

## 2022-09-08 DIAGNOSIS — R07.9 ACUTE CHEST PAIN: Primary | ICD-10-CM

## 2022-09-08 DIAGNOSIS — R09.1 PLEURISY: ICD-10-CM

## 2022-09-08 LAB
ALBUMIN SERPL-MCNC: 3.8 G/DL (ref 3.5–5)
ALBUMIN/GLOB SERPL: 1.1 {RATIO} (ref 1.1–2.2)
ALP SERPL-CCNC: 95 U/L (ref 45–117)
ALT SERPL-CCNC: 36 U/L (ref 12–78)
ANION GAP SERPL CALC-SCNC: 5 MMOL/L (ref 5–15)
AST SERPL-CCNC: 35 U/L (ref 15–37)
BASOPHILS # BLD: 0.2 K/UL (ref 0–0.1)
BASOPHILS NFR BLD: 1 % (ref 0–1)
BILIRUB SERPL-MCNC: 0.4 MG/DL (ref 0.2–1)
BNP SERPL-MCNC: 107 PG/ML
BUN SERPL-MCNC: 6 MG/DL (ref 6–20)
BUN/CREAT SERPL: 7 (ref 12–20)
CALCIUM SERPL-MCNC: 9.2 MG/DL (ref 8.5–10.1)
CHLORIDE SERPL-SCNC: 105 MMOL/L (ref 97–108)
CO2 SERPL-SCNC: 26 MMOL/L (ref 21–32)
CREAT SERPL-MCNC: 0.82 MG/DL (ref 0.55–1.02)
D DIMER PPP FEU-MCNC: 0.31 MG/L FEU (ref 0–0.65)
DIFFERENTIAL METHOD BLD: ABNORMAL
EOSINOPHIL # BLD: 0.2 K/UL (ref 0–0.4)
EOSINOPHIL NFR BLD: 1 % (ref 0–7)
ERYTHROCYTE [DISTWIDTH] IN BLOOD BY AUTOMATED COUNT: 13.2 % (ref 11.5–14.5)
GLOBULIN SER CALC-MCNC: 3.5 G/DL (ref 2–4)
GLUCOSE SERPL-MCNC: 84 MG/DL (ref 65–100)
HCT VFR BLD AUTO: 41.8 % (ref 35–47)
HGB BLD-MCNC: 14.5 G/DL (ref 11.5–16)
IMM GRANULOCYTES # BLD AUTO: 0 K/UL (ref 0–0.04)
IMM GRANULOCYTES NFR BLD AUTO: 0 % (ref 0–0.5)
LYMPHOCYTES # BLD: 2.5 K/UL (ref 0.8–3.5)
LYMPHOCYTES NFR BLD: 16 % (ref 12–49)
MCH RBC QN AUTO: 34 PG (ref 26–34)
MCHC RBC AUTO-ENTMCNC: 34.7 G/DL (ref 30–36.5)
MCV RBC AUTO: 97.9 FL (ref 80–99)
MONOCYTES # BLD: 0.6 K/UL (ref 0–1)
MONOCYTES NFR BLD: 4 % (ref 5–13)
NEUTS SEG # BLD: 12.1 K/UL (ref 1.8–8)
NEUTS SEG NFR BLD: 78 % (ref 32–75)
NRBC # BLD: 0 K/UL (ref 0–0.01)
NRBC BLD-RTO: 0 PER 100 WBC
PLATELET # BLD AUTO: 277 K/UL (ref 150–400)
POTASSIUM SERPL-SCNC: 4.5 MMOL/L (ref 3.5–5.1)
PROT SERPL-MCNC: 7.3 G/DL (ref 6.4–8.2)
RBC # BLD AUTO: 4.27 M/UL (ref 3.8–5.2)
RBC MORPH BLD: ABNORMAL
SODIUM SERPL-SCNC: 136 MMOL/L (ref 136–145)
TROPONIN-HIGH SENSITIVITY: <4 NG/L (ref 0–51)
TROPONIN-HIGH SENSITIVITY: <4 NG/L (ref 0–51)
WBC # BLD AUTO: 15.6 K/UL (ref 3.6–11)

## 2022-09-08 PROCEDURE — 93005 ELECTROCARDIOGRAM TRACING: CPT

## 2022-09-08 PROCEDURE — 74011250636 HC RX REV CODE- 250/636: Performed by: EMERGENCY MEDICINE

## 2022-09-08 PROCEDURE — 85379 FIBRIN DEGRADATION QUANT: CPT

## 2022-09-08 PROCEDURE — 83880 ASSAY OF NATRIURETIC PEPTIDE: CPT

## 2022-09-08 PROCEDURE — 96374 THER/PROPH/DIAG INJ IV PUSH: CPT

## 2022-09-08 PROCEDURE — 85025 COMPLETE CBC W/AUTO DIFF WBC: CPT

## 2022-09-08 PROCEDURE — 84484 ASSAY OF TROPONIN QUANT: CPT

## 2022-09-08 PROCEDURE — 99285 EMERGENCY DEPT VISIT HI MDM: CPT

## 2022-09-08 PROCEDURE — 71046 X-RAY EXAM CHEST 2 VIEWS: CPT

## 2022-09-08 PROCEDURE — 80053 COMPREHEN METABOLIC PANEL: CPT

## 2022-09-08 PROCEDURE — 36415 COLL VENOUS BLD VENIPUNCTURE: CPT

## 2022-09-08 PROCEDURE — 74011250637 HC RX REV CODE- 250/637: Performed by: EMERGENCY MEDICINE

## 2022-09-08 RX ORDER — KETOROLAC TROMETHAMINE 30 MG/ML
15 INJECTION, SOLUTION INTRAMUSCULAR; INTRAVENOUS ONCE
Status: COMPLETED | OUTPATIENT
Start: 2022-09-08 | End: 2022-09-08

## 2022-09-08 RX ORDER — NAPROXEN 500 MG/1
500 TABLET ORAL 2 TIMES DAILY WITH MEALS
Qty: 20 TABLET | Refills: 0 | Status: SHIPPED | OUTPATIENT
Start: 2022-09-08 | End: 2022-09-18

## 2022-09-08 RX ORDER — ACETAMINOPHEN 325 MG/1
650 TABLET ORAL
Qty: 20 TABLET | Refills: 0 | Status: SHIPPED | OUTPATIENT
Start: 2022-09-08

## 2022-09-08 RX ORDER — ACETAMINOPHEN 500 MG
1000 TABLET ORAL ONCE
Status: COMPLETED | OUTPATIENT
Start: 2022-09-08 | End: 2022-09-08

## 2022-09-08 RX ADMIN — KETOROLAC TROMETHAMINE 15 MG: 30 INJECTION, SOLUTION INTRAMUSCULAR at 13:46

## 2022-09-08 RX ADMIN — ACETAMINOPHEN 1000 MG: 500 TABLET ORAL at 13:47

## 2022-09-08 NOTE — Clinical Note
[address]  [facility] EMERGENCY DEPT  [address] [facility] [address]  [phone]    Work/School Note    Date: 9/8/2022    To Whom It May concern: Rufino Viera was seen and treated today in the emergency room by the following provider(s):  Attending Provider: Susy oBo MD.      Rufino Viera is excused from work/school on 09/08/22. She is clear to return to work/school on 09/09/22.         Sincerely,          Felisha An RN

## 2022-09-08 NOTE — Clinical Note
[address]  [facility] EMERGENCY DEPT  [address] [place] [address]  [phone]    Work/School Note    Date: 9/8/2022    To Whom It May concern: Ping Weiss was seen and treated today in the emergency room by the following provider(s):  Attending Provider: Preston Butterfield MD.      Ping Weiss is excused from work/school on 09/08/22 and 09/09/22. She is medically clear to return to work/school on 9/10/2022.        Sincerely,          Yolanda Mckay MD

## 2022-09-08 NOTE — ED PROVIDER NOTES
EMERGENCY DEPARTMENT HISTORY AND PHYSICAL EXAM      Date: 9/8/2022  Patient Name: Jeronimo Hamilton    History of Presenting Illness     Chief Complaint   Patient presents with    Chest Pain     Started Sunday, then stopped, started again more consistent today with heaviness, started mid-left chest into arm and into neck. History Provided By: Patient    HPI: Jeronimo Hamilton, 39 y.o. female with a past medical history significant for medical problems as stated below presents to the ED with cc of presenting with substernal chest pain over the last 2 to 3 days. Pain has been relatively constant and was intermittent yesterday. The pain is worse with deep inspiration and better at rest.  It is not exertional but does radiate to the neck at times. No diaphoresis or nausea. No lower extremity swelling. Patient does take hormone replacement therapy and is a smoker. No other associated symptoms. No other exacerbating or mounting factors. There are no other complaints, changes, or physical findings at this time. PCP: Michael Bejarano MD    No current facility-administered medications on file prior to encounter. Current Outpatient Medications on File Prior to Encounter   Medication Sig Dispense Refill    methylPREDNISolone (Medrol, Akhil,) 4 mg tablet As directed 1 Dose Pack 0    azithromycin (ZITHROMAX) 250 mg tablet Take two tablets today then one tablet daily 6 Tablet 0    albuterol (PROVENTIL HFA, VENTOLIN HFA, PROAIR HFA) 90 mcg/actuation inhaler Take 2 Puffs by inhalation every six (6) hours as needed for Wheezing. 1 Inhaler 0    benzonatate (TESSALON) 200 mg capsule Take 1 Capsule by mouth three (3) times daily as needed for Cough. 30 Capsule 0    estradioL (ESTRACE) 1 mg tablet TAKE 1 TABLET BY MOUTH ONCE DAILY      estrogens, conjugated (CONJUGATED ESTROGENS PO) Take 1 mg by mouth daily.          Past History     Past Medical History:  Past Medical History:   Diagnosis Date    Other ill-defined conditions(799.89)     ovarian cyst     Psychiatric disorder     suicide attempt, depression        Past Surgical History:  Past Surgical History:   Procedure Laterality Date    HX APPENDECTOMY      1999    HX CYST INCISION AND DRAINAGE Right 6/22/2020    RIGHT BREAST DRAINAGE OF ABSCESS (URGENT) performed by Drea Miramontes MD at [facility] AMBULATORY OR    HX GYN      BTL    HX GYN      tubes tied 10/2008    HX HYSTERECTOMY         Family History:  Family History   Problem Relation Age of Onset    Heart Attack Mother     Hypertension Father     No Known Problems Sister     Cancer Maternal Aunt     Breast Cancer Maternal Aunt     No Known Problems Maternal Uncle     No Known Problems Paternal Aunt     Cancer Paternal Uncle         testicular cancer    Cancer Maternal Grandmother         lung/leukemia    Breast Cancer Maternal Grandmother     Diabetes Maternal Grandfather     No Known Problems Paternal Grandmother     Stroke Paternal Grandfather     Asthma Son     Breast Cancer Maternal Aunt        Social History:  Social History     Tobacco Use    Smoking status: Every Day     Packs/day: 1.00     Years: 15.00     Pack years: 15.00     Types: Cigarettes    Smokeless tobacco: Never   Substance Use Topics    Alcohol use: Yes     Alcohol/week: 1.0 standard drink     Types: 1 Cans of beer per week     Comment: occasional-weekends    Drug use: Never       Allergies: Allergies   Allergen Reactions    Beef Containing Products Other (comments)     Gi distress     Codeine Hives         Review of Systems   Review of Systems   Constitutional:  Negative for chills, diaphoresis, fatigue and fever. HENT:  Negative for ear pain and sore throat. Eyes:  Negative for pain and redness. Respiratory:  Negative for cough and shortness of breath. Cardiovascular:  Positive for chest pain. Negative for leg swelling. Gastrointestinal:  Negative for abdominal pain, diarrhea, nausea and vomiting.    Endocrine: Negative for cold intolerance and heat intolerance. Genitourinary:  Negative for flank pain and hematuria. Musculoskeletal:  Negative for back pain and neck stiffness. Skin:  Negative for rash and wound. Neurological:  Negative for dizziness, syncope and headaches. All other systems reviewed and are negative. Physical Exam   Physical Exam  Vitals and nursing note reviewed. Constitutional:       General: She is not in acute distress. Appearance: She is well-developed. She is not ill-appearing. HENT:      Head: Normocephalic and atraumatic. Mouth/Throat:      Pharynx: No oropharyngeal exudate. Eyes:      Conjunctiva/sclera: Conjunctivae normal.      Pupils: Pupils are equal, round, and reactive to light. Cardiovascular:      Rate and Rhythm: Normal rate and regular rhythm. Heart sounds: No murmur heard. Pulmonary:      Effort: Pulmonary effort is normal. No respiratory distress. Breath sounds: Normal breath sounds. No decreased breath sounds, wheezing, rhonchi or rales. Abdominal:      General: Bowel sounds are normal. There is no distension. Palpations: Abdomen is soft. Tenderness: There is no abdominal tenderness. Musculoskeletal:         General: No deformity. Normal range of motion. Cervical back: Normal range of motion. Skin:     General: Skin is warm and dry. Findings: No rash. Neurological:      Mental Status: She is alert and oriented to person, place, and time. Cranial Nerves: No cranial nerve deficit. Sensory: No sensory deficit. Motor: No weakness.       Coordination: Coordination normal.      Gait: Gait normal.   Psychiatric:         Behavior: Behavior normal.   Insert    Diagnostic Study Results     Labs -     Recent Results (from the past 24 hour(s))   EKG, 12 LEAD, INITIAL    Collection Time: 09/08/22 12:09 PM   Result Value Ref Range    Ventricular Rate 79 BPM    Atrial Rate 79 BPM    P-R Interval 124 ms    QRS Duration 92 ms    Q-T Interval 396 ms    QTC Calculation (Bezet) 454 ms    Calculated P Axis 59 degrees    Calculated R Axis 79 degrees    Calculated T Axis 52 degrees    Diagnosis       Normal sinus rhythm with sinus arrhythmia  Normal ECG  When compared with ECG of 22-FEB-2012 12:20,  No significant change was found     CBC WITH AUTOMATED DIFF    Collection Time: 09/08/22 12:23 PM   Result Value Ref Range    WBC 15.6 (H) 3.6 - 11.0 K/uL    RBC 4.27 3.80 - 5.20 M/uL    HGB 14.5 11.5 - 16.0 g/dL    HCT 41.8 35.0 - 47.0 %    MCV 97.9 80.0 - 99.0 FL    MCH 34.0 26.0 - 34.0 PG    MCHC 34.7 30.0 - 36.5 g/dL    RDW 13.2 11.5 - 14.5 %    PLATELET 015 643 - 597 K/uL    NRBC 0.0 0  WBC    ABSOLUTE NRBC 0.00 0.00 - 0.01 K/uL    NEUTROPHILS 78 (H) 32 - 75 %    LYMPHOCYTES 16 12 - 49 %    MONOCYTES 4 (L) 5 - 13 %    EOSINOPHILS 1 0 - 7 %    BASOPHILS 1 0 - 1 %    IMMATURE GRANULOCYTES 0 0.0 - 0.5 %    ABS. NEUTROPHILS 12.1 (H) 1.8 - 8.0 K/UL    ABS. LYMPHOCYTES 2.5 0.8 - 3.5 K/UL    ABS. MONOCYTES 0.6 0.0 - 1.0 K/UL    ABS. EOSINOPHILS 0.2 0.0 - 0.4 K/UL    ABS. BASOPHILS 0.2 (H) 0.0 - 0.1 K/UL    ABS. IMM. GRANS. 0.0 0.00 - 0.04 K/UL    DF SMEAR SCANNED      RBC COMMENTS NORMOCYTIC, NORMOCHROMIC     METABOLIC PANEL, COMPREHENSIVE    Collection Time: 09/08/22 12:23 PM   Result Value Ref Range    Sodium 136 136 - 145 mmol/L    Potassium 4.5 3.5 - 5.1 mmol/L    Chloride 105 97 - 108 mmol/L    CO2 26 21 - 32 mmol/L    Anion gap 5 5 - 15 mmol/L    Glucose 84 65 - 100 mg/dL    BUN 6 6 - 20 MG/DL    Creatinine 0.82 0.55 - 1.02 MG/DL    BUN/Creatinine ratio 7 (L) 12 - 20      GFR est AA >60 >60 ml/min/1.73m2    GFR est non-AA >60 >60 ml/min/1.73m2    Calcium 9.2 8.5 - 10.1 MG/DL    Bilirubin, total 0.4 0.2 - 1.0 MG/DL    ALT (SGPT) 36 12 - 78 U/L    AST (SGOT) 35 15 - 37 U/L    Alk.  phosphatase 95 45 - 117 U/L    Protein, total 7.3 6.4 - 8.2 g/dL    Albumin 3.8 3.5 - 5.0 g/dL    Globulin 3.5 2.0 - 4.0 g/dL    A-G Ratio 1.1 1.1 - 2.2     NT-PRO BNP Collection Time: 09/08/22 12:23 PM   Result Value Ref Range    NT pro- <125 PG/ML   TROPONIN-HIGH SENSITIVITY    Collection Time: 09/08/22 12:23 PM   Result Value Ref Range    Troponin-High Sensitivity <4 0 - 51 ng/L   D DIMER    Collection Time: 09/08/22  1:57 PM   Result Value Ref Range    D-dimer 0.31 0.00 - 0.65 mg/L FEU       Radiologic Studies -   XR CHEST PA LAT   Final Result   Negative. CT Results  (Last 48 hours)      None          CXR Results  (Last 48 hours)                 09/08/22 1247  XR CHEST PA LAT Final result    Impression:  Negative. Narrative:  Clinical indication: Chest pain. Frontal and lateral views of the chest obtained, comparison October 24, 2016. The a heart size is normal. There is no acute infiltrate. Medical Decision Making   I am the first provider for this patient. I reviewed the vital signs, available nursing notes, past medical history, past surgical history, family history and social history. Vital Signs-Reviewed the patient's vital signs. Patient Vitals for the past 12 hrs:   Temp Pulse Resp BP SpO2   09/08/22 1201 97.8 °F (36.6 °C) 74 16 (!) 155/96 98 %       Records Reviewed: Nursing records and medical records reviewed    MDM:  Patient presents with CP. DDx:  ACS, Aortic dissection, PNA, PE, PTX, pericarditis, myocarditis, GERD, costochondritis, anxiety. Provider Notes (Medical Decision Making):   44-year-old female presenting with atypical chest pain with serial EKGs and troponins that showed no evidence of acute coronary syndrome. D-dimer is negative and a low risk patient for PE. Chest x-ray is normal.  Suspect musculoskeletal cause versus pleurisy. Return precautions given. ED Course:   Initial assessment performed. The patients presenting problems have been discussed, and they are in agreement with the care plan formulated and outlined with them.   I have encouraged them to ask questions as they arise throughout their visit. Medications Administered       acetaminophen (TYLENOL) tablet 1,000 mg       Admin Date  09/08/2022 Action  Given Dose  1,000 mg Route  Oral Administered By  Jerrod Lopez RN              ketorolac (TORADOL) injection 15 mg       Admin Date  09/08/2022 Action  Given Dose  15 mg Route  IntraVENous Administered By  Jerrod Lopez RN                        Critical Care:  None      Disposition:  3:22 PM  Lisa Flanagan's  results have been reviewed with her. She has been counseled regarding her diagnosis. She verbally conveys understanding and agreement of the signs, symptoms, diagnosis, treatment and prognosis and additionally agrees to follow up as recommended with Dr. Melo Justice MD in 24 - 48 hours. She also agrees with the care-plan and conveys that all of her questions have been answered. I have also put together some discharge instructions for her that include: 1) educational information regarding their diagnosis, 2) how to care for their diagnosis at home, as well a 3) list of reasons why they would want to return to the ED prior to their follow-up appointment, should their condition change. DISCHARGE PLAN:  1. Current Discharge Medication List        START taking these medications    Details   naproxen (Naprosyn) 500 mg tablet Take 1 Tablet by mouth two (2) times daily (with meals) for 10 days. Qty: 20 Tablet, Refills: 0  Start date: 9/8/2022, End date: 9/18/2022      acetaminophen (TYLENOL) 325 mg tablet Take 2 Tablets by mouth every four (4) hours as needed for Pain. Qty: 20 Tablet, Refills: 0  Start date: 9/8/2022           2. Follow-up Information       Follow up With Specialties Details Why Contact Info    Karlo Bejarano MD Family Medicine In 3 days For a follow-up evaluation.  [address]  [phone]      [facility] EMERGENCY DEPT Emergency Medicine In 2 days If symptoms worsen [address] [facility] [address]  [phone]          3. Return to ED if worse     Diagnosis     Clinical Impression:   1. Acute chest pain    2. Pleurisy        Attestations:    Issa Viera MD    Please note that this dictation was completed with [employer], the computer voice recognition software. Quite often unanticipated grammatical, syntax, homophones, and other interpretive errors are inadvertently transcribed by the computer software. Please disregard these errors. Please excuse any errors that have escaped final proofreading. Thank you.

## 2022-09-08 NOTE — Clinical Note
[address]  [facility] EMERGENCY DEPT  [address] [id number] [phone]    Work/School Note    Date: 9/8/2022    To Whom It May concern: Yarelis Shaw was seen and treated today in the emergency room by the following provider(s):  Attending Provider: Pia Espinoza MD.      Yarelis Shaw is excused from work/school on 09/08/22 and 09/09/22. She is medically clear to return to work/school on 9/10/2022.        Sincerely,          Betzy Melgar RN

## 2022-09-08 NOTE — DISCHARGE INSTRUCTIONS
It was a pleasure taking care of you at [facility] today. We know that when you come to [facility], you are entrusting us with your health, comfort, and safety. Our physicians and nurses honor that trust, and we truly appreciate the opportunity to care for you and your loved ones. We also value your feedback. If you receive a survey about your Emergency Department experience today, please fill it out. We care about our patients' feedback, and we listen to what you have to say. Thank you!

## 2022-09-08 NOTE — Clinical Note
[address]  [facility] EMERGENCY DEPT  [address] [facility] [address]  [phone]    Work/School Note    Date: 9/8/2022    To Whom It May concern: Nu Davis was seen and treated today in the emergency room by the following provider(s):  Attending Provider: Sony Oropeza MD.      Nu Davis is excused from work/school on 09/08/22. She is clear to return to work/school on 09/09/22.         Sincerely,          Jesu Hassan MD

## 2022-09-09 LAB
ATRIAL RATE: 73 BPM
ATRIAL RATE: 79 BPM
CALCULATED P AXIS, ECG09: 59 DEGREES
CALCULATED P AXIS, ECG09: 67 DEGREES
CALCULATED R AXIS, ECG10: 78 DEGREES
CALCULATED R AXIS, ECG10: 79 DEGREES
CALCULATED T AXIS, ECG11: 48 DEGREES
CALCULATED T AXIS, ECG11: 52 DEGREES
DIAGNOSIS, 93000: NORMAL
DIAGNOSIS, 93000: NORMAL
P-R INTERVAL, ECG05: 124 MS
P-R INTERVAL, ECG05: 128 MS
Q-T INTERVAL, ECG07: 396 MS
Q-T INTERVAL, ECG07: 406 MS
QRS DURATION, ECG06: 90 MS
QRS DURATION, ECG06: 92 MS
QTC CALCULATION (BEZET), ECG08: 447 MS
QTC CALCULATION (BEZET), ECG08: 454 MS
VENTRICULAR RATE, ECG03: 73 BPM
VENTRICULAR RATE, ECG03: 79 BPM

## 2022-11-15 RX ORDER — IBUPROFEN 600 MG/1
800 TABLET ORAL
COMMUNITY

## 2022-11-15 RX ORDER — BISMUTH SUBSALICYLATE 262 MG
1 TABLET,CHEWABLE ORAL DAILY
COMMUNITY

## 2022-11-15 NOTE — PERIOP NOTES
[address] INSTRUCTIONS    Surgery Date:   11/16/22    Your surgeon's office or [facility] staff will call you between 4 PM- 8 PM the day before surgery with your arrival time. If your surgery is on a Monday, you will receive a call the preceding Friday. Please report to [facility] Patient Access/Admitting on the 1st floor. Bring your insurance card, photo identification, and any copayment ( if applicable). If you are going home the same day of your surgery, you must have a responsible adult to drive you home. You need to have a responsible adult to stay with you the first 24 hours after surgery and you should not drive a car for 24 hours following your surgery. Do NOT eat any solid foods after midnight the night before surgery including candy, mint or gum. You may drink clear liquids from midnight until 1 hour prior to your arrival. You may drink up to 12 ounces at one time every 4 hours. Please note special instructions, if applicable, below for medications. Do NOT drink alcohol or smoke 24 hours before surgery. STOP smoking for 14 days prior as it helps with breathing and healing after surgery. If you are being admitted to the hospital, please leave personal belongings/luggage in your car until you have an assigned hospital room number. Please wear comfortable clothes. Wear your glasses instead of contacts. We ask that all money, jewelry and valuables be left at home. Wear no make up, particularly mascara, the day of surgery. All body piercings, rings, and jewelry need to be removed and left at home. Please remove any nail polish or artifical nails from your fingernails. Please wear your hair loose or down. Please no pony-tails, buns, or any metal hair accessories. If you shower the morning of surgery, please do not apply any lotions or powders afterwards. You may wear deodorant, unless having breast surgery. Do not shave any body area within 24 hours of your surgery.   Please follow all instructions to avoid any potential surgical cancellation. Should your physical condition change, (i.e. fever, cold, flu, etc.) please notify your surgeon as soon as possible. It is important to be on time. If a situation occurs where you may be delayed, please call:  [phone] / [phone] on the day of surgery. The Preadmission Testing staff can be reached at [phone]. Special instructions: NONE      No current facility-administered medications for this encounter. Current Outpatient Medications   Medication Sig    OTHER PROGESTERONE  20MG DAILY    multivitamin (ONE A DAY) tablet Take 1 Tablet by mouth daily. ibuprofen (MOTRIN) 600 mg tablet Take 800 mg by mouth two (2) times daily as needed for Pain. YOU MUST ONLY TAKE THESE MEDICATIONS THE MORNING OF SURGERY WITH A SIP OF WATER: NONE  MEDICATIONS TO TAKE THE MORNING OF SURGERY ONLY IF NEEDED: NONE  HOLD these prescription medications BEFORE Surgery: NONE  Ask your surgeon/prescribing physician about when/if to STOP taking these medications: NONE  Stop all vitamins, herbal medicines and Aspirin containing products 7 days prior to surgery. Stop any non-steroidal anti-inflammatory drugs (i.e. Ibuprofen, Naproxen, Advil, Aleve) 3 days before surgery. You may take Tylenol. If you are currently taking Plavix, Coumadin,or any other blood-thinning/anticoagulant medication contact your prescribing physician for instructions. Eating and Drinking Before Surgery    You may eat a regular dinner at the usual time on the day before your surgery. Do NOT eat any solid foods after midnight unless your arrival time at the hospital is 3pm or later. You may drink clear liquids only from 12 midnight until 1 hours prior to your arrival time at the hospital on the day of your surgery. Do NOT drink alcohol.   Clear liquids include:  Water  Fruit juices without pulp( i.e. apple juice)  Carbonated beverages  Black coffee (no cream/milk)  Tea (no cream/milk)  Gatorade  You may drink up to 12-16 ounces at one time every 4 hours between the hours of midnight and 1 hour before your arrival time at the hospital. Example- if your arrival time at the hospital is 6am, you may drink 12-16 ounces of clear liquids no later than 5am.  If your arrival time at the hospital is 3pm or later, you may eat a light breakfast before 8am.  A light breakfast includes: Toast or bagel (no butter)  Black coffee (no cream/milk)  Tea (no cream/milk)  Fruit juices without pulp ( i.e. apple juice)  Do NOT eat meat, eggs, vegetables or fruit  If you have any questions, please contact your surgeon's office. Preventing Infections Before and After - Your Surgery    IMPORTANT INSTRUCTIONS    You play an important role in your health and preparation for surgery. To reduce the germs on your skin you will need to shower with CHG soap (Chorhexidine gluconate 4%) two times before surgery. CHG soap (Hibiclens, Hex-A-Clens or store brand)  CHG soap will be provided at your Preadmission Testing (PAT) appointment. If you do not have a PAT appointment before surgery, you may arrange to  CHG soap from our office or purchase CHG soap at a pharmacy, grocery or department store. You need to purchase TWO 4 ounce bottles to use for your 2 showers. Steps to follow:  Paolo Honer your hair with your normal shampoo and your body with regular soap and rinse well to remove shampoo and soap from your skin. Wet a clean washcloth and turn off the shower. Put CHG soap on washcloth and apply to your entire body from the neck down. Do not use on your head, face or private parts(genitals). Do not use CHG soap on open sores, wounds or areas of skin irritation. Wash you body gently for 5 minutes. Do not wash your skin too hard. This soap does not create lather. Pay special attention to your underarms and from your belly button to your feet.   Turn the shower back on and rinse well to get CHG soap off your body. Pat your skin dry with a clean, dry towel. Do not apply lotions or moisturizer. Put on clean clothes and sleep on fresh bed sheets and do not allow pets to sleep with you. Shower with CHG soap 2 times before your surgery  The evening before your surgery  The morning of your surgery      Tips to help prevent infections after your surgery:  Protect your surgical wound from germs:  Hand washing is the most important thing you and your caregivers can do to prevent infections. Keep your bandage clean and dry! Do not touch your surgical wound. Use clean, freshly washed towels and washcloths every time you shower; do not share bath linens with others. Until your surgical wound is healed, wear clothing and sleep on bed linens each day that are clean and freshly washed. Do not allow pets to sleep in your bed with you or touch your surgical wound. Do not smoke - smoking delays wound healing. This may be a good time to stop smoking. If you have diabetes, it is important for you to manage your blood sugar levels properly before your surgery as well as after your surgery. Poorly managed blood sugar levels slow down wound healing and prevent you from healing completely. Patient Information Regarding COVID Restrictions      Day of Procedure    Please park in the parking deck or any designated visitor parking lot. Enter the facility through the Main Entrance of the hospital.  On the day of surgery, please provide the cell phone number of the person who will be waiting for you to the Patient Access representative at the time of registration. Masks are highly recommended in the hospital, but not required. Once your procedure and the immediate recovery period is completed, a nurse in the recovery area will contact your designated visitor to inform them of your room number or to otherwise review other pertinent information regarding your care.     Social distancing practices are strongly encouraged in waiting areas and the cafeteria. The patient was contacted  via phone. She verbalized understanding of all instructions does not  need reinforcement.

## 2022-11-16 ENCOUNTER — HOSPITAL ENCOUNTER (OUTPATIENT)
Age: 42
Setting detail: OUTPATIENT SURGERY
Discharge: HOME | End: 2022-11-16
Attending: SPECIALIST | Admitting: SPECIALIST
Payer: MEDICAID

## 2022-11-16 ENCOUNTER — ANESTHESIA (OUTPATIENT)
Dept: SURGERY | Age: 42
End: 2022-11-16
Payer: MEDICAID

## 2022-11-16 ENCOUNTER — ANESTHESIA EVENT (OUTPATIENT)
Dept: SURGERY | Age: 42
End: 2022-11-16
Payer: MEDICAID

## 2022-11-16 VITALS
RESPIRATION RATE: 16 BRPM | WEIGHT: 167.99 LBS | TEMPERATURE: 97.6 F | DIASTOLIC BLOOD PRESSURE: 68 MMHG | BODY MASS INDEX: 26.37 KG/M2 | HEART RATE: 64 BPM | SYSTOLIC BLOOD PRESSURE: 108 MMHG | HEIGHT: 67 IN | OXYGEN SATURATION: 99 %

## 2022-11-16 DIAGNOSIS — R52 PAIN: Primary | ICD-10-CM

## 2022-11-16 PROBLEM — R10.2 PELVIC PAIN IN FEMALE: Status: ACTIVE | Noted: 2022-11-16

## 2022-11-16 LAB — HCG UR QL: NEGATIVE

## 2022-11-16 PROCEDURE — 74011000250 HC RX REV CODE- 250: Performed by: STUDENT IN AN ORGANIZED HEALTH CARE EDUCATION/TRAINING PROGRAM

## 2022-11-16 PROCEDURE — 74011250636 HC RX REV CODE- 250/636: Performed by: STUDENT IN AN ORGANIZED HEALTH CARE EDUCATION/TRAINING PROGRAM

## 2022-11-16 PROCEDURE — 77030041523 HC SEALNT FIBRN VITASEAL J&J -E: Performed by: SPECIALIST

## 2022-11-16 PROCEDURE — 77030008684 HC TU ET CUF COVD -B: Performed by: STUDENT IN AN ORGANIZED HEALTH CARE EDUCATION/TRAINING PROGRAM

## 2022-11-16 PROCEDURE — 77030031492 HC PRT ACC BLNT AIRSEAL CNMD -B: Performed by: SPECIALIST

## 2022-11-16 PROCEDURE — 77030039895 HC SYST SMK EVAC LAP COVD -B: Performed by: SPECIALIST

## 2022-11-16 PROCEDURE — 76210000016 HC OR PH I REC 1 TO 1.5 HR: Performed by: SPECIALIST

## 2022-11-16 PROCEDURE — 77030039147 HC PWDR HEMSTS SURGICEL JNJ -D: Performed by: SPECIALIST

## 2022-11-16 PROCEDURE — 77030026438 HC STYL ET INTUB CARD -A: Performed by: STUDENT IN AN ORGANIZED HEALTH CARE EDUCATION/TRAINING PROGRAM

## 2022-11-16 PROCEDURE — 77030020703 HC SEAL CANN DISP INTU -B: Performed by: SPECIALIST

## 2022-11-16 PROCEDURE — 77030035236 HC SUT PDS STRATFX BARB J&J -B: Performed by: SPECIALIST

## 2022-11-16 PROCEDURE — G0378 HOSPITAL OBSERVATION PER HR: HCPCS

## 2022-11-16 PROCEDURE — 77030003578 HC NDL INSUF VERES AMR -B: Performed by: SPECIALIST

## 2022-11-16 PROCEDURE — 74011250636 HC RX REV CODE- 250/636: Performed by: ANESTHESIOLOGY

## 2022-11-16 PROCEDURE — 77030018778 HC MANIP UTER VCAR CNMD -B: Performed by: SPECIALIST

## 2022-11-16 PROCEDURE — 77030035277 HC OBTRTR BLDELSS DISP INTU -B: Performed by: SPECIALIST

## 2022-11-16 PROCEDURE — 76210000020 HC REC RM PH II FIRST 0.5 HR: Performed by: SPECIALIST

## 2022-11-16 PROCEDURE — 77030002933 HC SUT MCRYL J&J -A: Performed by: SPECIALIST

## 2022-11-16 PROCEDURE — 76060000033 HC ANESTHESIA 1 TO 1.5 HR: Performed by: SPECIALIST

## 2022-11-16 PROCEDURE — 2709999900 HC NON-CHARGEABLE SUPPLY: Performed by: SPECIALIST

## 2022-11-16 PROCEDURE — 77030041236 HC APPL SURG ENDO JNJ -B: Performed by: SPECIALIST

## 2022-11-16 PROCEDURE — 74011000250 HC RX REV CODE- 250: Performed by: SPECIALIST

## 2022-11-16 PROCEDURE — 88307 TISSUE EXAM BY PATHOLOGIST: CPT

## 2022-11-16 PROCEDURE — 74011250637 HC RX REV CODE- 250/637: Performed by: STUDENT IN AN ORGANIZED HEALTH CARE EDUCATION/TRAINING PROGRAM

## 2022-11-16 PROCEDURE — 76010000934 HC OR TIME 1 TO 1.5HR INTENSV - TIER 2: Performed by: SPECIALIST

## 2022-11-16 PROCEDURE — 81025 URINE PREGNANCY TEST: CPT

## 2022-11-16 RX ORDER — HYDROMORPHONE HYDROCHLORIDE 1 MG/ML
0.2 INJECTION, SOLUTION INTRAMUSCULAR; INTRAVENOUS; SUBCUTANEOUS
Status: DISCONTINUED | OUTPATIENT
Start: 2022-11-16 | End: 2022-11-16 | Stop reason: HOSPADM

## 2022-11-16 RX ORDER — OXYCODONE AND ACETAMINOPHEN 5; 325 MG/1; MG/1
1 TABLET ORAL AS NEEDED
Status: DISCONTINUED | OUTPATIENT
Start: 2022-11-16 | End: 2022-11-16 | Stop reason: HOSPADM

## 2022-11-16 RX ORDER — GLYCOPYRROLATE 0.2 MG/ML
INJECTION INTRAMUSCULAR; INTRAVENOUS AS NEEDED
Status: DISCONTINUED | OUTPATIENT
Start: 2022-11-16 | End: 2022-11-16 | Stop reason: HOSPADM

## 2022-11-16 RX ORDER — KETAMINE HYDROCHLORIDE 10 MG/ML
INJECTION, SOLUTION INTRAMUSCULAR; INTRAVENOUS AS NEEDED
Status: DISCONTINUED | OUTPATIENT
Start: 2022-11-16 | End: 2022-11-16 | Stop reason: HOSPADM

## 2022-11-16 RX ORDER — METRONIDAZOLE 500 MG/100ML
INJECTION, SOLUTION INTRAVENOUS AS NEEDED
Status: DISCONTINUED | OUTPATIENT
Start: 2022-11-16 | End: 2022-11-16 | Stop reason: HOSPADM

## 2022-11-16 RX ORDER — DIPHENHYDRAMINE HYDROCHLORIDE 50 MG/ML
12.5 INJECTION, SOLUTION INTRAMUSCULAR; INTRAVENOUS AS NEEDED
Status: DISCONTINUED | OUTPATIENT
Start: 2022-11-16 | End: 2022-11-16 | Stop reason: HOSPADM

## 2022-11-16 RX ORDER — LIDOCAINE HYDROCHLORIDE 20 MG/ML
INJECTION, SOLUTION EPIDURAL; INFILTRATION; INTRACAUDAL; PERINEURAL AS NEEDED
Status: DISCONTINUED | OUTPATIENT
Start: 2022-11-16 | End: 2022-11-16 | Stop reason: HOSPADM

## 2022-11-16 RX ORDER — DEXAMETHASONE SODIUM PHOSPHATE 4 MG/ML
INJECTION, SOLUTION INTRA-ARTICULAR; INTRALESIONAL; INTRAMUSCULAR; INTRAVENOUS; SOFT TISSUE AS NEEDED
Status: DISCONTINUED | OUTPATIENT
Start: 2022-11-16 | End: 2022-11-16 | Stop reason: HOSPADM

## 2022-11-16 RX ORDER — MIDAZOLAM HYDROCHLORIDE 1 MG/ML
1 INJECTION, SOLUTION INTRAMUSCULAR; INTRAVENOUS AS NEEDED
Status: DISCONTINUED | OUTPATIENT
Start: 2022-11-16 | End: 2022-11-16 | Stop reason: HOSPADM

## 2022-11-16 RX ORDER — CEFAZOLIN SODIUM 1 G/3ML
INJECTION, POWDER, FOR SOLUTION INTRAMUSCULAR; INTRAVENOUS AS NEEDED
Status: DISCONTINUED | OUTPATIENT
Start: 2022-11-16 | End: 2022-11-16 | Stop reason: HOSPADM

## 2022-11-16 RX ORDER — FENTANYL CITRATE 50 UG/ML
INJECTION, SOLUTION INTRAMUSCULAR; INTRAVENOUS AS NEEDED
Status: DISCONTINUED | OUTPATIENT
Start: 2022-11-16 | End: 2022-11-16 | Stop reason: HOSPADM

## 2022-11-16 RX ORDER — FENTANYL CITRATE 50 UG/ML
25 INJECTION, SOLUTION INTRAMUSCULAR; INTRAVENOUS
Status: COMPLETED | OUTPATIENT
Start: 2022-11-16 | End: 2022-11-16

## 2022-11-16 RX ORDER — SODIUM CHLORIDE 0.9 % (FLUSH) 0.9 %
5-40 SYRINGE (ML) INJECTION EVERY 8 HOURS
Status: DISCONTINUED | OUTPATIENT
Start: 2022-11-16 | End: 2022-11-16 | Stop reason: HOSPADM

## 2022-11-16 RX ORDER — MORPHINE SULFATE 2 MG/ML
2 INJECTION, SOLUTION INTRAMUSCULAR; INTRAVENOUS
Status: DISCONTINUED | OUTPATIENT
Start: 2022-11-16 | End: 2022-11-16 | Stop reason: HOSPADM

## 2022-11-16 RX ORDER — SODIUM CHLORIDE 0.9 % (FLUSH) 0.9 %
5-40 SYRINGE (ML) INJECTION AS NEEDED
Status: DISCONTINUED | OUTPATIENT
Start: 2022-11-16 | End: 2022-11-16 | Stop reason: HOSPADM

## 2022-11-16 RX ORDER — SODIUM CHLORIDE, SODIUM LACTATE, POTASSIUM CHLORIDE, CALCIUM CHLORIDE 600; 310; 30; 20 MG/100ML; MG/100ML; MG/100ML; MG/100ML
75 INJECTION, SOLUTION INTRAVENOUS CONTINUOUS
Status: DISCONTINUED | OUTPATIENT
Start: 2022-11-16 | End: 2022-11-16 | Stop reason: HOSPADM

## 2022-11-16 RX ORDER — ALBUTEROL SULFATE 90 UG/1
AEROSOL, METERED RESPIRATORY (INHALATION) AS NEEDED
Status: DISCONTINUED | OUTPATIENT
Start: 2022-11-16 | End: 2022-11-16 | Stop reason: HOSPADM

## 2022-11-16 RX ORDER — IBUPROFEN 800 MG/1
800 TABLET ORAL
Qty: 40 TABLET | Refills: 5 | Status: SHIPPED | OUTPATIENT
Start: 2022-11-16

## 2022-11-16 RX ORDER — SODIUM CHLORIDE 9 MG/ML
50 INJECTION, SOLUTION INTRAVENOUS CONTINUOUS
Status: DISCONTINUED | OUTPATIENT
Start: 2022-11-16 | End: 2022-11-16 | Stop reason: HOSPADM

## 2022-11-16 RX ORDER — LIDOCAINE HYDROCHLORIDE 10 MG/ML
0.1 INJECTION, SOLUTION EPIDURAL; INFILTRATION; INTRACAUDAL; PERINEURAL AS NEEDED
Status: DISCONTINUED | OUTPATIENT
Start: 2022-11-16 | End: 2022-11-16 | Stop reason: HOSPADM

## 2022-11-16 RX ORDER — SUCCINYLCHOLINE CHLORIDE 20 MG/ML
INJECTION INTRAMUSCULAR; INTRAVENOUS AS NEEDED
Status: DISCONTINUED | OUTPATIENT
Start: 2022-11-16 | End: 2022-11-16 | Stop reason: HOSPADM

## 2022-11-16 RX ORDER — MIDAZOLAM HYDROCHLORIDE 1 MG/ML
0.5 INJECTION, SOLUTION INTRAMUSCULAR; INTRAVENOUS
Status: DISCONTINUED | OUTPATIENT
Start: 2022-11-16 | End: 2022-11-16 | Stop reason: HOSPADM

## 2022-11-16 RX ORDER — FENTANYL CITRATE 50 UG/ML
50 INJECTION, SOLUTION INTRAMUSCULAR; INTRAVENOUS AS NEEDED
Status: DISCONTINUED | OUTPATIENT
Start: 2022-11-16 | End: 2022-11-16 | Stop reason: HOSPADM

## 2022-11-16 RX ORDER — KETOROLAC TROMETHAMINE 30 MG/ML
INJECTION, SOLUTION INTRAMUSCULAR; INTRAVENOUS AS NEEDED
Status: DISCONTINUED | OUTPATIENT
Start: 2022-11-16 | End: 2022-11-16 | Stop reason: HOSPADM

## 2022-11-16 RX ORDER — DEXMEDETOMIDINE HYDROCHLORIDE 100 UG/ML
INJECTION, SOLUTION INTRAVENOUS AS NEEDED
Status: DISCONTINUED | OUTPATIENT
Start: 2022-11-16 | End: 2022-11-16 | Stop reason: HOSPADM

## 2022-11-16 RX ORDER — SODIUM CHLORIDE, SODIUM LACTATE, POTASSIUM CHLORIDE, CALCIUM CHLORIDE 600; 310; 30; 20 MG/100ML; MG/100ML; MG/100ML; MG/100ML
INJECTION, SOLUTION INTRAVENOUS
Status: DISCONTINUED | OUTPATIENT
Start: 2022-11-16 | End: 2022-11-16 | Stop reason: HOSPADM

## 2022-11-16 RX ORDER — MIDAZOLAM HYDROCHLORIDE 1 MG/ML
INJECTION, SOLUTION INTRAMUSCULAR; INTRAVENOUS AS NEEDED
Status: DISCONTINUED | OUTPATIENT
Start: 2022-11-16 | End: 2022-11-16 | Stop reason: HOSPADM

## 2022-11-16 RX ORDER — ONDANSETRON 8 MG/1
8 TABLET, ORALLY DISINTEGRATING ORAL
Qty: 20 TABLET | Refills: 1 | Status: SHIPPED | OUTPATIENT
Start: 2022-11-16

## 2022-11-16 RX ORDER — DIPHENHYDRAMINE HYDROCHLORIDE 50 MG/ML
INJECTION, SOLUTION INTRAMUSCULAR; INTRAVENOUS AS NEEDED
Status: DISCONTINUED | OUTPATIENT
Start: 2022-11-16 | End: 2022-11-16 | Stop reason: HOSPADM

## 2022-11-16 RX ORDER — ONDANSETRON 2 MG/ML
4 INJECTION INTRAMUSCULAR; INTRAVENOUS AS NEEDED
Status: DISCONTINUED | OUTPATIENT
Start: 2022-11-16 | End: 2022-11-16 | Stop reason: HOSPADM

## 2022-11-16 RX ORDER — BUPIVACAINE HYDROCHLORIDE 5 MG/ML
INJECTION, SOLUTION EPIDURAL; INTRACAUDAL AS NEEDED
Status: DISCONTINUED | OUTPATIENT
Start: 2022-11-16 | End: 2022-11-16 | Stop reason: HOSPADM

## 2022-11-16 RX ORDER — OXYCODONE AND ACETAMINOPHEN 5; 325 MG/1; MG/1
1 TABLET ORAL
Qty: 20 TABLET | Refills: 0 | Status: SHIPPED | OUTPATIENT
Start: 2022-11-16 | End: 2022-11-21

## 2022-11-16 RX ORDER — ONDANSETRON 2 MG/ML
INJECTION INTRAMUSCULAR; INTRAVENOUS AS NEEDED
Status: DISCONTINUED | OUTPATIENT
Start: 2022-11-16 | End: 2022-11-16 | Stop reason: HOSPADM

## 2022-11-16 RX ORDER — NEOSTIGMINE METHYLSULFATE 1 MG/ML
INJECTION, SOLUTION INTRAVENOUS AS NEEDED
Status: DISCONTINUED | OUTPATIENT
Start: 2022-11-16 | End: 2022-11-16 | Stop reason: HOSPADM

## 2022-11-16 RX ORDER — HYDROMORPHONE HYDROCHLORIDE 2 MG/ML
INJECTION, SOLUTION INTRAMUSCULAR; INTRAVENOUS; SUBCUTANEOUS AS NEEDED
Status: DISCONTINUED | OUTPATIENT
Start: 2022-11-16 | End: 2022-11-16 | Stop reason: HOSPADM

## 2022-11-16 RX ORDER — ROCURONIUM BROMIDE 10 MG/ML
INJECTION, SOLUTION INTRAVENOUS AS NEEDED
Status: DISCONTINUED | OUTPATIENT
Start: 2022-11-16 | End: 2022-11-16 | Stop reason: HOSPADM

## 2022-11-16 RX ORDER — PROPOFOL 10 MG/ML
INJECTION, EMULSION INTRAVENOUS AS NEEDED
Status: DISCONTINUED | OUTPATIENT
Start: 2022-11-16 | End: 2022-11-16 | Stop reason: HOSPADM

## 2022-11-16 RX ADMIN — CEFAZOLIN 2 G: 330 INJECTION, POWDER, FOR SOLUTION INTRAMUSCULAR; INTRAVENOUS at 13:55

## 2022-11-16 RX ADMIN — SODIUM CHLORIDE, POTASSIUM CHLORIDE, SODIUM LACTATE AND CALCIUM CHLORIDE: 600; 310; 30; 20 INJECTION, SOLUTION INTRAVENOUS at 13:35

## 2022-11-16 RX ADMIN — KETOROLAC TROMETHAMINE 30 MG: 30 INJECTION, SOLUTION INTRAMUSCULAR; INTRAVENOUS at 14:34

## 2022-11-16 RX ADMIN — FENTANYL CITRATE 50 MCG: 50 INJECTION INTRAMUSCULAR; INTRAVENOUS at 13:39

## 2022-11-16 RX ADMIN — FENTANYL CITRATE 25 MCG: 0.05 INJECTION, SOLUTION INTRAMUSCULAR; INTRAVENOUS at 15:26

## 2022-11-16 RX ADMIN — ONDANSETRON 4 MG: 2 INJECTION INTRAMUSCULAR; INTRAVENOUS at 15:59

## 2022-11-16 RX ADMIN — ROCURONIUM BROMIDE 10 MG: 10 SOLUTION INTRAVENOUS at 13:39

## 2022-11-16 RX ADMIN — ALBUTEROL SULFATE 3 PUFF: 90 AEROSOL, METERED RESPIRATORY (INHALATION) at 14:19

## 2022-11-16 RX ADMIN — SUCCINYLCHOLINE CHLORIDE 140 MG: 20 INJECTION, SOLUTION INTRAMUSCULAR; INTRAVENOUS at 13:39

## 2022-11-16 RX ADMIN — HYDROMORPHONE HYDROCHLORIDE 0.2 MG: 1 INJECTION, SOLUTION INTRAMUSCULAR; INTRAVENOUS; SUBCUTANEOUS at 15:32

## 2022-11-16 RX ADMIN — HYDROMORPHONE HYDROCHLORIDE 1 MG: 2 INJECTION, SOLUTION INTRAMUSCULAR; INTRAVENOUS; SUBCUTANEOUS at 14:40

## 2022-11-16 RX ADMIN — METRONIDAZOLE 500 MG: 500 SOLUTION INTRAVENOUS at 13:55

## 2022-11-16 RX ADMIN — FENTANYL CITRATE 50 MCG: 50 INJECTION INTRAMUSCULAR; INTRAVENOUS at 14:05

## 2022-11-16 RX ADMIN — DEXMEDETOMIDINE HYDROCHLORIDE 8 MCG: 100 INJECTION, SOLUTION, CONCENTRATE INTRAVENOUS at 14:22

## 2022-11-16 RX ADMIN — FENTANYL CITRATE 25 MCG: 0.05 INJECTION, SOLUTION INTRAMUSCULAR; INTRAVENOUS at 15:10

## 2022-11-16 RX ADMIN — Medication 30 MG: at 14:02

## 2022-11-16 RX ADMIN — DEXAMETHASONE SODIUM PHOSPHATE 10 MG: 4 INJECTION, SOLUTION INTRAMUSCULAR; INTRAVENOUS at 13:48

## 2022-11-16 RX ADMIN — DIPHENHYDRAMINE HYDROCHLORIDE 25 MG: 50 INJECTION, SOLUTION INTRAMUSCULAR; INTRAVENOUS at 14:19

## 2022-11-16 RX ADMIN — HYDROMORPHONE HYDROCHLORIDE 1 MG: 2 INJECTION, SOLUTION INTRAMUSCULAR; INTRAVENOUS; SUBCUTANEOUS at 14:54

## 2022-11-16 RX ADMIN — Medication 2 MG: at 14:34

## 2022-11-16 RX ADMIN — ONDANSETRON HYDROCHLORIDE 4 MG: 2 INJECTION, SOLUTION INTRAMUSCULAR; INTRAVENOUS at 14:37

## 2022-11-16 RX ADMIN — FENTANYL CITRATE 25 MCG: 0.05 INJECTION, SOLUTION INTRAMUSCULAR; INTRAVENOUS at 15:20

## 2022-11-16 RX ADMIN — LIDOCAINE HYDROCHLORIDE 80 MG: 20 INJECTION, SOLUTION EPIDURAL; INFILTRATION; INTRACAUDAL; PERINEURAL at 13:37

## 2022-11-16 RX ADMIN — HYDROMORPHONE HYDROCHLORIDE 0.2 MG: 1 INJECTION, SOLUTION INTRAMUSCULAR; INTRAVENOUS; SUBCUTANEOUS at 15:52

## 2022-11-16 RX ADMIN — FENTANYL CITRATE 25 MCG: 0.05 INJECTION, SOLUTION INTRAMUSCULAR; INTRAVENOUS at 15:15

## 2022-11-16 RX ADMIN — SODIUM CHLORIDE, POTASSIUM CHLORIDE, SODIUM LACTATE AND CALCIUM CHLORIDE 75 ML/HR: 600; 310; 30; 20 INJECTION, SOLUTION INTRAVENOUS at 12:26

## 2022-11-16 RX ADMIN — ROCURONIUM BROMIDE 30 MG: 10 SOLUTION INTRAVENOUS at 14:00

## 2022-11-16 RX ADMIN — GLYCOPYRROLATE 0.4 MG: 0.2 INJECTION INTRAMUSCULAR; INTRAVENOUS at 14:34

## 2022-11-16 RX ADMIN — MIDAZOLAM 2 MG: 1 INJECTION INTRAMUSCULAR; INTRAVENOUS at 13:33

## 2022-11-16 RX ADMIN — PROPOFOL 100 MG: 10 INJECTION, EMULSION INTRAVENOUS at 13:39

## 2022-11-16 NOTE — ANESTHESIA POSTPROCEDURE EVALUATION
Procedure(s):  ROBOTIC ASSISTED LAPAROSCOPIC LYSIS OF ADHESIONS, TOTAL HYSTERECTOMY, RIGHT SALPINGECTOMY. general    Anesthesia Post Evaluation      Multimodal analgesia: multimodal analgesia used between 6 hours prior to anesthesia start to PACU discharge  Patient location during evaluation: PACU  Patient participation: complete - patient participated  Level of consciousness: awake and alert  Pain management: adequate  Airway patency: patent  Anesthetic complications: no  Cardiovascular status: acceptable  Respiratory status: acceptable  Hydration status: acceptable  Comments: Seen, recovery progressing   Post anesthesia nausea and vomiting:  none  Final Post Anesthesia Temperature Assessment:  Normothermia (36.0-37.5 degrees C)      INITIAL Post-op Vital signs:   Vitals Value Taken Time   /65 11/16/22 1505   Temp 36.4 °C (97.6 °F) 11/16/22 1454   Pulse 77 11/16/22 1509   Resp 12 11/16/22 1509   SpO2 96 % 11/16/22 1509   Vitals shown include unvalidated device data.

## 2022-11-16 NOTE — OP NOTES
[address]   OPERATIVE REPORT    Name:  Jax Ch  MR#:  [id number]  :  1980  ACCOUNT #:  [de-identified]  DATE OF SERVICE:  2022    PREOPERATIVE DIAGNOSES:  Pelvic pain, menorrhagia, recurrent cervical dysplasia. POSTOPERATIVE DIAGNOSES:  Pelvic pain, menorrhagia, recurrent cervical dysplasia. PROCEDURE PERFORMED:  [facility] laparoscopic lysis of adhesions, total hysterectomy with right salpingectomy. SURGEON:  Catherine Fonseca MD    ASSISTANT:  Sendy German MD    ANESTHESIA:  General.    COMPLICATIONS:  None. SPECIMENS REMOVED:   .    IMPLANTS:   .    ESTIMATED BLOOD LOSS:  Minimal.    DRAINS:  Villanueva catheter, clear urine. FINDINGS:  Small-bowel adhesions to the left adnexa. Filshie clips in the pelvis. Hydrosalpinx on the right. Normal right ovary. PROCEDURE:  After extensive counseling risks and benefits of the procedure, complication rates, alternatives and consent being signed, she was taken to the operating room. After adequate anesthesia, she was placed in the dorsal lithotomy position, prepped and draped in usual sterile manner for the surgical procedure. Villanueva catheter was then placed. Clear urine was noted. Sterile Graves speculum was inserted in the vagina. Anterior lip of the cervix was grasped with a single-tooth tenaculum and the cervix was gently dilated to accept a medium VCare uterine manipulator. Single-tooth tenaculum and Graves speculum were removed and attention was turned to the abdomen where a Veress needle was placed infraumbilically and confirmed with a water drop test.  Insufflation of CO2 up to 15 mmHg was performed without complication. #11 scalpel blade was then used to make an infraumbilical incision. 8 mm bladeless trocar and sleeve was inserted infraumbilically with the above-noted findings.   10 cm bilateral to the umbilical port site, 8 mm bladeless trocars and sleeves were inserted as well as one in the right lower quadrant port site. Under direct visualization, the [employer] robot was docked under the proper docking technique. The patient was in steep Trendelenburg. Filshie clip was removed from the cul-de-sac. Adhesiolysis was performed with the fenestrated bipolar and hot cony without complication. A right salpingectomy was performed with the fenestrated bipolar and hot cony without complication. Bilateral tubo-ovarian ligaments were coagulated with the fenestrated bipolar, transected with the hot cony. Bilateral round ligaments were coagulated with the fenestrated bipolar, transected with the hot cony. Bladder flap was created with use of the hot cony. Bilateral uterine vessels were then coagulated. Side walls were visualized for the ureters and from a posterior to anterior approach after a bladder flap was created with the fenestrated bipolar and hot cony, the uterus and cervix were transected from the vagina and delivered through the vagina without complication. The vaginal cuff was then closed with 2-0 Vicryl Stratafix suture in a running nonlocking fashion. Upon complete cuff closure, copious irrigation was performed with no active bleeding noted. Vistaseal was sprayed over the vaginal cuff as well as Surgi powder. The [employer] robot was then undocked under the proper undocking technique. 8 mm sites were closed with 3-0 Vicryl interrupted x1. Dermabond was used on the skin. 0.25% Marcaine without was injected subcutaneous. The patient was awoken in the operating room and taken to recovery room in stable condition.       Isidro Meredith MD      [id number]  D:  11/16/2022 14:46  T:  11/16/2022 15:50  JOB #:  [id number]

## 2022-11-16 NOTE — PROGRESS NOTES
11/16/22 1405   Family Communication   Family Update Message Procedure started   Delivery Origin Nurse    Relationship to Patient Partner  Shashi Olmos)    Phone Number [phone]   Family/Significant Other Update Called

## 2022-11-16 NOTE — PERIOP NOTES
Surgicel Powder given to sterile field:  Reference number- [id number]  Lot number- [id number]  Exp date- 01-    Vistaseal given to sterile field:  Reference number- [id number]  Lot number- [id number]  Exp date- 03-

## 2022-11-16 NOTE — ANESTHESIA PREPROCEDURE EVALUATION
Relevant Problems   No relevant active problems       Anesthetic History   No history of anesthetic complications            Review of Systems / Medical History  Patient summary reviewed, nursing notes reviewed and pertinent labs reviewed    Pulmonary          Smoker (1/2 ppd)         Neuro/Psych         Psychiatric history    Comments: Depression  H/o suicidal ideation Cardiovascular  Within defined limits                Exercise tolerance: >4 METS     GI/Hepatic/Renal  Within defined limits              Endo/Other  Within defined limits           Other Findings   Comments: tubes tied 10/2008           Physical Exam    Airway  Mallampati: II  TM Distance: 4 - 6 cm  Neck ROM: normal range of motion   Mouth opening: Normal     Cardiovascular    Rhythm: regular  Rate: normal         Dental  No notable dental hx       Pulmonary  Breath sounds clear to auscultation               Abdominal  GI exam deferred       Other Findings   Comments: Nose piercing         Anesthetic Plan    ASA: 2  Anesthesia type: general          Induction: Intravenous  Anesthetic plan and risks discussed with: Patient

## 2022-11-16 NOTE — DISCHARGE INSTRUCTIONS
Laparoscopic Hysterectomy: What to Expect at [facility] laparoscopic hysterectomy is surgery to take out the uterus. Your doctor put a lighted tube and surgical tools through small cuts in your belly to remove the uterus. You can expect to feel better and stronger each day. But you might need pain medicine for a week or two. You may get tired easily or have less energy than usual. The tiredness may last for several weeks after surgery. And you also may have light vaginal bleeding for a few weeks. It's important to avoid lifting while you are recovering so that you can heal. It may take about 4 to 6 weeks to fully recover. The recovery time may be shorter for some people. This care sheet gives you a general idea about how long it will take for you to recover. But each person recovers at a different pace. Follow the steps below to get better as quickly as possible. How can you care for yourself at home? Activity    Rest when you feel tired. Getting enough sleep will help you recover. Try to walk each day. Start by walking a little more than you did the day before. Bit by bit, increase the amount you walk. Walking boosts blood flow and helps prevent pneumonia and constipation. Avoid lifting anything that would make you strain. This may include heavy grocery bags and milk containers, a heavy briefcase or backpack, bags of cat litter or dog food, a vacuum , or a child. Avoid strenuous activities, such as biking, jogging, weight lifting, or aerobic exercise, until your doctor says it is okay. Ask your doctor when you can drive again. You may shower 24 to 48 hours after surgery, if your doctor okays it. Pat the incision dry. Do not take a bath for the first 2 weeks, or until your doctor tells you it is okay. Ask your doctor when it is okay for you to have sex. Diet    You can eat your normal diet.  If your stomach is upset, try bland, low-fat foods like plain rice, broiled chicken, toast, and yogurt. If your bowel movements are not regular right after surgery, try to avoid constipation and straining. Drink plenty of water. Your doctor may suggest fiber, a stool softener, or a mild laxative. Medicines    Your doctor will tell you if and when you can restart your medicines. You will also get instructions about taking any new medicines. If you stopped taking aspirin or some other blood thinner, your doctor will tell you when to start taking it again. Be safe with medicines. Read and follow all instructions on the label. If the doctor gave you a prescription medicine for pain, take it as prescribed. If you are not taking a prescription pain medicine, ask your doctor if you can take an over-the-counter medicine. If your doctor prescribed antibiotics, take them as directed. Do not stop taking them just because you feel better. You need to take the full course of antibiotics. Incision care    You may have stitches over the cuts (incisions) the doctor made in your belly. If you have strips of tape on the cut (incision) the doctor made, leave the tape on for a week or until it falls off. Wash the area daily with warm, soapy water, and pat it dry. Don't use hydrogen peroxide or alcohol. They can slow healing. You may cover the area with a gauze bandage if it oozes fluid or rubs against clothing. Change the bandage every day. Other instructions    You may have some light vaginal bleeding. Wear sanitary pads if needed. Do not douche or use tampons. Follow-up care is a key part of your treatment and safety. Be sure to make and go to all appointments, and call your doctor if you are having problems. It's also a good idea to know your test results and keep a list of the medicines you take. When should you call for help? Call 911 anytime you think you may need emergency care. For example, call if:    You passed out (lost consciousness).      You have chest pain, are short of breath, or cough up blood. Call your doctor now or seek immediate medical care if:    You have pain that does not get better after you take pain medicine. You cannot pass stools or gas. You have vaginal discharge that has increased in amount or smells bad. You are sick to your stomach or cannot drink fluids. You have loose stitches, or your incision comes open. Bright red blood has soaked through the bandage over your incision. You have signs of infection, such as: Increased pain, swelling, warmth, or redness. Red streaks leading from the incision. Pus draining from the incision. A fever. You have severe vaginal bleeding. This means that you are soaking through your usual pads every hour for 2 or more hours. You have signs of a blood clot in your leg (called a deep vein thrombosis), such as:  Pain in your calf, back of the knee, thigh, or groin. Redness and swelling in your leg or groin. Watch closely for changes in your health, and be sure to contact your doctor if you have any problems. Where can you learn more? Go to http://www.gray.com/  Enter Q131 in the search box to learn more about \"Laparoscopic Hysterectomy: What to Expect at Home. \"  Current as of: November 22, 2021               Content Version: 13.4  © 2006-2022 [employer]. Care instructions adapted under license by [employer] (which disclaims liability or warranty for this information). If you have questions about a medical condition or this instruction, always ask your healthcare professional. [place] [address] any warranty or liability for your use of this information.     ______________________________________________________________________    Anesthesia Discharge Instructions    After general anesthesia or intervenous sedation, for 24 hours or while taking prescription Narcotics:  Limit your activities  Do not drive or operate hazardous machinery  If you have not urinated within 8 hours after discharge, please contact your surgeon on call. Do not make important personal or business decisions  Do not drink alcoholic beverages    Report the following to your surgeon:  Excessive pain, swelling, redness or odor of or around the surgical area  Temperature over 100.5 degrees  Nausea and vomiting lasting longer than 4 hours or if unable to take medication  Any signs of decreased circulation or nerve impairment to extremity:  Change in color, persistent numbness, tingling, coldness or increased pain.   Any questions

## 2024-02-01 ENCOUNTER — TRANSCRIBE ORDERS (OUTPATIENT)
Facility: HOSPITAL | Age: 44
End: 2024-02-01

## 2024-02-01 DIAGNOSIS — N64.4 MASTODYNIA: Primary | ICD-10-CM

## 2024-02-21 ENCOUNTER — HOSPITAL ENCOUNTER (OUTPATIENT)
Facility: HOSPITAL | Age: 44
Discharge: HOME | End: 2024-02-24
Attending: SPECIALIST
Payer: MEDICAID

## 2024-02-21 DIAGNOSIS — N64.4 MASTODYNIA: ICD-10-CM

## 2024-02-21 PROCEDURE — G0279 TOMOSYNTHESIS, MAMMO: HCPCS

## 2024-02-21 PROCEDURE — 76642 ULTRASOUND BREAST LIMITED: CPT

## 2025-07-31 ENCOUNTER — TRANSCRIBE ORDERS (OUTPATIENT)
Facility: HOSPITAL | Age: 45
End: 2025-07-31

## 2025-07-31 DIAGNOSIS — Z12.31 ENCOUNTER FOR SCREENING MAMMOGRAM FOR MALIGNANT NEOPLASM OF BREAST: Primary | ICD-10-CM

## (undated) DEVICE — SEAL UNIV 5-8MM DISP BX/10 -- DA VINCI XI - SNGL USE

## (undated) DEVICE — OBTRTR BLDELSS 8MM DISP -- DA VINCI - SNGL USE

## (undated) DEVICE — SUTURE VCRL SZ 3-0 L27IN ABSRB UD L26MM SH 1/2 CIR J416H

## (undated) DEVICE — REM POLYHESIVE ADULT PATIENT RETURN ELECTRODE: Brand: VALLEYLAB

## (undated) DEVICE — SOLUTION IRRIG 3000ML 0.9% SOD CHL FLX CONT 0797208] ICU MEDICAL INC]

## (undated) DEVICE — AIRSEAL 8 MM ACCESS PORT AND LOW PROFILE OBTURATOR WITH BLADELESS OPTICAL TIP, 120 MM LENGTH: Brand: AIRSEAL

## (undated) DEVICE — KENDALL SCD EXPRESS SLEEVES, KNEE LENGTH, MEDIUM: Brand: KENDALL SCD

## (undated) DEVICE — SUTURE VCRL SZ 2-0 L27IN ABSRB UD L26MM SH 1/2 CIR J417H

## (undated) DEVICE — COVER LT HNDL PLAS RIG 1 PER PK

## (undated) DEVICE — ELECTRO LUBE IS A SINGLE PATIENT USE DEVICE THAT IS INTENDED TO BE USED ON ELECTROSURGICAL ELECTRODES TO REDUCE STICKING.: Brand: KEY SURGICAL ELECTRO LUBE

## (undated) DEVICE — DRAPE,REIN 53X77,STERILE: Brand: MEDLINE

## (undated) DEVICE — FENESTRATED BIPOLAR FORCEPS: Brand: ENDOWRIST

## (undated) DEVICE — Device

## (undated) DEVICE — AGENT HEMSTAT 5GM ARISTA AH

## (undated) DEVICE — SUTURE VCRL SZ 4-0 L27IN ABSRB UD L19MM PS-2 3/8 CIR PRIM J426H

## (undated) DEVICE — 3M™ TEGADERM™ TRANSPARENT FILM DRESSING FRAME STYLE, 1624W, 2-3/8 IN X 2-3/4 IN (6 CM X 7 CM), 100/CT 4CT/CASE: Brand: 3M™ TEGADERM™

## (undated) DEVICE — NEEDLE HYPO 21GA L1.5IN INTRAMUSCULAR S STL LATCH BVL UP

## (undated) DEVICE — STERILE LATEX POWDER-FREE SURGICAL GLOVESWITH NITRILE COATING: Brand: PROTEXIS

## (undated) DEVICE — STERILE POLYISOPRENE POWDER-FREE SURGICAL GLOVES: Brand: PROTEXIS

## (undated) DEVICE — SYR 10ML LUER LOK 1/5ML GRAD --

## (undated) DEVICE — POWDER HEMOSTAT GEL 3.0GR -- SURGICEL

## (undated) DEVICE — COLUMN DRAPE

## (undated) DEVICE — (D)PREP SKN CHLRAPRP APPL 26ML -- CONVERT TO ITEM 371833

## (undated) DEVICE — TUBING INSUF HI FLO HEAT STRL --

## (undated) DEVICE — PAD BD MATTRESS 73X32 IN STD CONVOLUTED FOAM LTX FREE

## (undated) DEVICE — CHEST/BREAST-LF: Brand: MEDLINE INDUSTRIES, INC.

## (undated) DEVICE — SWAB CULT LIQ STUART AGR AERB MOD IN BRK SGL RAYON TIP PLAS 220099] BECTON DICKINSON MICRO]

## (undated) DEVICE — INSUFFLATION NEEDLE TO ESTABLISH PNEUMOPERITONEUM.: Brand: INSUFFLATION NEEDLE

## (undated) DEVICE — SOLUTION LACTATED RINGERS INJECTION USP

## (undated) DEVICE — TOWEL SURG W17XL27IN STD BLU COT NONFENESTRATED PREWASHED

## (undated) DEVICE — COVER,TABLE,60X90,STERILE: Brand: MEDLINE

## (undated) DEVICE — SOLUTION IV 1000ML 0.9% SOD CHL

## (undated) DEVICE — SOLUTION IRRIG 1000ML 0.9% SOD CHL USP POUR PLAS BTL

## (undated) DEVICE — CULTURETTE SGL EVAC TUBE PALL -- 100/CA

## (undated) DEVICE — SUTURE MCRYL SZ 4-0 L27IN ABSRB UD L19MM PS-2 1/2 CIR PRIM Y426H

## (undated) DEVICE — COVER MPLR TIP CRV SCIS ACC DA VINCI

## (undated) DEVICE — HEX-LOCKING BLADE ELECTRODE: Brand: EDGE

## (undated) DEVICE — PREP PAD BNS: Brand: CONVERTORS

## (undated) DEVICE — SYRINGE MED 20ML STD CLR PLAS LUERLOCK TIP N CTRL DISP

## (undated) DEVICE — VCARE MEDIUM, UTERINE MANIPULATOR, VAGINAL-CERVICAL-AHLUWALIA'S-RETRACTOR-ELEVATOR: Brand: VCARE

## (undated) DEVICE — SURGICEL ENDOSCP APPL

## (undated) DEVICE — SUTURE STRATAFIX SPRL PDS + SZ 2-0 L6IN ABSRB VLT L36MM SXPP1B409

## (undated) DEVICE — PREP SKN CHLRAPRP APL 26ML STR --

## (undated) DEVICE — 1200 GUARD II KIT W/5MM TUBE W/O VAC TUBE: Brand: GUARDIAN

## (undated) DEVICE — HANDLE LT SNAP ON ULT DURABLE LENS FOR TRUMPF ALC DISPOSABLE

## (undated) DEVICE — PAD PT POS 36 IN SURGYPAD DISP

## (undated) DEVICE — INFECTION CONTROL KIT SYS

## (undated) DEVICE — TAPE,CLOTH/SILK,CURAD,3"X10YD,LF,40/CS: Brand: CURAD

## (undated) DEVICE — APPLICATOR BNDG 1MM ADH PREMIERPRO EXOFIN

## (undated) DEVICE — GOWN,SIRUS,FABRNF,XL,20/CS: Brand: MEDLINE

## (undated) DEVICE — CONTINU-FLO SOLUTION SET, 2 INJECTION SITES, MALE LUER LOCK ADAPTER WITH RETRACTABLE COLLAR, LARGE BORE STOPCOCK WITH ROTATING MALE LUER LOCK EXTENSION SET, 2 INJECTION SITES, MALE LUER LOCK ADAPTER WITH RETRACTABLE COLLAR: Brand: INTERLINK/CONTINU-FLO

## (undated) DEVICE — GYN LAPAROSCOPY - SMH: Brand: MEDLINE INDUSTRIES, INC.

## (undated) DEVICE — KENDALL DL ECG CABLE AND LEAD WIRE SYSTEM, 3-LEAD, SINGLE PATIENT USE: Brand: KENDALL

## (undated) DEVICE — CONTAINER,SPECIMEN,OR STERILE,4OZ: Brand: MEDLINE

## (undated) DEVICE — SPONGE GZ W4XL4IN COT 12 PLY TYP VII WVN C FLD DSGN

## (undated) DEVICE — TRI-LUMEN FILTERED TUBE SET WITH ACTIVATED CHARCOAL FILTER: Brand: AIRSEAL

## (undated) DEVICE — SCISSORS SURG DIA8MM MPLR CRV ENDOWRIST

## (undated) DEVICE — CLEARVIEW UTERINE MANIPULATOR, 7CM: Brand: CLEARVIEW UTERINE MANIPULATOR

## (undated) DEVICE — ROCKER SWITCH PENCIL BLADE ELECTRODE, HOLSTER: Brand: EDGE

## (undated) DEVICE — SEALANT TISS 10 CC FIBRIN VISTASEAL

## (undated) DEVICE — DRAPE SURG EQUIP W105XH13XL20IN 3 ARM DISPOSABLE DA VINCI S

## (undated) DEVICE — LEGGINGS: Brand: CONVERTORS

## (undated) DEVICE — GLOVE ORANGE PI 7 1/2   MSG9075

## (undated) DEVICE — SPECIMEN RETRIEVAL POUCH: Brand: ENDO CATCH GOLD

## (undated) DEVICE — TRAY CATH 16F URIN MTR LTX -- CONVERT TO ITEM 363111

## (undated) DEVICE — APPLICATOR LAP 45CM FLX 2 VISTASEAL

## (undated) DEVICE — KIT,1200CC CANISTER,3/16"X6' TUBING: Brand: MEDLINE INDUSTRIES, INC.

## (undated) DEVICE — SURGICAL PROCEDURE PACK GYN LAPAROSCOPY CUST SMH LF

## (undated) DEVICE — ARM DRAPE

## (undated) DEVICE — NEEDLE HYPO 25GA L1.5IN BVL ORIENTED ECLIPSE

## (undated) DEVICE — STRAP POS KNEE BODY VELC

## (undated) DEVICE — SYSTEM EVAC SMOKE LAPARSCOPIC

## (undated) DEVICE — TRAY PREP DRY W/ PREM GLV 2 APPL 6 SPNG 2 UNDPD 1 OVERWRAP

## (undated) DEVICE — TROCAR SITE CLOSURE DEVICE: Brand: ENDO CLOSE

## (undated) DEVICE — PAD,SANITARY,11 IN,MAXI,N-STRL,IND WRAP: Brand: MEDLINE

## (undated) DEVICE — SOLUTION IRRIG 1000ML H2O STRL BLT

## (undated) DEVICE — TIP COVER ACCESSORY

## (undated) DEVICE — SUT MCRYL 4-0 27IN PS2 UD --